# Patient Record
Sex: FEMALE | Race: ASIAN | NOT HISPANIC OR LATINO | Employment: UNEMPLOYED | URBAN - METROPOLITAN AREA
[De-identification: names, ages, dates, MRNs, and addresses within clinical notes are randomized per-mention and may not be internally consistent; named-entity substitution may affect disease eponyms.]

---

## 2017-01-10 ENCOUNTER — AMBULATORY - HEALTHEAST (OUTPATIENT)
Dept: EDUCATION SERVICES | Facility: CLINIC | Age: 52
End: 2017-01-10

## 2017-01-17 ENCOUNTER — COMMUNICATION - HEALTHEAST (OUTPATIENT)
Dept: FAMILY MEDICINE | Facility: CLINIC | Age: 52
End: 2017-01-17

## 2017-03-14 ENCOUNTER — AMBULATORY - HEALTHEAST (OUTPATIENT)
Dept: EDUCATION SERVICES | Facility: CLINIC | Age: 52
End: 2017-03-14

## 2017-03-14 DIAGNOSIS — E08.65 DIABETES MELLITUS DUE TO UNDERLYING CONDITION WITH HYPERGLYCEMIA (H): ICD-10-CM

## 2017-03-14 LAB — HBA1C MFR BLD: 5.9 % (ref 3.5–6)

## 2017-03-24 ENCOUNTER — OFFICE VISIT - HEALTHEAST (OUTPATIENT)
Dept: FAMILY MEDICINE | Facility: CLINIC | Age: 52
End: 2017-03-24

## 2017-03-24 ENCOUNTER — COMMUNICATION - HEALTHEAST (OUTPATIENT)
Dept: TELEHEALTH | Facility: CLINIC | Age: 52
End: 2017-03-24

## 2017-03-24 DIAGNOSIS — Z23 NEED FOR IMMUNIZATION AGAINST INFLUENZA: ICD-10-CM

## 2017-03-24 DIAGNOSIS — R12 HEARTBURN: ICD-10-CM

## 2017-03-24 DIAGNOSIS — E78.5 HYPERLIPIDEMIA: ICD-10-CM

## 2017-03-24 DIAGNOSIS — Z12.31 ENCOUNTER FOR SCREENING MAMMOGRAM FOR BREAST CANCER: ICD-10-CM

## 2017-03-24 DIAGNOSIS — E55.9 VITAMIN D DEFICIENCY: ICD-10-CM

## 2017-03-24 DIAGNOSIS — E11.9 DIET-CONTROLLED DIABETES MELLITUS (H): ICD-10-CM

## 2017-03-24 DIAGNOSIS — Z12.11 ENCOUNTER FOR SCREENING COLONOSCOPY: ICD-10-CM

## 2017-03-24 DIAGNOSIS — R05.9 COUGH: ICD-10-CM

## 2017-03-24 LAB
CHOLEST SERPL-MCNC: 224 MG/DL
FASTING STATUS PATIENT QL REPORTED: ABNORMAL
HDLC SERPL-MCNC: 55 MG/DL
LDLC SERPL CALC-MCNC: 145 MG/DL
TRIGL SERPL-MCNC: 121 MG/DL

## 2017-03-24 ASSESSMENT — MIFFLIN-ST. JEOR: SCORE: 1240.1

## 2017-03-30 ENCOUNTER — COMMUNICATION - HEALTHEAST (OUTPATIENT)
Dept: FAMILY MEDICINE | Facility: CLINIC | Age: 52
End: 2017-03-30

## 2017-04-04 ENCOUNTER — RECORDS - HEALTHEAST (OUTPATIENT)
Dept: ADMINISTRATIVE | Facility: OTHER | Age: 52
End: 2017-04-04

## 2017-04-04 ENCOUNTER — AMBULATORY - HEALTHEAST (OUTPATIENT)
Dept: LAB | Facility: CLINIC | Age: 52
End: 2017-04-04

## 2017-04-04 DIAGNOSIS — Z12.11 ENCOUNTER FOR SCREENING COLONOSCOPY: ICD-10-CM

## 2017-06-06 ENCOUNTER — RECORDS - HEALTHEAST (OUTPATIENT)
Dept: MAMMOGRAPHY | Facility: CLINIC | Age: 52
End: 2017-06-06

## 2017-06-06 ENCOUNTER — OFFICE VISIT - HEALTHEAST (OUTPATIENT)
Dept: FAMILY MEDICINE | Facility: CLINIC | Age: 52
End: 2017-06-06

## 2017-06-06 DIAGNOSIS — J30.2 SEASONAL ALLERGIES: ICD-10-CM

## 2017-06-06 DIAGNOSIS — H10.10 ALLERGIC CONJUNCTIVITIS: ICD-10-CM

## 2017-06-06 DIAGNOSIS — M65.30 TRIGGER FINGER: ICD-10-CM

## 2017-06-06 DIAGNOSIS — Z12.31 ENCOUNTER FOR SCREENING MAMMOGRAM FOR MALIGNANT NEOPLASM OF BREAST: ICD-10-CM

## 2017-06-06 DIAGNOSIS — J30.9 ALLERGIC RHINITIS: ICD-10-CM

## 2017-08-27 ENCOUNTER — HEALTH MAINTENANCE LETTER (OUTPATIENT)
Age: 52
End: 2017-08-27

## 2017-11-28 ENCOUNTER — RECORDS - HEALTHEAST (OUTPATIENT)
Dept: GENERAL RADIOLOGY | Facility: CLINIC | Age: 52
End: 2017-11-28

## 2017-11-28 ENCOUNTER — OFFICE VISIT - HEALTHEAST (OUTPATIENT)
Dept: FAMILY MEDICINE | Facility: CLINIC | Age: 52
End: 2017-11-28

## 2017-11-28 DIAGNOSIS — M25.561 PAIN IN RIGHT KNEE: ICD-10-CM

## 2017-11-28 DIAGNOSIS — Z23 NEED FOR INFLUENZA VACCINATION: ICD-10-CM

## 2017-11-28 DIAGNOSIS — M25.561 ACUTE BILATERAL KNEE PAIN: ICD-10-CM

## 2017-11-28 DIAGNOSIS — M25.562 PAIN IN LEFT KNEE: ICD-10-CM

## 2017-11-28 DIAGNOSIS — M25.562 ACUTE BILATERAL KNEE PAIN: ICD-10-CM

## 2017-11-29 ENCOUNTER — COMMUNICATION - HEALTHEAST (OUTPATIENT)
Dept: FAMILY MEDICINE | Facility: CLINIC | Age: 52
End: 2017-11-29

## 2018-04-11 ENCOUNTER — COMMUNICATION - HEALTHEAST (OUTPATIENT)
Dept: FAMILY MEDICINE | Facility: CLINIC | Age: 53
End: 2018-04-11

## 2018-04-11 DIAGNOSIS — E11.9 DIET-CONTROLLED DIABETES MELLITUS (H): ICD-10-CM

## 2018-04-11 DIAGNOSIS — E78.5 HYPERLIPIDEMIA: ICD-10-CM

## 2018-04-27 ENCOUNTER — OFFICE VISIT - HEALTHEAST (OUTPATIENT)
Dept: FAMILY MEDICINE | Facility: CLINIC | Age: 53
End: 2018-04-27

## 2018-04-27 DIAGNOSIS — E78.5 HYPERLIPIDEMIA: ICD-10-CM

## 2018-04-27 DIAGNOSIS — M17.9 KNEE OSTEOARTHRITIS: ICD-10-CM

## 2018-04-27 DIAGNOSIS — Z12.31 ENCOUNTER FOR SCREENING MAMMOGRAM FOR BREAST CANCER: ICD-10-CM

## 2018-04-27 DIAGNOSIS — E66.9 OBESITY: ICD-10-CM

## 2018-04-27 DIAGNOSIS — M25.562 ACUTE BILATERAL KNEE PAIN: ICD-10-CM

## 2018-04-27 DIAGNOSIS — E11.9 DIET-CONTROLLED DIABETES MELLITUS (H): ICD-10-CM

## 2018-04-27 DIAGNOSIS — M25.561 ACUTE BILATERAL KNEE PAIN: ICD-10-CM

## 2018-04-27 ASSESSMENT — MIFFLIN-ST. JEOR: SCORE: 1322.87

## 2018-10-19 ENCOUNTER — OFFICE VISIT - HEALTHEAST (OUTPATIENT)
Dept: FAMILY MEDICINE | Facility: CLINIC | Age: 53
End: 2018-10-19

## 2018-10-19 ENCOUNTER — HOSPITAL ENCOUNTER (OUTPATIENT)
Dept: LAB | Age: 53
Setting detail: SPECIMEN
Discharge: HOME | End: 2018-10-19

## 2018-10-19 DIAGNOSIS — R05.9 COUGHING: ICD-10-CM

## 2018-10-19 DIAGNOSIS — R07.0 THROAT PAIN: ICD-10-CM

## 2018-10-19 DIAGNOSIS — R06.02 SOB (SHORTNESS OF BREATH): ICD-10-CM

## 2018-10-19 DIAGNOSIS — J18.9 RLL PNEUMONIA: ICD-10-CM

## 2018-10-19 LAB — DEPRECATED S PYO AG THROAT QL EIA: NORMAL

## 2018-10-20 LAB — GROUP A STREP BY PCR: NORMAL

## 2018-12-19 ENCOUNTER — OFFICE VISIT - HEALTHEAST (OUTPATIENT)
Dept: FAMILY MEDICINE | Facility: CLINIC | Age: 53
End: 2018-12-19

## 2018-12-19 DIAGNOSIS — J20.9 ACUTE BRONCHITIS, UNSPECIFIED ORGANISM: ICD-10-CM

## 2018-12-19 DIAGNOSIS — E78.00 PURE HYPERCHOLESTEROLEMIA: ICD-10-CM

## 2018-12-19 DIAGNOSIS — Z12.31 ENCOUNTER FOR SCREENING MAMMOGRAM FOR BREAST CANCER: ICD-10-CM

## 2018-12-19 DIAGNOSIS — R06.2 WHEEZING: ICD-10-CM

## 2018-12-19 DIAGNOSIS — E11.9 DIET-CONTROLLED DIABETES MELLITUS (H): ICD-10-CM

## 2018-12-19 LAB
ALBUMIN SERPL-MCNC: 3.7 G/DL (ref 3.5–5)
ALP SERPL-CCNC: 87 U/L (ref 45–120)
ALT SERPL W P-5'-P-CCNC: 28 U/L (ref 0–45)
ANION GAP SERPL CALCULATED.3IONS-SCNC: 11 MMOL/L (ref 5–18)
AST SERPL W P-5'-P-CCNC: 25 U/L (ref 0–40)
BILIRUB SERPL-MCNC: 0.6 MG/DL (ref 0–1)
BUN SERPL-MCNC: 16 MG/DL (ref 8–22)
CALCIUM SERPL-MCNC: 9.1 MG/DL (ref 8.5–10.5)
CHLORIDE BLD-SCNC: 107 MMOL/L (ref 98–107)
CHOLEST SERPL-MCNC: 174 MG/DL
CO2 SERPL-SCNC: 25 MMOL/L (ref 22–31)
CREAT SERPL-MCNC: 0.72 MG/DL (ref 0.6–1.1)
CREAT UR-MCNC: 124.6 MG/DL
FASTING STATUS PATIENT QL REPORTED: YES
GFR SERPL CREATININE-BSD FRML MDRD: >60 ML/MIN/1.73M2
GLUCOSE BLD-MCNC: 104 MG/DL (ref 70–125)
HBA1C MFR BLD: 6 % (ref 3.5–6)
HDLC SERPL-MCNC: 45 MG/DL
LDLC SERPL CALC-MCNC: 111 MG/DL
MICROALBUMIN UR-MCNC: 0.79 MG/DL (ref 0–1.99)
MICROALBUMIN/CREAT UR: 6.3 MG/G
POTASSIUM BLD-SCNC: 4.3 MMOL/L (ref 3.5–5)
PROT SERPL-MCNC: 6.8 G/DL (ref 6–8)
SODIUM SERPL-SCNC: 143 MMOL/L (ref 136–145)
TRIGL SERPL-MCNC: 92 MG/DL

## 2018-12-19 ASSESSMENT — MIFFLIN-ST. JEOR: SCORE: 1290.56

## 2019-04-26 ENCOUNTER — AMBULATORY - HEALTHEAST (OUTPATIENT)
Dept: FAMILY MEDICINE | Facility: CLINIC | Age: 54
End: 2019-04-26

## 2019-04-26 ENCOUNTER — OFFICE VISIT - HEALTHEAST (OUTPATIENT)
Dept: FAMILY MEDICINE | Facility: CLINIC | Age: 54
End: 2019-04-26

## 2019-04-26 DIAGNOSIS — M17.9 OSTEOARTHRITIS OF KNEE, UNSPECIFIED LATERALITY, UNSPECIFIED OSTEOARTHRITIS TYPE: ICD-10-CM

## 2019-04-26 DIAGNOSIS — H10.33 ACUTE CONJUNCTIVITIS OF BOTH EYES, UNSPECIFIED ACUTE CONJUNCTIVITIS TYPE: ICD-10-CM

## 2019-04-26 DIAGNOSIS — M17.11 OSTEOARTHRITIS OF RIGHT KNEE, UNSPECIFIED OSTEOARTHRITIS TYPE: ICD-10-CM

## 2019-04-26 DIAGNOSIS — M25.562 BILATERAL ANTERIOR KNEE PAIN: ICD-10-CM

## 2019-04-26 DIAGNOSIS — E11.9 DIET-CONTROLLED DIABETES MELLITUS (H): ICD-10-CM

## 2019-04-26 DIAGNOSIS — M25.561 BILATERAL ANTERIOR KNEE PAIN: ICD-10-CM

## 2019-05-06 ENCOUNTER — OFFICE VISIT - HEALTHEAST (OUTPATIENT)
Dept: PHYSICAL THERAPY | Facility: REHABILITATION | Age: 54
End: 2019-05-06

## 2019-05-06 DIAGNOSIS — M62.81 MUSCLE WEAKNESS (GENERALIZED): ICD-10-CM

## 2019-05-06 DIAGNOSIS — M25.561 CHRONIC PAIN OF BOTH KNEES: ICD-10-CM

## 2019-05-06 DIAGNOSIS — M25.562 CHRONIC PAIN OF BOTH KNEES: ICD-10-CM

## 2019-05-06 DIAGNOSIS — G89.29 CHRONIC PAIN OF BOTH KNEES: ICD-10-CM

## 2019-05-10 ENCOUNTER — RECORDS - HEALTHEAST (OUTPATIENT)
Dept: ADMINISTRATIVE | Facility: OTHER | Age: 54
End: 2019-05-10

## 2019-05-20 ENCOUNTER — RECORDS - HEALTHEAST (OUTPATIENT)
Dept: ADMINISTRATIVE | Facility: OTHER | Age: 54
End: 2019-05-20

## 2019-05-21 ENCOUNTER — COMMUNICATION - HEALTHEAST (OUTPATIENT)
Dept: FAMILY MEDICINE | Facility: CLINIC | Age: 54
End: 2019-05-21

## 2020-03-05 ENCOUNTER — OFFICE VISIT - HEALTHEAST (OUTPATIENT)
Dept: FAMILY MEDICINE | Facility: CLINIC | Age: 55
End: 2020-03-05

## 2020-03-05 DIAGNOSIS — Z11.4 ENCOUNTER FOR SCREENING FOR HUMAN IMMUNODEFICIENCY VIRUS (HIV): ICD-10-CM

## 2020-03-05 DIAGNOSIS — E66.01 MORBID OBESITY (H): ICD-10-CM

## 2020-03-05 DIAGNOSIS — E78.00 PURE HYPERCHOLESTEROLEMIA: ICD-10-CM

## 2020-03-05 DIAGNOSIS — E11.9 DIET-CONTROLLED DIABETES MELLITUS (H): ICD-10-CM

## 2020-03-05 LAB
ALBUMIN SERPL-MCNC: 4.2 G/DL (ref 3.5–5)
ALP SERPL-CCNC: 114 U/L (ref 45–120)
ALT SERPL W P-5'-P-CCNC: 36 U/L (ref 0–45)
ANION GAP SERPL CALCULATED.3IONS-SCNC: 13 MMOL/L (ref 5–18)
AST SERPL W P-5'-P-CCNC: 25 U/L (ref 0–40)
BILIRUB SERPL-MCNC: 0.6 MG/DL (ref 0–1)
BUN SERPL-MCNC: 19 MG/DL (ref 8–22)
CALCIUM SERPL-MCNC: 9.4 MG/DL (ref 8.5–10.5)
CHLORIDE BLD-SCNC: 110 MMOL/L (ref 98–107)
CO2 SERPL-SCNC: 23 MMOL/L (ref 22–31)
CREAT SERPL-MCNC: 0.8 MG/DL (ref 0.6–1.1)
CREAT UR-MCNC: 114.8 MG/DL
GFR SERPL CREATININE-BSD FRML MDRD: >60 ML/MIN/1.73M2
GLUCOSE BLD-MCNC: 91 MG/DL (ref 70–125)
HBA1C MFR BLD: 7.1 % (ref 3.5–6)
HIV 1+2 AB+HIV1 P24 AG SERPL QL IA: NEGATIVE
LDLC SERPL CALC-MCNC: 125 MG/DL
MICROALBUMIN UR-MCNC: 1.69 MG/DL (ref 0–1.99)
MICROALBUMIN/CREAT UR: 14.7 MG/G
POTASSIUM BLD-SCNC: 4.2 MMOL/L (ref 3.5–5)
PROT SERPL-MCNC: 7.3 G/DL (ref 6–8)
SODIUM SERPL-SCNC: 146 MMOL/L (ref 136–145)

## 2020-03-05 ASSESSMENT — MIFFLIN-ST. JEOR: SCORE: 1335.45

## 2020-03-06 LAB — HCV AB SERPL QL IA: NEGATIVE

## 2020-03-10 ENCOUNTER — AMBULATORY - HEALTHEAST (OUTPATIENT)
Dept: FAMILY MEDICINE | Facility: CLINIC | Age: 55
End: 2020-03-10

## 2020-03-10 ENCOUNTER — COMMUNICATION - HEALTHEAST (OUTPATIENT)
Dept: FAMILY MEDICINE | Facility: CLINIC | Age: 55
End: 2020-03-10

## 2020-04-30 ENCOUNTER — COMMUNICATION - HEALTHEAST (OUTPATIENT)
Dept: FAMILY MEDICINE | Facility: CLINIC | Age: 55
End: 2020-04-30

## 2020-04-30 DIAGNOSIS — M17.9 KNEE OSTEOARTHRITIS: ICD-10-CM

## 2020-04-30 DIAGNOSIS — R05.9 COUGH: ICD-10-CM

## 2020-04-30 DIAGNOSIS — E08.65 DIABETES MELLITUS DUE TO UNDERLYING CONDITION WITH HYPERGLYCEMIA (H): ICD-10-CM

## 2020-04-30 DIAGNOSIS — M25.561 ACUTE BILATERAL KNEE PAIN: ICD-10-CM

## 2020-04-30 DIAGNOSIS — E11.9 DIET-CONTROLLED DIABETES MELLITUS (H): ICD-10-CM

## 2020-04-30 DIAGNOSIS — M25.562 ACUTE BILATERAL KNEE PAIN: ICD-10-CM

## 2020-05-13 ENCOUNTER — VIRTUAL VISIT (OUTPATIENT)
Dept: FAMILY MEDICINE | Facility: OTHER | Age: 55
End: 2020-05-13

## 2020-05-13 NOTE — PROGRESS NOTES
"Date: 2020 15:28:43  Clinician: Clint Dubon  Clinician NPI: [id number]  Patient: Ruiz Delacruz  Patient : 1965  Patient Address: [address] [place] [address]  Patient Phone: [phone]  Visit Protocol: URI  Patient Summary:  Ruiz is a 54 year old ( : 1965 ) female who initiated a Visit for COVID-19 (Coronavirus) evaluation and screening. When asked the question \"Please sign me up to receive news, health information and promotions from [employer].\", Ruiz responded \"No\".    Ruiz states her symptoms started gradually 5-6 days ago. After her symptoms started, they improved and then got worse again.   Her symptoms consist of a cough, ageusia, malaise, anosmia, a sore throat, and wheezing.   Symptom details     Cough: Ruiz coughs every 5-10 minutes and her cough is more bothersome at night. Phlegm comes into her throat when she coughs. She does not believe her cough is caused by post-nasal drip. The color of the phlegm is yellow.     Sore throat: Ruiz reports having moderate throat pain (4-6 on a 10 point pain scale), does not have exudate on her tonsils, and can swallow liquids. The lymph nodes in her neck are not enlarged. A rash has not appeared on the skin since the sore throat started.     Wheezing: Ruiz has been diagnosed with asthma. The wheezing interferes with her normal daily activities.     Ruiz denies having nasal congestion, vomiting, nausea, teeth pain, diarrhea, facial pain or pressure, chills, ear pain, headache, myalgias, rhinitis, enlarged lymph nodes, and fever. She also denies having recent facial or sinus surgery in the past 60 days. She is not experiencing dyspnea.   Precipitating events  Within the past week, Ruiz has not been exposed to someone with strep throat. She has recently been exposed to someone with influenza. Ruiz has been in close contact with the following high risk individuals: children under the age of 5.   Pertinent COVID-19 (Coronavirus) information  In the past 14 " days, Ruiz has not worked in a congregate living setting.   She does not work or volunteer as healthcare worker or a  and does not work or volunteer in a healthcare facility.   Ruiz also has not lived in a congregate living setting in the past 14 days. She does not live with a healthcare worker.   Ruiz has had a close contact with a laboratory-confirmed COVID-19 patient within 14 days of symptom onset. Additional information about contact with COVID-19 (Coronavirus) patient as reported by the patient (free text): I was around my grand kids prior to getting sick and around my daughter in law who was tested positive with Covid-19 on 5/9. Before this we didn't know she had the virus.   Triage Point(s) temporarily suspended for COVID-19 (Coronavirus) screening  Ruiz reported the following symptoms which were previously protocol referral points. These protocol referral points have temporarily been removed for purposes of COVID-19 (Coronavirus) screening.   Wheezing that keeps Ruiz from doing daily activities   Pertinent medical history  Ruiz has taken an antibiotic medication in the past month. Antibiotic details as reported by the patient (free text): Tylenol, cough syrup   Ruiz does not get yeast infections when she takes antibiotics.   Ruiz does not need a return to work/school note.   Weight: 190 lbs   Ruiz does not smoke or use smokeless tobacco.   Weight: 190 lbs    MEDICATIONS: Cough-Sore Throat Night oral, Tylenol Extra Strength oral, ALLERGIES: NKDA  Clinician Response:  Dear Uriz,   Dear Ruiz  Your symptoms show that you may have coronavirus (COVID-19). This illness can cause fever, cough and trouble breathing. Many people get a mild case and get better on their own. Some people can get very sick.  What should I do?  We would like to test you for this virus. This will be a curbside test done outside the clinic.  Please call [phone] to schedule your visit. Explain that you were referred by [facility] to  "have a COVID-19 test. Be ready to share your [facility] visit ID number.  Starting now:  Stay at least 6 feet away from others. (If someone will drive you to your test, stay in the backseat, as far away from the  as you can.)   Don't go to work, school or anywhere else. When it's time for your test, go straight to the testing site. Don't make any stops on the way there or back.   Wash your hands and face often. Use soap and water.   Cover your mouth and nose with a mask, tissue or washcloth.   Don't touch anyone. No hugging, kissing or handshakes.  While at home   Stay home and away from others (self-isolate) until:  You've had no fever---and no medicine that reduces fever---for 3 full days (72 hours). And...  Your other symptoms have gotten better. For example, your cough or breathing has improved. And...  At least 10 days have passed since your symptoms started.  During this time:  Stay in your own room (and use your own bathroom), if you can.  Don't go to work, school or anywhere else.  Stay away from others in your home. No hugging, kissing or shaking hands.  Don't let anyone visit.  Cover your mouth and nose with a mask, tissue or washcloth to avoid spreading germs.  Clean \"high touch\" surfaces often (doorknobs, counters, handles, etc.). Use a household cleaning spray or wipes.  Wash your hands and face often. Use soap and water.  How can I take care of myself?  1. Get lots of rest. Drink extra fluids (unless your doctor has told you not to).  2. Take Tylenol (acetaminophen) for fever or pain. If you have liver or kidney problems, ask your family doctor if it's okay to take Tylenol.  Adults can take either:   650 mg (two 325 mg pills) every 4 to 6 hours, or...  1,000 mg (two 500 mg pills) every 8 hours as needed.   Note: Don't take more than 3,000 mg in one day.   Acetaminophen is found in many medicines (both prescribed and over-the-counter medicines). Read all labels to be sure you don't take too much.   " For children, check the Tylenol bottle for the right dose. The dose is based on the child's age or weight.  3. If you have other health problems (like cancer, heart failure, an organ transplant or severe kidney disease): Call your specialty clinic if you don't feel better in the next 2 days.  4. Know when to call 911: If your breathing is so bad that it keeps you from doing normal activities, call 911 or go to the emergency room. Tell them that you've been staying home and may have COVID-19.  5. Sign up for [employer]. We know it's scary to hear that you might have COVID-19. We want to track your symptoms to make sure you're okay over the next 2 weeks. Please look for an email from [employer]---this is a free, online program that we'll use to keep in touch. To sign up, follow the link in the email. Learn more at [email or web address].  6. The following will serve as your written order for this Covid Test ordered by me for the indication of suspected Covid [Z20.828]: The test will be ordered in [facility], our electronic health record after you are scheduled and will show as ordered and authorized by Triston Regalado MD   Order: Covid-19 (Coronavirus) PCR for SYMPTOMATIC testing from [facility]  Where can I get more information?  To learn more about COVID-19 and how to care for yourself at home, please visit the CDC website at https://www.cdc.gov/coronavirus/2019-ncov/about/steps-when-sick.html.  For more about your care at [facility], please visit [email or web address]  If you'd like to be part of a COVID-19 clinical trial (research study) at the [facility], go to [email or web address] for details.    COVID-19 (Coronavirus) General Information  Because there is currently no vaccine to prevent infection, the best way to protect yourself is to avoid being exposed to this virus. Common symptoms of COVID-19 include but are not limited to fever, cough,  and shortness of breath. These symptoms appear 2-14 days after you are exposed to the virus that causes COVID-19. Click here for more information from the CDC on how to protect yourself.  If you are sick with COVID-19 or suspect you are infected with the virus that causes COVID-19, follow the steps here from the CDC to help prevent the disease from spreading to people in your home and community.  Click here for general information from the CDC on testing.  If you develop any of these emergency warning signs for COVID-19, get medical attention immediately:     Trouble breathing    Persistent pain or pressure in the chest    New confusion or inability to arouse    Bluish lips or face      Call your doctor or clinic before going in. Call 911 if you have a medical emergency and notify the  you have or think you may have COVID-19.  For more detailed and up to date information on COVID-19 (Coronavirus), please visit the CDC website.   Diagnosis: Cough  Diagnosis ICD: R05

## 2020-05-14 ENCOUNTER — AMBULATORY - HEALTHEAST (OUTPATIENT)
Dept: FAMILY MEDICINE | Facility: CLINIC | Age: 55
End: 2020-05-14

## 2020-05-14 ENCOUNTER — OFFICE VISIT - HEALTHEAST (OUTPATIENT)
Dept: FAMILY MEDICINE | Facility: CLINIC | Age: 55
End: 2020-05-14

## 2020-05-14 DIAGNOSIS — Z20.822 SUSPECTED COVID-19 VIRUS INFECTION: ICD-10-CM

## 2020-05-16 ENCOUNTER — COMMUNICATION - HEALTHEAST (OUTPATIENT)
Dept: FAMILY MEDICINE | Facility: CLINIC | Age: 55
End: 2020-05-16

## 2020-05-27 ENCOUNTER — OFFICE VISIT - HEALTHEAST (OUTPATIENT)
Dept: FAMILY MEDICINE | Facility: CLINIC | Age: 55
End: 2020-05-27

## 2020-05-27 DIAGNOSIS — R06.2 WHEEZING: ICD-10-CM

## 2020-05-27 DIAGNOSIS — E08.65 DIABETES MELLITUS DUE TO UNDERLYING CONDITION WITH HYPERGLYCEMIA (H): ICD-10-CM

## 2020-05-27 DIAGNOSIS — E78.00 PURE HYPERCHOLESTEROLEMIA: ICD-10-CM

## 2020-05-27 DIAGNOSIS — E11.9 DIET-CONTROLLED DIABETES MELLITUS (H): ICD-10-CM

## 2020-05-27 DIAGNOSIS — U07.1 2019 NOVEL CORONAVIRUS DISEASE (COVID-19): ICD-10-CM

## 2020-07-09 ENCOUNTER — RECORDS - HEALTHEAST (OUTPATIENT)
Dept: ADMINISTRATIVE | Facility: OTHER | Age: 55
End: 2020-07-09

## 2020-07-09 LAB — RETINOPATHY: NEGATIVE

## 2020-07-13 ENCOUNTER — RECORDS - HEALTHEAST (OUTPATIENT)
Dept: HEALTH INFORMATION MANAGEMENT | Facility: CLINIC | Age: 55
End: 2020-07-13

## 2020-09-22 ENCOUNTER — OFFICE VISIT - HEALTHEAST (OUTPATIENT)
Dept: FAMILY MEDICINE | Facility: CLINIC | Age: 55
End: 2020-09-22

## 2020-09-22 ENCOUNTER — COMMUNICATION - HEALTHEAST (OUTPATIENT)
Dept: FAMILY MEDICINE | Facility: CLINIC | Age: 55
End: 2020-09-22

## 2020-09-22 DIAGNOSIS — E08.65 DIABETES MELLITUS DUE TO UNDERLYING CONDITION WITH HYPERGLYCEMIA (H): ICD-10-CM

## 2020-09-22 DIAGNOSIS — E66.01 MORBID OBESITY (H): ICD-10-CM

## 2020-09-22 DIAGNOSIS — E11.9 DIET-CONTROLLED DIABETES MELLITUS (H): ICD-10-CM

## 2020-09-22 DIAGNOSIS — R06.2 WHEEZING: ICD-10-CM

## 2020-09-22 DIAGNOSIS — Z00.00 HEALTH CARE MAINTENANCE: ICD-10-CM

## 2020-09-22 DIAGNOSIS — E78.00 PURE HYPERCHOLESTEROLEMIA: ICD-10-CM

## 2020-09-23 ENCOUNTER — RECORDS - HEALTHEAST (OUTPATIENT)
Dept: ADMINISTRATIVE | Facility: OTHER | Age: 55
End: 2020-09-23

## 2020-09-28 ENCOUNTER — AMBULATORY - HEALTHEAST (OUTPATIENT)
Dept: NURSING | Facility: CLINIC | Age: 55
End: 2020-09-28

## 2020-09-28 ENCOUNTER — AMBULATORY - HEALTHEAST (OUTPATIENT)
Dept: LAB | Facility: CLINIC | Age: 55
End: 2020-09-28

## 2020-09-28 DIAGNOSIS — Z23 NEED FOR INFLUENZA VACCINATION: ICD-10-CM

## 2020-09-28 DIAGNOSIS — E08.65 DIABETES MELLITUS DUE TO UNDERLYING CONDITION WITH HYPERGLYCEMIA (H): ICD-10-CM

## 2020-09-28 LAB — HBA1C MFR BLD: 6.9 %

## 2020-10-01 DIAGNOSIS — Z11.59 SCREENING FOR VIRAL DISEASE: ICD-10-CM

## 2020-10-08 ENCOUNTER — COMMUNICATION - HEALTHEAST (OUTPATIENT)
Dept: FAMILY MEDICINE | Facility: CLINIC | Age: 55
End: 2020-10-08

## 2020-10-08 ENCOUNTER — RECORDS - HEALTHEAST (OUTPATIENT)
Dept: ADMINISTRATIVE | Facility: OTHER | Age: 55
End: 2020-10-08

## 2020-10-08 DIAGNOSIS — E11.9 DIET-CONTROLLED DIABETES MELLITUS (H): ICD-10-CM

## 2020-10-08 DIAGNOSIS — E08.65 DIABETES MELLITUS DUE TO UNDERLYING CONDITION WITH HYPERGLYCEMIA (H): ICD-10-CM

## 2020-11-12 ENCOUNTER — COMMUNICATION - HEALTHEAST (OUTPATIENT)
Dept: FAMILY MEDICINE | Facility: CLINIC | Age: 55
End: 2020-11-12

## 2020-11-12 DIAGNOSIS — E11.9 DIET-CONTROLLED DIABETES MELLITUS (H): ICD-10-CM

## 2020-11-12 DIAGNOSIS — E08.65 DIABETES MELLITUS DUE TO UNDERLYING CONDITION WITH HYPERGLYCEMIA (H): ICD-10-CM

## 2020-11-18 ENCOUNTER — RECORDS - HEALTHEAST (OUTPATIENT)
Dept: ADMINISTRATIVE | Facility: OTHER | Age: 55
End: 2020-11-18

## 2020-11-19 ENCOUNTER — RECORDS - HEALTHEAST (OUTPATIENT)
Dept: ADMINISTRATIVE | Facility: OTHER | Age: 55
End: 2020-11-19

## 2020-12-01 ENCOUNTER — COMMUNICATION - HEALTHEAST (OUTPATIENT)
Dept: FAMILY MEDICINE | Facility: CLINIC | Age: 55
End: 2020-12-01

## 2020-12-01 DIAGNOSIS — Z00.00 HEALTH CARE MAINTENANCE: ICD-10-CM

## 2020-12-09 ENCOUNTER — RECORDS - HEALTHEAST (OUTPATIENT)
Dept: ADMINISTRATIVE | Facility: OTHER | Age: 55
End: 2020-12-09

## 2020-12-18 ENCOUNTER — COMMUNICATION - HEALTHEAST (OUTPATIENT)
Dept: FAMILY MEDICINE | Facility: CLINIC | Age: 55
End: 2020-12-18

## 2020-12-18 DIAGNOSIS — E78.00 PURE HYPERCHOLESTEROLEMIA: ICD-10-CM

## 2020-12-29 ENCOUNTER — COMMUNICATION - HEALTHEAST (OUTPATIENT)
Dept: FAMILY MEDICINE | Facility: CLINIC | Age: 55
End: 2020-12-29

## 2020-12-29 DIAGNOSIS — E78.00 PURE HYPERCHOLESTEROLEMIA: ICD-10-CM

## 2021-01-05 ENCOUNTER — RECORDS - HEALTHEAST (OUTPATIENT)
Dept: ADMINISTRATIVE | Facility: OTHER | Age: 56
End: 2021-01-05

## 2021-01-13 ENCOUNTER — OFFICE VISIT - HEALTHEAST (OUTPATIENT)
Dept: FAMILY MEDICINE | Facility: CLINIC | Age: 56
End: 2021-01-13

## 2021-01-13 ENCOUNTER — RECORDS - HEALTHEAST (OUTPATIENT)
Dept: MAMMOGRAPHY | Facility: CLINIC | Age: 56
End: 2021-01-13

## 2021-01-13 DIAGNOSIS — Z12.31 ENCOUNTER FOR SCREENING MAMMOGRAM FOR MALIGNANT NEOPLASM OF BREAST: ICD-10-CM

## 2021-01-13 DIAGNOSIS — E08.65 DIABETES MELLITUS DUE TO UNDERLYING CONDITION WITH HYPERGLYCEMIA, WITHOUT LONG-TERM CURRENT USE OF INSULIN (H): ICD-10-CM

## 2021-01-13 DIAGNOSIS — Z12.31 VISIT FOR SCREENING MAMMOGRAM: ICD-10-CM

## 2021-01-13 DIAGNOSIS — Z12.11 SCREEN FOR COLON CANCER: ICD-10-CM

## 2021-01-13 DIAGNOSIS — E66.01 MORBID OBESITY (H): ICD-10-CM

## 2021-01-13 DIAGNOSIS — E78.00 PURE HYPERCHOLESTEROLEMIA: ICD-10-CM

## 2021-01-13 LAB
ALBUMIN SERPL-MCNC: 4.1 G/DL (ref 3.5–5)
ALP SERPL-CCNC: 107 U/L (ref 45–120)
ALT SERPL W P-5'-P-CCNC: 29 U/L (ref 0–45)
ANION GAP SERPL CALCULATED.3IONS-SCNC: 11 MMOL/L (ref 5–18)
AST SERPL W P-5'-P-CCNC: 22 U/L (ref 0–40)
BILIRUB SERPL-MCNC: 1 MG/DL (ref 0–1)
BUN SERPL-MCNC: 20 MG/DL (ref 8–22)
CALCIUM SERPL-MCNC: 9.4 MG/DL (ref 8.5–10.5)
CHLORIDE BLD-SCNC: 106 MMOL/L (ref 98–107)
CHOLEST SERPL-MCNC: 224 MG/DL
CO2 SERPL-SCNC: 25 MMOL/L (ref 22–31)
CREAT SERPL-MCNC: 0.87 MG/DL (ref 0.6–1.1)
FASTING STATUS PATIENT QL REPORTED: NO
GFR SERPL CREATININE-BSD FRML MDRD: >60 ML/MIN/1.73M2
GLUCOSE BLD-MCNC: 122 MG/DL (ref 70–125)
HBA1C MFR BLD: 7.8 %
HDLC SERPL-MCNC: 55 MG/DL
LDLC SERPL CALC-MCNC: 149 MG/DL
POTASSIUM BLD-SCNC: 4.3 MMOL/L (ref 3.5–5)
PROT SERPL-MCNC: 7.3 G/DL (ref 6–8)
SODIUM SERPL-SCNC: 142 MMOL/L (ref 136–145)
TRIGL SERPL-MCNC: 101 MG/DL

## 2021-01-13 ASSESSMENT — MIFFLIN-ST. JEOR: SCORE: 1321.84

## 2021-01-22 LAB
HEMOCCULT SP1 STL QL: NEGATIVE
HEMOCCULT SP2 STL QL: NEGATIVE
HEMOCCULT SP3 STL QL: NEGATIVE

## 2021-01-25 ENCOUNTER — COMMUNICATION - HEALTHEAST (OUTPATIENT)
Dept: FAMILY MEDICINE | Facility: CLINIC | Age: 56
End: 2021-01-25

## 2021-01-25 DIAGNOSIS — E78.00 PURE HYPERCHOLESTEROLEMIA: ICD-10-CM

## 2021-01-25 DIAGNOSIS — E08.65 DIABETES MELLITUS DUE TO UNDERLYING CONDITION WITH HYPERGLYCEMIA (H): ICD-10-CM

## 2021-01-29 ENCOUNTER — RECORDS - HEALTHEAST (OUTPATIENT)
Dept: ADMINISTRATIVE | Facility: OTHER | Age: 56
End: 2021-01-29

## 2021-01-29 ENCOUNTER — OFFICE VISIT - HEALTHEAST (OUTPATIENT)
Dept: FAMILY MEDICINE | Facility: CLINIC | Age: 56
End: 2021-01-29

## 2021-01-29 DIAGNOSIS — E08.65 DIABETES MELLITUS DUE TO UNDERLYING CONDITION WITH HYPERGLYCEMIA, WITHOUT LONG-TERM CURRENT USE OF INSULIN (H): ICD-10-CM

## 2021-01-29 DIAGNOSIS — F39 MOOD DISORDER (H): ICD-10-CM

## 2021-01-29 DIAGNOSIS — E66.01 MORBID OBESITY (H): ICD-10-CM

## 2021-01-29 DIAGNOSIS — G89.4 CHRONIC PAIN SYNDROME: ICD-10-CM

## 2021-01-29 DIAGNOSIS — Z00.00 ROUTINE GENERAL MEDICAL EXAMINATION AT A HEALTH CARE FACILITY: ICD-10-CM

## 2021-01-29 DIAGNOSIS — E78.00 PURE HYPERCHOLESTEROLEMIA: ICD-10-CM

## 2021-01-29 DIAGNOSIS — J45.20 MILD INTERMITTENT ASTHMA WITHOUT COMPLICATION: ICD-10-CM

## 2021-01-29 ASSESSMENT — MIFFLIN-ST. JEOR: SCORE: 1337.62

## 2021-02-16 ENCOUNTER — RECORDS - HEALTHEAST (OUTPATIENT)
Dept: ADMINISTRATIVE | Facility: OTHER | Age: 56
End: 2021-02-16

## 2021-02-18 ENCOUNTER — COMMUNICATION - HEALTHEAST (OUTPATIENT)
Dept: FAMILY MEDICINE | Facility: CLINIC | Age: 56
End: 2021-02-18

## 2021-02-18 DIAGNOSIS — E08.65 DIABETES MELLITUS DUE TO UNDERLYING CONDITION WITH HYPERGLYCEMIA (H): ICD-10-CM

## 2021-02-26 ENCOUNTER — COMMUNICATION - HEALTHEAST (OUTPATIENT)
Dept: FAMILY MEDICINE | Facility: CLINIC | Age: 56
End: 2021-02-26

## 2021-02-26 ENCOUNTER — OFFICE VISIT - HEALTHEAST (OUTPATIENT)
Dept: FAMILY MEDICINE | Facility: CLINIC | Age: 56
End: 2021-02-26

## 2021-02-26 DIAGNOSIS — G89.4 CHRONIC PAIN SYNDROME: ICD-10-CM

## 2021-02-26 DIAGNOSIS — E11.65 TYPE 2 DIABETES MELLITUS WITH HYPERGLYCEMIA, WITHOUT LONG-TERM CURRENT USE OF INSULIN (H): ICD-10-CM

## 2021-02-26 DIAGNOSIS — E78.00 PURE HYPERCHOLESTEROLEMIA: ICD-10-CM

## 2021-02-26 DIAGNOSIS — F39 MOOD DISORDER (H): ICD-10-CM

## 2021-02-26 DIAGNOSIS — E66.01 MORBID OBESITY (H): ICD-10-CM

## 2021-02-26 ASSESSMENT — PATIENT HEALTH QUESTIONNAIRE - PHQ9: SUM OF ALL RESPONSES TO PHQ QUESTIONS 1-9: 9

## 2021-03-03 ENCOUNTER — RECORDS - HEALTHEAST (OUTPATIENT)
Dept: ADMINISTRATIVE | Facility: OTHER | Age: 56
End: 2021-03-03

## 2021-03-03 ENCOUNTER — COMMUNICATION - HEALTHEAST (OUTPATIENT)
Dept: FAMILY MEDICINE | Facility: CLINIC | Age: 56
End: 2021-03-03

## 2021-03-10 ENCOUNTER — RECORDS - HEALTHEAST (OUTPATIENT)
Dept: ADMINISTRATIVE | Facility: OTHER | Age: 56
End: 2021-03-10

## 2021-05-27 VITALS — SYSTOLIC BLOOD PRESSURE: 124 MMHG | DIASTOLIC BLOOD PRESSURE: 84 MMHG | HEART RATE: 75 BPM

## 2021-05-27 ASSESSMENT — PATIENT HEALTH QUESTIONNAIRE - PHQ9: SUM OF ALL RESPONSES TO PHQ QUESTIONS 1-9: 9

## 2021-05-28 ENCOUNTER — RECORDS - HEALTHEAST (OUTPATIENT)
Dept: ADMINISTRATIVE | Facility: CLINIC | Age: 56
End: 2021-05-28

## 2021-05-28 ASSESSMENT — ASTHMA QUESTIONNAIRES: ACT_TOTALSCORE: 25

## 2021-05-28 NOTE — PROGRESS NOTES
Assessment/ Plan  1. Osteoarthritis of right knee, unspecified osteoarthritis type  Some bilateral pain, significant patellofemoral  Component.  Referral to physical therapy  Recommend knee extension exercises, acetaminophen, follow-up if not improving.  2. Diet-controlled diabetes mellitus (H)  Requesting new close moderate to her, up-to-date on A1c  - blood glucose test (GLUCOSE BLOOD) strips; Testdaily  as directed; Brand to match glucometer and  insurance  Dispense: 50 strip; Refill: 11  - blood glucose meter (GLUCOMETER); Dispense glucometer brand per patient's insurance at pharmacy discretion.  Dispense: 1 each; Refill: 0    Body mass index is 38.83 kg/m .    Subjective  CC:  Chief Complaint   Patient presents with     Handicap sticker     Diabetes     HPI:  53-year-old 2 months knee pain, anterior, stiffness, feels like the kneecap goes out of joint sometimes.  No swelling.  No injury.  Requesting handicap parking sticker.  Walks with a cane.    Also requesting refill on glucometer and test strips.  Hers is not currently working.  Patient Active Problem List   Diagnosis     Obesity     Low back pain     Leg weakness     Adjustment disorder     Incontinence     Diet-controlled diabetes mellitus (H)     Hyperlipidemia     Current medications reviewed as follows:  Current Outpatient Medications on File Prior to Visit   Medication Sig     acetaminophen (TYLENOL EXTRA STRENGTH) 500 MG tablet Take 2 tablets (1,000 mg total) by mouth every 6 (six) hours as needed for pain.     albuterol (ACCUNEB) 1.25 mg/3 mL nebulizer solution Take 3 mL (1.25 mg total) by nebulization every 6 (six) hours as needed for wheezing.     albuterol (PROAIR HFA;PROVENTIL HFA;VENTOLIN HFA) 90 mcg/actuation inhaler Inhale 2 puffs every 4 (four) hours as needed for wheezing or shortness of breath.     albuterol (PROVENTIL HFA;VENTOLIN HFA) 90 mcg/actuation inhaler Inhale 2 puffs every 6 (six) hours as needed for wheezing.     atorvastatin  (LIPITOR) 10 MG tablet Take 1 tablet (10 mg total) by mouth at bedtime.     azithromycin (ZITHROMAX) 250 MG tablet Take 500 mg (2 x 250 mg tablets) on day 1 followed by 250 mg (1 tablet) on days 2-5.     blood glucose meter (ONETOUCH ULTRA2) Dispense glucometer brand per patient's insurance at pharmacy discretion.     blood glucose test strips Use 1 each As Directed as needed. Dispense brand per patient's insurance at pharmacy discretion.     calcium carbonate-vitamin D3 (CALCIUM 500 WITH D) 500 mg(1,250mg) -400 unit Tab Take 1 tablet by mouth daily.     codeine-guaiFENesin (GUAIFENESIN AC)  mg/5 mL liquid Take 10 mL by mouth 3 (three) times a day as needed for cough.     ergocalciferol (ERGOCALCIFEROL) 50,000 unit capsule Take 1 capsule (50,000 Units total) by mouth once a week.     ibuprofen (ADVIL,MOTRIN) 600 MG tablet Take 1 tablet (600 mg total) by mouth 2 (two) times a day as needed for pain.     lancets (ONETOUCH DELICA LANCETS) 30 gauge Misc Use to check blood sugar once daily     loratadine (CLARITIN) 10 mg tablet Take 1 tablet (10 mg total) by mouth daily.     predniSONE (DELTASONE) 20 MG tablet Take 3 tablets at once daily for five days.     ranitidine (ZANTAC) 150 MG tablet Take 1 tablet (150 mg total) by mouth 2 (two) times a day.     No current facility-administered medications on file prior to visit.      Social History     Tobacco Use   Smoking Status Never Smoker   Smokeless Tobacco Never Used     Social History     Social History Narrative     Not on file     Patient Care Team:  Ray Mcgarry MD as PCP - General (Family Medicine)  ROS  Full 10 system review including constitutional, respiratory, cardiac, gi, urinary, rheumatologic, neurologic, reproductive, dermatologic psychiatric is  performed  and is otherwise negative         Objective  Physical Exam  Vitals:    04/26/19 1604   BP: 124/89   Patient Site: Right Arm   Patient Position: Sitting   Cuff Size: Adult Regular   Pulse: 60   Resp:  14   Weight: 189 lb (85.7 kg)     Right knee is normal on inspection.  There is no sign of effusion and overlying skin is not erythematous.  Palpation of the knee shows  Moderate tenderness of the patella and positive patellar compression test.  Mild tenderness of the medial joint line.  Negative tenderness of the lateral joint line.  There is no tenderness with stress of the MCL or LCL.  Lockman's test is negative.  Idania's test is negative        Diagnostics  None    Please note: Voice recognition software was used in this dictation.  It may therefore contain typographical errors.

## 2021-05-28 NOTE — PROGRESS NOTES
[facility] Certification Request    May 6, 2019      Patient: Ruiz Delacruz  MR Number: [id number]  YOB: 1965  Date of Visit: 5/6/2019      Dear Dr. Alan, Abraham Nash, *:    Thank you for this referral.   We are seeing Ruiz Delacruz in Physical Therapy for knee pain.    Medicare and/or Medicaid requires physician review and approval of the treatment plan. Please review the plan of care and verify that you agree with the therapy plan of care by co-signing this note.      Plan of Care  Authorization / Certification Start Date: 05/06/19  Authorization / Certification End Date: 08/04/19  Authorization / Certification Number of Visits: 12  Communication with: Referral Source  Patient Related Instruction: Nature of Condition;Treatment plan and rationale;Self Care instruction;Basis of treatment;Body mechanics;Posture  Times per Week: 1  Number of Weeks: 12  Number of Visits: 12  Discharge Planning: Patient will be discharged when independent in self-management of condition or when goals are met.  Precautions / Restrictions : None  Therapeutic Exercise: ROM;Stretching;Strengthening  Neuromuscular Reeducation: kinesio tape;posture;balance/proprioception;core  Manual Therapy: soft tissue mobilization;myofascial release;joint mobilization;muscle energy;strain counterstrain  Modalities: electrical stimulation;ultrasound      Goals:  Pt. will be independent with home exercise program in : 6 weeks    Pt will: be able to walk 2 miles without pain in the knees; in 8 weeks  Pt will: be able to maneuver stairs without pain in the knees; in 8 weeks  Pt will: be able to lie down pain-free in the knees; in 8 weeks        If you have any questions or concerns, please don't hesitate to call.    Sincerely,      Ioana Angel, PT, DPT        Physician recommendation:     ___ Follow therapist's recommendation        ___ Modify therapy      *Physician co-signature indicates they certify the need for these  services furnished within this plan and while under their care.        [facility]   Knee Initial Evaluation    Patient Name: Ruiz Delacruz  Date of evaluation: 5/6/2019  Referral Diagnosis: Bilateral anterior knee pain  Referring provider: Abraham Alan, *  Visit Diagnosis:     ICD-10-CM    1. Chronic pain of both knees M25.561     M25.562     G89.29    2. Muscle weakness (generalized) M62.81        Assessment:        Ruiz Delacruz is a 53 y.o. female who presents to therapy today with chief complaints of bilateral knee pain L > R. Onset date of sx was 4/26/19 but pain began 1.5 years ago.  Pt reported h/o obesity, adjustment disorder, and hyperlipidemia.  Pain symptoms are poking and sore in nature.  Functional impairments include walking down stairs, walking up stairs, walking on uneven surfaces, and squatting.  Pt demo's signs and sx consistent with bilateral knee joint pain.   Pt. is appropriate for skilled PT intervention as outlined in the Plan of Care (POC).  Pt. is a good candidate for skilled PT services to improve pain levels and function.    Goals:  Pt. will be independent with home exercise program in : 6 weeks    Pt will: be able to walk 2 miles without pain in the knees; in 8 weeks  Pt will: be able to maneuver stairs without pain in the knees; in 8 weeks  Pt will: be able to lie down pain-free in the knees; in 8 weeks      Patient's expectations/goals are realistic.    Barriers to Learning or Achieving Goals:  Language barriers.       Plan / Patient Instructions:      Plan of Care:   Authorization / Certification Start Date: 05/06/19  Authorization / Certification End Date: 08/04/19  Authorization / Certification Number of Visits: 12  Communication with: Referral Source  Patient Related Instruction: Nature of Condition;Treatment plan and rationale;Self Care instruction;Basis of treatment;Body mechanics;Posture  Times per Week: 1  Number of Weeks: 12  Number of Visits: 12  Discharge  Planning: Patient will be discharged when independent in self-management of condition or when goals are met.  Precautions / Restrictions : None  Therapeutic Exercise: ROM;Stretching;Strengthening  Neuromuscular Reeducation: kinesio tape;posture;balance/proprioception;core  Manual Therapy: soft tissue mobilization;myofascial release;joint mobilization;muscle energy;strain counterstrain  Modalities: electrical stimulation;ultrasound      POC and pathology of condition were reviewed with patient.  Pt. is in agreement with the Plan of Care  A Home Exercise Program (HEP) was initiated today.  Pt. was instructed in exercises by PT and patient was given a handout with detailed instructions.    Plan for next visit: Continue with patellar lifts if helpful.  Progress strengthening as able.     Subjective:      The patient reports that her knee pain started about a year and a half ago.  They said the ligament in her knee is worn out.     Social information:   Living Situation:single family home   Occupation:homemaker   Work Status:NA   Equipment Available: SE cane    Pain Ratin-7 (mild)  Pain rating at best: 2-3  Pain rating at worst: 7-8 (severe)  Pain description:poking, sore    Functional limitations are described as occurring with:   walking down stairs, walking up stairs, walking on uneven surfaces, squatting    Patient reports no benefit from  rest  , pain medication       Objective:      Note: Items left blank indicates the item was not performed or not indicated at the time of the evaluation.    Knee Examination  1. Chronic pain of both knees     2. Muscle weakness (generalized)       Involved Side: Bilateral, L > R  Posture Observation:      General standing posture is fair. Toe out posture with high arches.  Assistive Device: None  Gait Observation: Antalgic gait L  Lumbar Clearing: Does not provoke symptoms  Hip Clearing: Does not provoke symptoms    Knee ROM:   Date: 19      Knee ROM ( ) AROM in degrees  AROM in degrees AROM in degrees    Right Left Right Left Right Left   Knee Flexion (0-130 ) 137 130       Knee extension (0 ) 0 0        PROM in degrees PROM in degrees PROM in degrees    Right Left Right Left Right Left   Knee Flexion (0-130 )         Knee extension (0 )            Hip/Knee Strength     Date: 05/06/19     Hip/Knee Strength (/5) MMT MMT MMT    Right Left Right Left Right Left   Hip Flexion 4+ 4+       Hip Abduction 4 4       Hip Adduction 4+ 4+       Hip Extension         Hip External Rotation 4+ 4+       Hip Internal Rotation 4+ 4+       Knee Extension 5 5       Knee Flexion 4 4         Flexibility: Fair  Palpation: Mild tenderness medial L knee.  Decreased patellar mobility bilaterally.    Knee Special Tests:     Meniscal Tests Right (+/-) Left (+/-) Ligament Tests Right (+/-) Left (+/-)   Idania s - - Lachman - -   Joint Line Tenderness - - Anterior Drawer - -   Thessaly   Posterior Drawer - -   Apley s   Posterior sag - -   Knee OA Right (+/-) Left (+/-) Valgus Stress - -   1. > 49 y/o  2. Stiffness > 30 minutes  3. Crepitus   4. Bony tenderness  5. Bone enlargement  6. No warmth to the touch   Varus Stress - -   Other Right (+/-) Left (+/-) Other     Ely s   Other     Rakesh            Appt time: 11:00AM - 12:00PM    Treatment Today     TREATMENT MINUTES COMMENTS   Evaluation 35 -Low complexity knee evaluation   Self-care/ Home management     Manual therapy 10 -Supine patellar lifts with plunger bilaterally   Neuromuscular Re-education     Therapeutic Activity     Therapeutic Exercises 15 -Demo/performance of HEP  -Patient educated on pathology  -Discussed POC   Gait training     Modality__________________                Total 60    Blank areas are intentional and mean the treatment did not include these items.       PT Evaluation Code: (Please list factors)  Patient History/Comorbidities: obesity, adjustment disorder, and hyperlipidemia  Examination: Impaired knee ROM, impaired LE  strength  Clinical Presentation: Stable  Clinical Decision Making: Low complexity    Patient History/  Comorbidities Examination  (body structures and functions, activity limitations, and/or participation restrictions) Clinical Presentation Clinical Decision Making (Complexity)   No documented Comorbidities or personal factors 1-2 Elements Stable and/or uncomplicated Low   1-2 documented comorbidities or personal factor 3 Elements Evolving clinical presentation with changing characteristics Moderate   3-4 documented comorbidities or personal factors 4 or more Unstable and unpredictable High                Ioana Angel PT, DPT  5/6/2019  12:55 PM

## 2021-05-29 ENCOUNTER — RECORDS - HEALTHEAST (OUTPATIENT)
Dept: ADMINISTRATIVE | Facility: CLINIC | Age: 56
End: 2021-05-29

## 2021-05-29 NOTE — TELEPHONE ENCOUNTER
Who is calling:  Marilee-billing office at [facility]  Reason for Call:  Caller questioning the form scanned in on 5/10/19 #7 is not checked off. Is patient taking insulin?  Insulin was not found on current medication list so provider verification is requested. Please return call with verbal answer to caller.   Date of last appointment with primary care: 4/26/19  Okay to leave a detailed message: Yes

## 2021-05-29 NOTE — TELEPHONE ENCOUNTER
Returned phone call to Marilee and informed her that patient is not taking insulin per MD provider note.   Rand Felix

## 2021-05-30 VITALS — BODY MASS INDEX: 34.51 KG/M2 | WEIGHT: 168 LBS

## 2021-05-30 VITALS — BODY MASS INDEX: 33.26 KG/M2 | HEIGHT: 59 IN | WEIGHT: 165 LBS

## 2021-05-30 NOTE — PROGRESS NOTES
[facility] Discharge Summary  Patient Name: Ruiz Delacruz  Date: 7/1/2019  Referral Diagnosis: Bilateral anterior knee pain  Referring provider: Abraham Alan, *  Visit Diagnosis:     ICD-10-CM    1. Chronic pain of both knees M25.561     M25.562     G89.29    2. Muscle weakness (generalized) M62.81        Goals:  Pt. will be independent with home exercise program in : 6 weeks;Not Met    Pt will: be able to walk 2 miles without pain in the knees; in 8 weeks; Not Met  Pt will: be able to maneuver stairs without pain in the knees; in 8 weeks; Not Met  Pt will: be able to lie down pain-free in the knees; in 8 weeks; Not Met    Patient was seen for 1 visit on 5/6/19 with 2 missed appointments.  The patient attended therapy initially, but did not finish the therapy sessions prescribed.  Goals were not fully achieved. Explanation for goals not achieved: Did not return to PT after initial evaluation.  The patient discontinued therapy, did not return.  No further therapy is required at this time.  The patient NS all follow up appointments scheduled.  She is appropriate for discharge from skilled PT services at this time.    Home exercise program given to patient:  Exercise #1: SLR  Comment #1: HEP  Exercise #2: Bridge  Comment #2: HEP  Exercise #3: S/L hip abduction  Comment #3: HEP    Therapy will be discontinued at this time.  The patient will need a new referral to resume.    Thank you for your referral.  Ioana Angel, PT, DPT  7/1/2019  2:58 PM

## 2021-05-31 ENCOUNTER — RECORDS - HEALTHEAST (OUTPATIENT)
Dept: ADMINISTRATIVE | Facility: CLINIC | Age: 56
End: 2021-05-31

## 2021-05-31 VITALS — WEIGHT: 175 LBS | BODY MASS INDEX: 35.65 KG/M2

## 2021-05-31 VITALS — BODY MASS INDEX: 34.63 KG/M2 | WEIGHT: 170 LBS

## 2021-06-01 VITALS — WEIGHT: 185 LBS | BODY MASS INDEX: 38.83 KG/M2 | HEIGHT: 58 IN

## 2021-06-02 VITALS — BODY MASS INDEX: 36.66 KG/M2 | WEIGHT: 176.9 LBS

## 2021-06-02 VITALS — BODY MASS INDEX: 35.68 KG/M2 | WEIGHT: 177 LBS | HEIGHT: 59 IN

## 2021-06-03 VITALS — BODY MASS INDEX: 38.83 KG/M2 | WEIGHT: 189 LBS

## 2021-06-04 VITALS
DIASTOLIC BLOOD PRESSURE: 80 MMHG | TEMPERATURE: 97.3 F | HEIGHT: 59 IN | SYSTOLIC BLOOD PRESSURE: 118 MMHG | BODY MASS INDEX: 37.9 KG/M2 | RESPIRATION RATE: 16 BRPM | HEART RATE: 74 BPM | WEIGHT: 188 LBS

## 2021-06-05 VITALS
TEMPERATURE: 97.7 F | SYSTOLIC BLOOD PRESSURE: 128 MMHG | DIASTOLIC BLOOD PRESSURE: 86 MMHG | WEIGHT: 187.5 LBS | BODY MASS INDEX: 37.8 KG/M2 | HEART RATE: 79 BPM | HEIGHT: 59 IN

## 2021-06-05 VITALS
HEIGHT: 59 IN | DIASTOLIC BLOOD PRESSURE: 90 MMHG | SYSTOLIC BLOOD PRESSURE: 120 MMHG | HEART RATE: 76 BPM | BODY MASS INDEX: 37.29 KG/M2 | WEIGHT: 185 LBS

## 2021-06-06 NOTE — TELEPHONE ENCOUNTER
Please call patient.   Her blood work is all ok, but could work on her cholesterol. The bad choleseterol-LDL is 125, needs to be less than 100.   Eat healthy fats, decrease fried foods, exercise more.   Can recheck her diabetes in three months.

## 2021-06-06 NOTE — PROGRESS NOTES
Assessment/Plan:     1. Diet-controlled diabetes mellitus (H)  - Hepatitis C Antibody (Anti-HCV)  - HIV Antigen/Antibody Screening [place]  - Glycosylated Hemoglobin A1c  - LDL Cholesterol, Direct  - Comprehensive Metabolic Panel  - Microalbumin, Random Urine  - Ambulatory referral to Ophthalmology  2. Pure hypercholesterolemia  3. Morbid obesity (H)  4. Encounter for screening for human immunodeficiency virus (HIV)   - HIV Antigen/Antibody Screening [place]  Diabetes controlled.   LDL not at goal.   Urine microalbumin normal.   Needs annual eye exam.   Blood pressure controlled.   Nonsmoker.   Encouraged lifestyle modifications for weight loss, improvement of cholesterol.   Will follow up on this at three months and for next routine physical exam.     Subjective:       Ruiz Delacruz is an 54 y.o. female who presents for follow up of diabetes. Current symptoms include: none. Patient denies hyperglycemia, hypoglycemia , paresthesia of the feet, polydipsia, polyuria and visual disturbances. Evaluation to date has included: fasting blood sugar, fasting lipid panel, hemoglobin A1C and microalbuminuria. Home sugars: patient does not check sugars. Current treatments: more intensive attention to diet which has been effective. Last dilated eye exam none.     Patient here for follow-up of elevated blood pressure.  She is exercising and is adherent to a low-salt diet.  Blood pressure is well controlled at home. Cardiac symptoms: none. Patient denies: chest pain, exertional chest pressure/discomfort, lower extremity edema, near-syncope, orthopnea and paroxysmal nocturnal dyspnea. Cardiovascular risk factors: diabetes mellitus, dyslipidemia and sedentary lifestyle. Use of agents associated with hypertension: none. History of target organ damage: none.     Ruiz Delacruz is here for follow up of elevated cholesterol. Compliance with treatment has been good. The patient exercises infrequently. Patient denies muscle pain associated with  her medications.    The following portions of the patient's history were reviewed and updated as appropriate: allergies, current medications, past family history, past medical history, past social history, past surgical history and problem list.    Review of Systems  A 12 point comprehensive review of systems was negative except as noted.       Objective:     Vitals:    03/05/20 1314   BP: 118/80   Pulse: 74   Resp: 16   Temp: 97.3  F (36.3  C)       General Appearance:    Alert, cooperative, no distress, appears stated age   Head:    Normocephalic, without obvious abnormality, atraumatic   Eyes:    PERRL, conjunctiva/corneas clear, EOM's intact   Nose:   Mucosa moist, normal    Throat:   Lips, mucosa, and tongue normal; teeth and gums normal   Neck:   Supple, symmetrical, trachea midline, no adenopathy;     thyroid:  no enlargement/tenderness/nodules; no carotid    bruit or JVD   Lungs:     Clear to auscultation bilaterally, respirations unlabored    Heart:    Regular rate and rhythm, S1 and S2 normal, no murmur, rub   or gallop   Abdomen:     Soft, non-tender, bowel sounds active all four quadrants,     no masses, no organomegaly   Extremities:   Extremities normal, atraumatic, no cyanosis or edema   Pulses:   2+ and symmetric all extremities   Skin:   Skin color, texture, turgor normal, no rashes or lesions   Neurologic:   No focal deficits     Laboratory:    Lab Results   Component Value Date    CHOL 174 12/19/2018    CHOL 224 (H) 03/24/2017    CHOL 239 (H) 10/23/2015    HDL 45 (L) 12/19/2018    HDL 55 03/24/2017    HDL 55 10/23/2015    LDLDIRECT 125 03/05/2020    LDLDIRECT 127 01/15/2016         Recent Results (from the past 240 hour(s))   Hepatitis C Antibody (Anti-HCV)   Result Value Ref Range    Hepatitis C Ab Negative Negative   HIV Antigen/Antibody Screening Cascade   Result Value Ref Range    HIV Antigen / Antibody Negative Negative   Glycosylated Hemoglobin A1c   Result Value Ref Range    Hemoglobin A1c  7.1 (H) 3.5 - 6.0 %   LDL Cholesterol, Direct   Result Value Ref Range    Direct  <=129 mg/dl   Comprehensive Metabolic Panel   Result Value Ref Range    Sodium 146 (H) 136 - 145 mmol/L    Potassium 4.2 3.5 - 5.0 mmol/L    Chloride 110 (H) 98 - 107 mmol/L    CO2 23 22 - 31 mmol/L    Anion Gap, Calculation 13 5 - 18 mmol/L    Glucose 91 70 - 125 mg/dL    BUN 19 8 - 22 mg/dL    Creatinine 0.80 0.60 - 1.10 mg/dL    GFR MDRD Af Amer >60 >60 mL/min/1.73m2    GFR MDRD Non Af Amer >60 >60 mL/min/1.73m2    Bilirubin, Total 0.6 0.0 - 1.0 mg/dL    Calcium 9.4 8.5 - 10.5 mg/dL    Protein, Total 7.3 6.0 - 8.0 g/dL    Albumin 4.2 3.5 - 5.0 g/dL    Alkaline Phosphatase 114 45 - 120 U/L    AST 25 0 - 40 U/L    ALT 36 0 - 45 U/L   Microalbumin, Random Urine   Result Value Ref Range    Microalbumin, Random Urine 1.69 0.00 - 1.99 mg/dL    Creatinine, Urine 114.8 mg/dL    Microalbumin/Creatinine Ratio Random Urine 14.7 <=19.9 mg/g         Ray Mcgarry MD

## 2021-06-07 NOTE — TELEPHONE ENCOUNTER
Controlled Substance Refill Request  Medication Name:   Requested Prescriptions     Pending Prescriptions Disp Refills     acetaminophen (TYLENOL EXTRA STRENGTH) 500 MG tablet 500 tablet 2     Sig: Take 2 tablets (1,000 mg total) by mouth every 6 (six) hours as needed for pain.     codeine-guaiFENesin (GUAIFENESIN AC)  mg/5 mL liquid 120 mL 0     Sig: Take 10 mL by mouth 3 (three) times a day as needed for cough.     lancets (ONETOUCH DELICA LANCETS) 30 gauge Misc 100 each 3     Sig: Use to check blood sugar once daily     blood glucose meter (GLUCOMETER) 1 each 0     Sig: Dispense glucometer brand per patient's insurance at pharmacy discretion.     blood glucose test (GLUCOSE BLOOD) strips 50 strip 11     Sig: Testdaily  as directed; Brand to match glucometer and  insurance     Date Last Fill: 4/27/20  [facility]  Submit electronically to pharmacy  Controlled Substance Agreement on file:   Encounter-Level CSA Scan Date:    There are no encounter-level csa scan date.        Last office visit:  3/5/20         Provider Refill Request  Medication:  Tylenol  RN can NOT refill this medication per RN refill protocol because med is not covered by policy/route to provider     Rx renewed per Medication Renewal policy  Meter, test strips, lancet

## 2021-06-08 NOTE — TELEPHONE ENCOUNTER
Coronavirus (COVID-19) Notification    Reason for call  Notify of POSITIVE  COVID-19 lab result, assess symptoms,  review [facility] recommendations    Lab Result   Lab test for 2019-nCoV rRt-PCR or SARS-COV-2 PCR  Oropharyngeal AND/OR nasopharyngeal swabs were POSITIVE for 2019-nCoV RNA [OR] SARS-COV-2 RNA (COVID-19) RNA     We have been unable to reach Patient by phone at this time to notify of their Positive COVID-19 result.  Left voicemail message requesting a call back between 8 am to 6:30 p.m. to [phone] [facility] for results.   (Weekends, this line is available from 10A to 6:30P)     POSITIVE COVID-19 Letter sent.    [Name]  Lynette Nissen RN

## 2021-06-08 NOTE — TELEPHONE ENCOUNTER
Coronavirus (COVID-19) Notification    Patient  Ruiz  Spoke with pt's daughter Trini    Reason for call  Notify of Positive Coronavirus (COVID-19) lab results, assess symptoms,  review [facility] recommendations    Lab Result    Lab test:  2019-nCoV rRt-PCR or SARS-CoV-2 PCR    Oropharyngeal AND/OR nasopharyngeal swabs is POSITIVE for 2019-nCoV RNA/SARS-COV-2 PCR (COVID-19 virus)    RN Recommendations/Instructions per [facility] Coronavirus COVID-19 recommendations    Brief introduction script  Hi, My name is PAGE Buckley and I am calling on behalf of [facility].  We were notified that your Coronavirus test (COVID-19) for was POSITIVE for the virus.  I have some information to relay to you but first I wanted to mention that the [place] Dept of Health will be contacting you shortly [it's possible MD already called Patient] to talk to you more about how you are feeling and other people you have had contact with who might now also have the virus.  Also, [facility] is Partnering with the [facility] for Covid-19 research, you may be contacted directly by research staff.    Assessment (Inquire about Patient's current symptoms)  Was initally seen for fever, sore throat, cough, chest tightness. Sxs started over a week ago (5/6 or 5/7)  Doing well now. Getting better. Fever has gone down. Not as much coughing or wheezing.    Daughter had multiple questions regarding the virus and other household members.    If at time of call, Patients symptoms hare worsened, the Patient should contact 911 or have someone drive them to Emergency Dept promptly:      If Patient calling 911, inform 911 personal that you have tested positive for the Coronavirus (COVID-19).  Place mask on and await 911 to arrive.    If Emergency Dept, If possible, please have another adult drive you to the Emergency Dept but you need to wear mask when in contact with other people.      Review information with Patient    Since you tested  POSITIVE for the COVID-19 virus, it is important that you protect others from being exposed and infected with this virus.    [For safety, it's very important to follow these rules.]    First, stay home and away from others (self-isolate) until:    You've had no fever--and no medicine that reduces fever--for 3 full days (72 hours). And      Your other symptoms have gotten better. For example, your cough or breathing has improved. And     At least 10 days have passed since your symptoms started.    During this time:    Stay in your own room (and use your own bathroom), if you can.    Stay away from others in your home. No hugging, kissing or shaking hands.    Don't let anyone visit.    Don't go to work, school or anywhere else.     Clean  high touch  surfaces often (doorknobs, counters, handles, etc.). Use a household cleaning spray or wipes.    Cover your mouth and nose with a mask, tissue or washcloth to avoid spreading germs.    Wash your hands and face often with soap and water.    You should not go back to work until you meet the guidelines above for ending your home isolation. You should meet these along with any other guidelines that your employer has.  Employers: This document serves as formal notice of your employee's medical guidelines for going back to work. They must meet the above guidelines before going back to work in person.    How can I take care of myself?  1. Get lots of rest. Drink extra fluids (unless a doctor has told you not to).    2. Take Tylenol (acetaminophen) for fever or pain. If you have liver or kidney problems, ask your family doctor if it's okay to take Tylenol.     Take either:     650 mg (two 325 mg pills) every 4 to 6 hours, or     1,000 mg (two 500 mg pills) every 8 hours as needed.     Note: Don't take more than 3,000 mg in one day. Acetaminophen is found in many medicines (both prescribed and over-the-counter medicines). Read all labels to be sure you don't take too much.  For  children, check the Tylenol bottle for the right dose. The dose is based on the child's age or weight.  3. If you have other health problems (like cancer, heart failure, an organ transplant or severe kidney disease): Call your specialty clinic if you don't feel better in the next 2 days.    4. Know when to call 911: If your breathing is so bad that it keeps you from doing normal activities, call 911 or go to the emergency room. Tell them that you've been staying home and may have COVID-19.  5. Sign up for [employer]. We know it's scary to hear that you have COVID-19. We want to track your symptoms to make sure you're okay over the next 2 weeks. Please look for an email from [employer]--this is a free, online program that we'll use to keep in touch. To sign up, follow the link in the email. Learn more at [email or web address].    Where can I get more information?    To learn the [place]'s guidelines for staying home, please visit the [facility] of Health website at [email or web address].    To learn more about COVID-19 and how to care for yourself at home, please visit the CDC website at https://www.cdc.gov/coronavirus/2019-ncov/about/steps-when-sick.html.    For more options for care at [facility], please visit our website at [email or web address]      [place] Dept of Health ([facility]) COVID-19 Hotline:   [phone]    Positive COVID-19 letter Sent (Yes/No):  Yes    [Name]    JEOVANNY Perez, RN

## 2021-06-08 NOTE — PROGRESS NOTES
"Ruiz Delacruz is a 54 y.o. female who is being evaluated via a billable telephone visit.      The patient has been notified of following:     \"This telephone visit will be conducted via a call between you and your physician/provider. We have found that certain health care needs can be provided without the need for a physical exam.  This service lets us provide the care you need with a short phone conversation.  If a prescription is necessary we can send it directly to your pharmacy.  If lab work is needed we can place an order for that and you can then stop by our lab to have the test done at a later time.    Telephone visits are billed at different rates depending on your insurance coverage. During this emergency period, for some insurers they may be billed the same as an in-person visit.  Please reach out to your insurance provider with any questions.    If during the course of the call the physician/provider feels a telephone visit is not appropriate, you will not be charged for this service.\"    Patient has given verbal consent to a Telephone visit? Yes    What phone number would you like to be contacted at? [phone]    Patient would like to receive their AVS by AVS Preference: Mail a copy.    Additional provider notes: [facility] COVID-19 Follow Up Visit   How are you feeling today? Better  In the past 24 hours have you had shortness of breath when speaking, walking, or climbing stairs? I don't have breathing problems  Do you have a cough? I don't have a cough  When is the last time you had a fever greater than 100? 2 weeks  Are you having any other symptoms? NONE   Do you have any other stressors you would like to discuss with your provider? No     Subjective  [age] year old female calls for follow up.   Telephone visit completed due to current pandemic of covid 19.   She was found to be positive for covid 19 on 5/13/20.   Has history of diabetes, hyperlipidemia, obesity.   She notes her cough is better. Is still " "wheezing intermittently though. She would like albuterol and a nebulizer machine. She has not used one in many years, thinks she would like to have one on hand. She denies feeling short of breath, no chest pain, no fever. Is tolerating diet. Normal urine and stool. Thinks her glucose is normal, has been in the 120s but needs new test strips. Of note, her diabetes has been well controlled. Last check in March. No other new concerns. Has good support from family. Her mood is ok.       ROS: 12 systems reviewed, all negative except for what is mentioned in HPI.     Past Medical History:   Diagnosis Date     Pregnancy          Patient Active Problem List   Diagnosis     Obesity     Low back pain     Leg weakness     Adjustment disorder     Incontinence     Diet-controlled diabetes mellitus (H)     Hyperlipidemia     Obesity (BMI 35.0-39.9) with comorbidity (H)     Past Surgical History:   Procedure Laterality Date     HYSTERECTOMY      d/t \"somethingin uterus that could become cancer\"     No family history on file.  Social History     Socioeconomic History     Marital status:      Spouse name: Not on file     Number of children: Not on file     Years of education: Not on file     Highest education level: Not on file   Occupational History     Not on file   Social Needs     Financial resource strain: Not on file     Food insecurity     Worry: Not on file     Inability: Not on file     Transportation needs     Medical: Not on file     Non-medical: Not on file   Tobacco Use     Smoking status: Never Smoker     Smokeless tobacco: Never Used   Substance and Sexual Activity     Alcohol use: No     Drug use: No     Sexual activity: Never   Lifestyle     Physical activity     Days per week: Not on file     Minutes per session: Not on file     Stress: Not on file   Relationships     Social connections     Talks on phone: Not on file     Gets together: Not on file     Attends Spiritism service: Not on file     " Active member of club or organization: Not on file     Attends meetings of clubs or organizations: Not on file     Relationship status: Not on file     Intimate partner violence     Fear of current or ex partner: Not on file     Emotionally abused: Not on file     Physically abused: Not on file     Forced sexual activity: Not on file   Other Topics Concern     Not on file   Social History Narrative     Not on file     Current Outpatient Medications on File Prior to Visit   Medication Sig Dispense Refill     acetaminophen (TYLENOL EXTRA STRENGTH) 500 MG tablet Take 2 tablets (1,000 mg total) by mouth every 6 (six) hours as needed for pain. 500 tablet 2     albuterol (ACCUNEB) 1.25 mg/3 mL nebulizer solution Take 3 mL (1.25 mg total) by nebulization every 6 (six) hours as needed for wheezing. 75 mL 3     albuterol (PROAIR HFA;PROVENTIL HFA;VENTOLIN HFA) 90 mcg/actuation inhaler Inhale 2 puffs every 4 (four) hours as needed for wheezing or shortness of breath. 1 each 0     albuterol (PROVENTIL HFA;VENTOLIN HFA) 90 mcg/actuation inhaler Inhale 2 puffs every 6 (six) hours as needed for wheezing. 1 Inhaler 5     atorvastatin (LIPITOR) 10 MG tablet Take 1 tablet (10 mg total) by mouth at bedtime. 90 tablet 3     blood glucose test (GLUCOSE BLOOD) strips Testdaily  as directed; Brand to match glucometer and  insurance 100 strip 3     calcium carbonate-vitamin D3 (CALCIUM 500 WITH D) 500 mg(1,250mg) -400 unit Tab Take 1 tablet by mouth daily. 30 each 6     codeine-guaiFENesin (GUAIFENESIN AC)  mg/5 mL liquid Take 10 mL by mouth 3 (three) times a day as needed for cough. 120 mL 0     ergocalciferol (ERGOCALCIFEROL) 50,000 unit capsule Take 1 capsule (50,000 Units total) by mouth once a week. 4 capsule 5     ibuprofen (ADVIL,MOTRIN) 600 MG tablet Take 1 tablet (600 mg total) by mouth 2 (two) times a day as needed for pain. 60 tablet 2     ketotifen (ZADITOR) 0.025 % (0.035 %) ophthalmic solution Apply 1 drops to affected  eye q 12 hours prn 10 mL 3     lancets (ONETOUCH DELICA LANCETS) 30 gauge Misc Use to check blood sugar once daily 100 each 3     loratadine (CLARITIN) 10 mg tablet Take 1 tablet (10 mg total) by mouth daily. 30 tablet 11     No current facility-administered medications on file prior to visit.          Assessment/Plan:  1. 2019 novel coronavirus disease (COVID-19)  2. Wheezing  - albuterol (PROAIR HFA;PROVENTIL HFA;VENTOLIN HFA) 90 mcg/actuation inhaler; Inhale 2 puffs every 4 (four) hours as needed for wheezing.  Dispense: 1 each; Refill: 1  3. Diet-controlled diabetes mellitus (H)  - blood glucose meter (GLUCOMETER); Dispense glucometer brand per patient's insurance at pharmacy discretion.  Dispense: 1 each; Refill: 0  4. Pure hypercholesterolemia  5. Diabetes mellitus due to underlying condition with hyperglycemia (H)  - blood glucose test strips; Use 1 each As Directed as needed. Dispense brand per patient's insurance at pharmacy discretion.  Dispense: 100 strip; Refill: 0  Improving.   Provided with albuterol HFA. Advised on appropriate use.   Discussed supportive care, discussed reasons to call or be seen urgently.   Diabetes supplies prescribed. Encouraged diabetic diet, continued monitoring, next diabetes visit in three months.       Phone call duration:  15 minutes    Ray Mcgarry MD

## 2021-06-09 NOTE — PROGRESS NOTES
Assessment/Plan:     1. Diet-controlled diabetes mellitus  2. Hyperlipidemia  Diet controlled diabetes. HbA1C is normal.   LDL not at goal.   Blood pressure at goal.   Needs to start ASA.   Needs annual eye exam. Urine microalbumin up to date.   Working with diabetes education.   - Lipid Cascade  - Comprehensive Metabolic Panel  - Vitamin D, Total (25-Hydroxy)  - Microalbumin, Random Urine  - Ambulatory referral to Ophthalmology    3. Vitamin D deficiency  Check vitamin d level. Likely needs to continue supplementation.   - Vitamin D, Total (25-Hydroxy)    4. Encounter for screening colonoscopy  Screening test discussed. Agrees to check fecal occult blood.   - Occult Blood(ICT); Future    5. Need for immunization against influenza  Update immunzations.   - Influenza, Seasonal Quad, Preservative Free 36+ Months    6. Encounter for screening mammogram for breast cancer  needs to schedule mammogram.  - Mammo Screening Bilateral; Future    7. Heartburn  8. Cough  Notes ongoing heartburn, chronic cough.   Taking omeprazole and zantac with relief. This was refilled. Will monitor this and determine if further evaluation is needed.   - ranitidine (ZANTAC) 150 MG tablet; Take 1 tablet (150 mg total) by mouth 2 (two) times a day.  Dispense: 60 tablet; Refill: 3      Subjective:       Ruiz Delacruz is an 51 y.o. female who presents for follow up of diabetes. Current symptoms include: none. Patient denies foot ulcerations, paresthesia of the feet, polydipsia and polyuria. Evaluation to date has included: fasting lipid panel, hemoglobin A1C and microalbuminuria. Home sugars: BGs consistently in an acceptable range. Current treatments: more intensive attention to diet which has been effective. Last dilated eye exam none.     Patient here for follow-up of elevated blood pressure.  She is not exercising and is adherent to a low-salt diet.  Blood pressure is well controlled at home. Cardiac symptoms: none. Patient denies: exertional  "chest pressure/discomfort, orthopnea and paroxysmal nocturnal dyspnea. Cardiovascular risk factors: diabetes mellitus, dyslipidemia and sedentary lifestyle. Use of agents associated with hypertension: none. History of target organ damage: none.     Ruiz Delacruz is here for follow up of elevated cholesterol. Compliance with treatment has been fair. The patient exercises infrequently. Patient denies muscle pain associated with her medications.    Lost to follow up with diabetes though seeing diabetes education.   Is fasting today. LDL needs to be rechecked.   Diabetes is generally controlled with diet.   No concerns but needs a refill on her medications.   Was recently seen for cough, is better, though has ongoing heartburn. She takes the two medications for this, feels like she needs this to control her symptoms. No blood in the stool. Tolerating diet. Nonsmoker.    No recent mammogram. No colonoscopy prior. Pap smear up to date.     The following portions of the patient's history were reviewed and updated as appropriate: allergies, current medications, past family history, past medical history, past social history, past surgical history and problem list.  Past Medical History:   Diagnosis Date     Pregnancy          Patient Active Problem List   Diagnosis     Obesity     Low back pain     Leg weakness     Adjustment disorder     Incontinence     Past Surgical History:   Procedure Laterality Date     HYSTERECTOMY      d/t \"somethingin uterus that could become cancer\"     History reviewed. No pertinent family history.  Social History     Social History     Marital status:      Spouse name: N/A     Number of children: N/A     Years of education: N/A     Occupational History     Not on file.     Social History Main Topics     Smoking status: Never Smoker     Smokeless tobacco: Not on file     Alcohol use No     Drug use: No     Sexual activity: No     Other Topics Concern     Not on file     Social History " Narrative     Current Outpatient Prescriptions on File Prior to Visit   Medication Sig Dispense Refill     albuterol (ACCUNEB) 1.25 mg/3 mL nebulizer solution Take 3 mL (1.25 mg total) by nebulization every 6 (six) hours as needed for wheezing. 75 mL 3     albuterol (PROVENTIL HFA;VENTOLIN HFA) 90 mcg/actuation inhaler Inhale 2 puffs every 6 (six) hours as needed for wheezing. 1 Inhaler 5     blood glucose meter (ONETOUCH ULTRA2) Dispense glucometer brand per patient's insurance at pharmacy discretion. 1 each 0     blood glucose test strips Use 1 each As Directed as needed. Dispense brand per patient's insurance at pharmacy discretion. 100 each 11     calcium carbonate-vitamin D3 (CALCIUM 500 WITH D) 500 mg(1,250mg) -400 unit Tab Take 1 tablet by mouth daily. 30 each 6     codeine-guaiFENesin (GUAIFENESIN AC)  mg/5 mL liquid Take 10 mL by mouth 3 (three) times a day as needed for cough. 120 mL 0     ergocalciferol (ERGOCALCIFEROL) 50,000 unit capsule Take 1 capsule (50,000 Units total) by mouth once a week. 4 capsule 3     lancets (ONETOUCH DELICA LANCETS) 30 gauge Misc Use to check blood sugar once daily 100 each 3     loratadine (CLARITIN) 10 mg tablet Take 1 tablet (10 mg total) by mouth daily. 30 tablet 11     No current facility-administered medications on file prior to visit.        Review of Systems  A 12 point comprehensive review of systems was negative except as noted.      Objective:     Vitals:    03/24/17 1022   BP: 118/82   Pulse: 72   Resp: 20       General Appearance:    Alert, cooperative, no distress, appears stated age   Head:    Normocephalic, without obvious abnormality, atraumatic   Eyes:    PERRL, conjunctiva/corneas clear, EOM's intact   Nose:   Mucosa moist, normal    Throat:   Lips, mucosa, and tongue normal; teeth and gums normal   Neck:   Supple, symmetrical, trachea midline, no adenopathy;     thyroid:  no enlargement/tenderness/nodules; no carotid    bruit or JVD   Lungs:      Clear to auscultation bilaterally, respirations unlabored    Heart:    Regular rate and rhythm, S1 and S2 normal, no murmur, rub   or gallop   Abdomen:     Soft, non-tender, bowel sounds active all four quadrants,     no masses, no organomegaly   Extremities:   Extremities normal, atraumatic, no cyanosis or edema   Pulses:   2+ and symmetric all extremities   Skin:   Skin color, texture, turgor normal, no rashes or lesions   Neurologic:   No focal deficits     Laboratory:  Recent Results (from the past 240 hour(s))   Lipid Cascade    Collection Time: 03/24/17 11:12 AM   Result Value Ref Range    Cholesterol 224 (H) <=199 mg/dL    Triglycerides 121 <=149 mg/dL    HDL Cholesterol 55 >=50 mg/dL    LDL Calculated 145 (H) <=129 mg/dL    Patient Fasting > 8hrs? Unknown    Comprehensive Metabolic Panel    Collection Time: 03/24/17 11:12 AM   Result Value Ref Range    Sodium 141 136 - 145 mmol/L    Potassium 4.6 3.5 - 5.0 mmol/L    Chloride 107 98 - 107 mmol/L    CO2 26 22 - 31 mmol/L    Anion Gap, Calculation 8 5 - 18 mmol/L    Glucose 102 70 - 125 mg/dL    BUN 12 8 - 22 mg/dL    Creatinine 0.79 0.60 - 1.10 mg/dL    GFR MDRD Af Amer >60 >60 mL/min/1.73m2    GFR MDRD Non Af Amer >60 >60 mL/min/1.73m2    Bilirubin, Total 0.8 0.0 - 1.0 mg/dL    Calcium 9.5 8.5 - 10.5 mg/dL    Protein, Total 7.4 6.0 - 8.0 g/dL    Albumin 4.0 3.5 - 5.0 g/dL    Alkaline Phosphatase 99 45 - 120 U/L    AST 19 0 - 40 U/L    ALT 26 0 - 45 U/L   Microalbumin, Random Urine    Collection Time: 03/24/17 11:12 AM   Result Value Ref Range    Microalbumin, Random Urine <0.50 0.00 - 1.99 mg/dL    Creatinine, Urine 85.9 mg/dL    Microalbumin/Creatinine Ratio Random Urine  <=19.9 mg/g       Ray Mcgarry MD

## 2021-06-09 NOTE — PROGRESS NOTES
Assessment: Follow up with Ruiz today, have not seen since 9.2016, missed last appt in 1.2017.  Comes in with meter, checked bg 5 times since 1.2017, states she ran out of strips, instructed RX at pharmacy for more strips.  Checks fasting bg, instructed to rotate fasting or before supper, check bg 2-3 times per week given A1c.     No current dm medication  Bg  Fasting     Before dinner                   140  127  128  126  133  99    A1c 5.9.    Continues with two meals per day, small bowl of brown rice with boiled meat and vege, eats gonzalez and kapune on occasion but watches serving size closely.  Snacks on fruit as desired and will on occasion have small glass of juice or Pepsi. Understands she cannot drink pop, does so only on special occasion and very small amount.  As stated in earlier notes, Ruiz has good basic understanding of cho foods, affect on bg and need of moderation and portion control.     Wt down 10# since 9.2016, applauded on this effort, states she watches serving sizes and walks on tread mill for 25-30 minutes daily.      Has not seen MD since 5.2016, instructed to make follow up appt today, Carolin the  will assist with this.     Plan: as above  Follow up CDE 8.2017    Subjective and Objective:      Ruiz Delacruz is referred by  for Diabetes Education.     Lab Results   Component Value Date    HGBA1C 5.9 03/14/2017         Current diabetes medications:  None      Goals       activity            1. Increase speed on treadmill when walking. 5.3.16  MET        monitoring            1. Check bg once daily, fasting or 2 after last meal. 2.16.16  met        nutrition            1. Eat two meals per day, measure brown rice using small bowl. 2.16.16    Working on measuring brown rice. 5.3.16  MET            Follow up:   CDE (certified diabetic educator)      Education:     Monitoring   Meter (per above goals): Discussed  Monitoring: Discussed  BG goals: Assessed and Discussed    Nutrition  Management  Nutrition Management: Assessed and Discussed  Weight: Assessed and Discussed  Portions/Balance: Discussed and Competent  Carb ID/Count: Competent  Menu Planning: Discussed  Physical Activity: Discussed and Competent      Acute Complications: Prevent, Detect, Treat:  Hypoglycemia: Assessed  Hyperglycemia: Assessed and Discussed    Chronic Complications  Foot Care:Competent  Skin Care: Competent  Eye: Discussed and Competent  ABC: Assessed and Discussed  Teeth:Competent  Goal Setting and Problem Solving: Assessed  Barriers: Assessed  Psychosocial Adjustments: Assessed      Time spent with the patient: 30 minutes for diabetes education and counseling.   Previous Education: yes  Visit Type:DSMT  Hours Remaining: DSMT 1.5 and MNT 2      Elsie Govea  3/14/2017

## 2021-06-11 ENCOUNTER — OFFICE VISIT - HEALTHEAST (OUTPATIENT)
Dept: FAMILY MEDICINE | Facility: CLINIC | Age: 56
End: 2021-06-11

## 2021-06-11 DIAGNOSIS — J45.21 EXACERBATION OF INTERMITTENT ASTHMA, UNSPECIFIED ASTHMA SEVERITY: ICD-10-CM

## 2021-06-11 NOTE — PROGRESS NOTES
"Ruiz Delacruz is a 55 y.o. female who is being evaluated via a billable telephone visit.      PATIENT HAS NOT BEEN TAKING ANY OF HER MEDICATIONS.  SHE RAN OUT AND WHEN SHE TRIED TO GET REFILLS, SHE COULD NOT GET ANYONE TO ANSWER HER CALLS.    The patient has been notified of following:     \"This telephone visit will be conducted via a call between you and your physician/provider. We have found that certain health care needs can be provided without the need for a physical exam.  This service lets us provide the care you need with a short phone conversation.  If a prescription is necessary we can send it directly to your pharmacy.  If lab work is needed we can place an order for that and you can then stop by our lab to have the test done at a later time.    Telephone visits are billed at different rates depending on your insurance coverage. During this emergency period, for some insurers they may be billed the same as an in-person visit.  Please reach out to your insurance provider with any questions.    If during the course of the call the physician/provider feels a telephone visit is not appropriate, you will not be charged for this service.\"    Patient has given verbal consent to a Telephone visit? Yes    What phone number would you like to be contacted at? [phone]    Patient would like to receive their AVS by AVS Preference: Mail a copy.    Additional provider notes:     Subjective: This patient had a virtual visit, telephone, due to the coronavirus pandemic.    This patient is a 55-year-old female she has diet-controlled diabetes, hyperlipidemia, obesity.    Has still a history of wheezing in the past and was on albuterol.    We discussed her medications.  And refilled those she also needed refill on blood glucose test strips and lancets.    Last A1c was 7.1 in March.  LDL was 125    She had normal liver test normal renal function microalbumin was negative.    She is not had any hypertension.    She will come in " for labs recheck A1c check blood pressure and get a flu shot at that time.    Regarding her meds I did refill atorvastatin, albuterol, calcium, vitamin D.    Down the line can get a vitamin D level.    She denies any problems or issues at this time.  Had COVID-19 back in May no residual.    She denies any numbness in her feet.  Denies any leg swelling.    Gets occasional low back pain        Tobacco status: She  reports that she has never smoked. She has never used smokeless tobacco.    Patient Active Problem List    Diagnosis Date Noted     Obesity (BMI 35.0-39.9) with comorbidity (H) 03/05/2020     Diet-controlled diabetes mellitus (H) 04/11/2018     Hyperlipidemia 04/11/2018     Obesity 10/23/2015     Low back pain 10/23/2015     Leg weakness 10/23/2015     Adjustment disorder 10/23/2015     Incontinence 10/23/2015       Current Outpatient Medications   Medication Sig Dispense Refill     acetaminophen (TYLENOL EXTRA STRENGTH) 500 MG tablet Take 2 tablets (1,000 mg total) by mouth every 6 (six) hours as needed for pain. 500 tablet 2     albuterol (PROAIR HFA;PROVENTIL HFA;VENTOLIN HFA) 90 mcg/actuation inhaler Inhale 2 puffs every 4 (four) hours as needed for wheezing. 1 each 1     atorvastatin (LIPITOR) 10 MG tablet Take 1 tablet (10 mg total) by mouth at bedtime. 30 tablet 2     blood glucose meter (GLUCOMETER) Dispense glucometer brand per patient's insurance at pharmacy discretion. 1 each 0     blood glucose test strips Use 1 each As Directed as needed. Dispense brand per patient's insurance at pharmacy discretion. 100 strip 1     calcium carbonate-vitamin D3 (CALCIUM 500 WITH D) 500 mg(1,250mg) -400 unit Tab Take 1 tablet by mouth daily. 30 each 2     ergocalciferol (ERGOCALCIFEROL) 1,250 mcg (50,000 unit) capsule Take 1 capsule (50,000 Units total) by mouth once a week. 4 capsule 2     lancets (ONETOUCH DELICA LANCETS) 30 gauge Misc Use to check blood sugar once daily 100 each 1     No current  facility-administered medications for this visit.        ROS:   10 point review of systems positive as outlined above otherwise negative    Objective:    LMP 06/06/2005   There is no height or weight on file to calculate BMI.      General: No acute distress    HEENT denies any congestion no nasal drainage no visual changes    Lungs: Nonlabored breathing no wheezing.    Heart: No palpitations or rapid heart rate    Abdomen denies any bloating or pain    Extremities without edema denies any numbness no ulcerations or atrophic changes in the feet.    Skin is normal no rashes    Results for orders placed or performed in visit on 07/09/20    DIABETES EYE EXAM   Result Value Ref Range    RETINOPATHY NEGATIVE        Assessment:  1. Diet-controlled diabetes mellitus (H)     2. Pure hypercholesterolemia  atorvastatin (LIPITOR) 10 MG tablet   3. Obesity (BMI 35.0-39.9) with comorbidity (H)     4. Wheezing  albuterol (PROAIR HFA;PROVENTIL HFA;VENTOLIN HFA) 90 mcg/actuation inhaler   5. Diabetes mellitus due to underlying condition with hyperglycemia (H)  blood glucose test strips    lancets (ONETOUCH DELICA LANCETS) 30 gauge Misc    Glycosylated Hemoglobin A1c   6. Health care maintenance  ergocalciferol (ERGOCALCIFEROL) 1,250 mcg (50,000 unit) capsule    calcium carbonate-vitamin D3 (CALCIUM 500 WITH D) 500 mg(1,250mg) -400 unit Tab     Diabetes mellitus diet controlled last A1c 7.1 recheck A1c continue on good diet    Refill on her test strips and lancets.    Hyperlipidemia, continue atorvastatin refill on that    No evidence of hypertension or microalbuminuria.,  Recheck blood pressure when she comes in    Immunization update with flu shot.    Healthcare maintenance continue on vitamin D and calcium.  Down the line check vitamin D level    Plan: As discussed above should be contacted with results    This transcription uses voice recognition software, which may contain typographical errors.    Phone call duration: 13  minutes, from 4:36 PM through 4:49 PM    Morgan Zuñiga MD

## 2021-06-11 NOTE — PROGRESS NOTES
"Assessment/plan  1. Trigger finger  Discussed options of treatment.   Trial of corticosteroid. Cautioned over possible adverse affects.   Will apply ice.   Will consider NSAIDs.   Follow up in six weeks if no change or worsening.     2. Allergic rhinitis  3. Allergic conjunctivitis  4. Seasonal allergies  - loratadine (CLARITIN) 10 mg tablet; Take 1 tablet (10 mg total) by mouth daily.  Dispense: 30 tablet; Refill: 11  Discussed options of treatment.   No signs of infection.   Restart oral antihistamine.   Discussed options of pataonol or zaditor, she defers for now. Also defers intranasal steroid use.           Subjective  [age] old female here with her .   She notes finger pain, catching, clicking, stiffness. Located in her right index finger and left ring finger.   This started two months ago. She says the catching is not changing.   She denies fall, injury. She has not taking anything for this yet. Has not had these symptoms before.   She also notes itchy eyes, runny nose, nasal congestion, for the last few months. She has not taken any medication for this. She notes use of some medication before. She denies fever, cough. She thinks she has seasonal allergies.     Past Medical History:   Diagnosis Date     Pregnancy          Patient Active Problem List   Diagnosis     Obesity     Low back pain     Leg weakness     Adjustment disorder     Incontinence     Past Surgical History:   Procedure Laterality Date     HYSTERECTOMY      d/t \"somethingin uterus that could become cancer\"     Cancer-related family history is not on file.    Social History     Social History     Marital status:      Spouse name: N/A     Number of children: N/A     Years of education: N/A     Occupational History     Not on file.     Social History Main Topics     Smoking status: Never Smoker     Smokeless tobacco: Not on file     Alcohol use No     Drug use: No     Sexual activity: No     Other Topics " Concern     Not on file     Social History Narrative     Current Outpatient Prescriptions on File Prior to Visit   Medication Sig Dispense Refill     albuterol (ACCUNEB) 1.25 mg/3 mL nebulizer solution Take 3 mL (1.25 mg total) by nebulization every 6 (six) hours as needed for wheezing. 75 mL 3     albuterol (PROVENTIL HFA;VENTOLIN HFA) 90 mcg/actuation inhaler Inhale 2 puffs every 6 (six) hours as needed for wheezing. 1 Inhaler 5     atorvastatin (LIPITOR) 10 MG tablet Take 1 tablet (10 mg total) by mouth at bedtime. 90 tablet 3     blood glucose meter (ONETOUCH ULTRA2) Dispense glucometer brand per patient's insurance at pharmacy discretion. 1 each 0     blood glucose test strips Use 1 each As Directed as needed. Dispense brand per patient's insurance at pharmacy discretion. 100 each 11     calcium carbonate-vitamin D3 (CALCIUM 500 WITH D) 500 mg(1,250mg) -400 unit Tab Take 1 tablet by mouth daily. 30 each 6     codeine-guaiFENesin (GUAIFENESIN AC)  mg/5 mL liquid Take 10 mL by mouth 3 (three) times a day as needed for cough. 120 mL 0     ergocalciferol (ERGOCALCIFEROL) 50,000 unit capsule Take 1 capsule (50,000 Units total) by mouth once a week. 4 capsule 5     lancets (ONETOUCH DELICA LANCETS) 30 gauge Misc Use to check blood sugar once daily 100 each 3     ranitidine (ZANTAC) 150 MG tablet Take 1 tablet (150 mg total) by mouth 2 (two) times a day. 60 tablet 3     No current facility-administered medications on file prior to visit.      Objective  Vitals:    06/06/17 1424   BP: 104/78   Pulse: 76   Temp: 97.3  F (36.3  C)       General Appearance:  Alert, cooperative, no distress, appears stated age   Head:  Normocephalic, without obvious abnormality, atraumatic   Eyes:  PERRL, conjunctiva/corneas clear, EOM's intact   Ears:  Normal TM's and external ear canals, both ears   Nose: Nares normal, septum midline,mucosa normal, no drainage    Throat: Lips, mucosa, and tongue normal   Neck: Supple, symmetrical,  trachea midline, no adenopathy;  thyroid: not enlarged, symmetric, no tenderness/mass/nodules; no carotid bruit or JVD   Lungs:   Clear to auscultation bilaterally, respirations unlabored   Heart:  Regular rate and rhythm, S1 and S2 normal, no murmur, rub, or gallop   Extremities: Right index finger, left fourth digit with clicking, catching on extension and flexion, no edema or effusion, no deformity, minimally tender   Skin: Skin color, texture, turgor normal, no rashes or lesions   Lymph nodes: Cervical, supraclavicular nodes normal   Neurologic: Normal

## 2021-06-12 NOTE — TELEPHONE ENCOUNTER
----- Message from Mogran Zuñiga MD sent at 10/1/2020  8:40 AM CDT -----  Please contact this patient, let her know that the diabetic level , A1c, look stable at 6.9 continue good diet for diabetes.  No additional medicine is indicated  Stay on the same other medications.    She should follow-up in 3 months

## 2021-06-12 NOTE — TELEPHONE ENCOUNTER
Called and relayed the message below to the patient's daughter, Trini (okay to speak with per JENNIFER). Daughter verbally understood. Follow up appointment was scheduled for 2/13/2020 with PCP.    Please review and sign the pended glucometer and testing strips. Pharmacy is pended in the chart.

## 2021-06-13 NOTE — TELEPHONE ENCOUNTER
Medication refill requested. Please authorize medication if appropriate. please due 90 day supply

## 2021-06-13 NOTE — TELEPHONE ENCOUNTER
Pt daughter Trini is requesting for pt glucose meter and glue test strips to be transfer from [facility] to [facility] on [address] [facility]. Please contact Trini with any questions.    blood glucose meter (GLUCOMETER) 1 each       blood glucose test strips 100 strip 1

## 2021-06-14 NOTE — PROGRESS NOTES
Assessment and Plan:     Patient has been advised of split billing requirements and indicates understanding: Yes  1. Routine general medical examination at a health care facility  - docusate sodium (COLACE) 100 MG capsule; Take 1 capsule (100 mg total) by mouth 2 (two) times a day as needed for constipation.  Dispense: 60 capsule; Refill: 0  - peg 400-propylene glycol (SYSTANE) 0.4-0.3 % Drop; Instill 1-2 drops each eye 2 times daily.  Dispense: 15 mL; Refill: 11  2. Diabetes mellitus due to underlying condition with hyperglycemia, without long-term current use of insulin (H)  3. Pure hypercholesterolemia  4. Mild intermittent asthma without complication  5. Obesity (BMI 35.0-39.9) with comorbidity (H)  6. Mood disorder (H)  - DULoxetine (CYMBALTA) 30 MG capsule; Take 1 capsule (30 mg total) by mouth daily.  Dispense: 30 capsule; Refill: 2  7. Chronic pain syndrome  - DULoxetine (CYMBALTA) 30 MG capsule; Take 1 capsule (30 mg total) by mouth daily.  Dispense: 30 capsule; Refill: 2  The patient's current medical problems were reviewed.  EHR reviewed.   Recent diabetes check completed.   Much concern over her mood, inability to complete her own tasks, ADLs in a patient without physical disability at her age. We discussed this is due to some pathology that is physical or mental. The daughter and patient agree this impairment is due to her depression which in turn causes her pain. She notes pain all over her body, no specific location that is worse. She has not sought treatment of the above because she says the pain and depression will always be there and there wasn't any point in treating these. After some discussion, she would like a trial of medication, no cognitive therapy or imaging. I would consider further evaluation at the next visit such as MRI and rule out of significant pathology such as multiple sclerosis.   Trial of cymbalta for now.  Cautioned over possible adverse affects. Follow up on this in one  month.    I have had an Advance Directives discussion with the patient.  The following health maintenance schedule was reviewed with the patient and provided in printed form in the after visit summary:   Health Maintenance   Topic Date Due     ASTHMA ACTION PLAN  1965     Asthma Control Test  1965     Pneumococcal Vaccine: Pediatrics (0 to 5 Years) and At-Risk Patients (6 to 64 Years) (1 of 1 - PPSV23) 06/04/1970     ADVANCE CARE PLANNING  06/04/1982     HEPATITIS B VACCINES (1 of 3 - Risk 3-dose series) 06/04/1983     ZOSTER VACCINES (1 of 2) 06/04/2014     PAP SMEAR  10/23/2020     HPV TEST  10/23/2020     MICROALBUMIN  03/05/2021     A1C  04/13/2021     DIABETIC EYE EXAM  07/09/2021     BMP  01/13/2022     DIABETIC FOOT EXAM  01/13/2022     LIPID  01/13/2022     COLORECTAL CANCER SCREENING  01/22/2022     MEDICARE ANNUAL WELLNESS VISIT  01/29/2022     MAMMOGRAM  01/13/2023     TD 18+ HE  05/30/2023     HEPATITIS C SCREENING  Completed     HIV SCREENING  Completed     INFLUENZA VACCINE RULE BASED  Completed     TDAP ADULT ONE TIME DOSE  Completed        Subjective:   Chief Complaint: Ruiz Delacruz is an 55 y.o. female here for an Annual Wellness visit.   HPI:    She has no concerns.   Her health risk assessment was reviewed. Testing reveals of concerns of dementia due her inability to complete ADLS. She refused to draw a clock. She reports pain all the time, located all over her body. Sometimes her arms and legs are so painful she needs help to dress, bath, toilet. She will not cook. She will not do chores. She lives with her adult children, they do everything for her. She she is not aware of any prior diagnosis. She has not sought evaluation or treatment.      Review of Systems:    Please see above.  The rest of the review of systems are negative for all systems.    Patient Care Team:  Ray Mcgarry MD as PCP - General (Family Medicine)  Ray Mcgarry MD as Assigned PCP     Patient Active Problem List  "  Diagnosis     Obesity     Low back pain     Leg weakness     Adjustment disorder     Incontinence     Diet-controlled diabetes mellitus (H)     Hyperlipidemia     Obesity (BMI 35.0-39.9) with comorbidity (H)     Diabetes mellitus due to underlying condition with hyperglycemia, without long-term current use of insulin (H)     Asthma     Past Medical History:   Diagnosis Date     Diabetes mellitus due to underlying condition with hyperglycemia, without long-term current use of insulin (H) 2021     Pregnancy           Past Surgical History:   Procedure Laterality Date     HYSTERECTOMY      d/t \"somethingin uterus that could become cancer\"      No family history on file.   Social History     Socioeconomic History     Marital status:      Spouse name: Not on file     Number of children: Not on file     Years of education: Not on file     Highest education level: Not on file   Occupational History     Not on file   Social Needs     Financial resource strain: Not on file     Food insecurity     Worry: Not on file     Inability: Not on file     Transportation needs     Medical: Not on file     Non-medical: Not on file   Tobacco Use     Smoking status: Never Smoker     Smokeless tobacco: Never Used   Substance and Sexual Activity     Alcohol use: No     Drug use: No     Sexual activity: Never   Lifestyle     Physical activity     Days per week: Not on file     Minutes per session: Not on file     Stress: Not on file   Relationships     Social connections     Talks on phone: Not on file     Gets together: Not on file     Attends Gnosticist service: Not on file     Active member of club or organization: Not on file     Attends meetings of clubs or organizations: Not on file     Relationship status: Not on file     Intimate partner violence     Fear of current or ex partner: Not on file     Emotionally abused: Not on file     Physically abused: Not on file     Forced sexual activity: Not on file   Other " "Topics Concern     Not on file   Social History Narrative     Not on file      Current Outpatient Medications   Medication Sig Dispense Refill     acetaminophen (TYLENOL EXTRA STRENGTH) 500 MG tablet Take 2 tablets (1,000 mg total) by mouth every 6 (six) hours as needed for pain. 500 tablet 2     albuterol (PROAIR HFA;PROVENTIL HFA;VENTOLIN HFA) 90 mcg/actuation inhaler Inhale 2 puffs every 4 (four) hours as needed for wheezing. 1 each 1     atorvastatin (LIPITOR) 10 MG tablet Take 1 tablet (10 mg total) by mouth daily. 90 tablet 2     blood glucose meter (GLUCOMETER) Check blood sugar once daily.  Dispense glucometer brand per patient's insurance at pharmacy discretion. 1 each 0     blood glucose test strips Use 1 each As Directed as needed. Check blood sugar once daily.  Dispense brand per patient's insurance at pharmacy discretion. 100 strip 1     calcium carbonate-vitamin D3 (CALCIUM 500 WITH D) 500 mg(1,250mg) -400 unit Tab Take 1 tablet by mouth daily. 30 each 2     ergocalciferol (ERGOCALCIFEROL) 1,250 mcg (50,000 unit) capsule Take 1 capsule (50,000 Units total) by mouth once a week. 4 capsule 2     lancets (ONETOUCH DELICA LANCETS) 30 gauge Misc Use to check blood sugar once daily 100 each 1     No current facility-administered medications for this visit.       Objective:     Visit Vitals  /86 (Patient Site: Left Arm, Patient Position: Sitting, Cuff Size: Adult Large)   Pulse 79   Temp 97.7  F (36.5  C) (Other)   Ht 4' 10.78\" (1.493 m)   Wt 187 lb 8 oz (85 kg)   LMP 06/06/2005   BMI 38.15 kg/m         PHYSICAL EXAM  Vitals:    01/29/21 1300   BP: 128/86   Pulse: 79   Temp: 97.7  F (36.5  C)       General Appearance:  Alert, cooperative, no distress, appears stated age   Head:  Normocephalic, without obvious abnormality, atraumatic   Eyes:  PERRL, conjunctiva/corneas clear, EOM's intact   Throat: Lips, mucosa, and tongue normal; teeth and gums normal   Neck: Supple, symmetrical, trachea midline, no " adenopathy;  thyroid: not enlarged, symmetric, no tenderness/mass/nodules; no carotid bruit or JVD   Lungs:   Clear to auscultation bilaterally, respirations unlabored   Heart:  Regular rate and rhythm, S1 and S2 normal, no murmur, rub, or gallop   Abdomen:   Soft, non-tender, bowel sounds active all four quadrants,  no masses, no organomegaly   Extremities: Extremities normal, atraumatic, no cyanosis or edema   Skin: Skin color, texture, turgor normal, no rashes or lesions   Lymph nodes: Cervical, supraclavicular nodes normal   Neurologic: Normal, CN 2-12 intact, normal strength and tone, normal gait         Assessment Results 1/29/2021   Activities of Daily Living 5-6 - Severe functional impairment   Instrumental Activities of Daily Living 5-6 - Severe functional impairment   Get Up and Go Score Less than 12 seconds   Mini Cog Total Score 1   Some recent data might be hidden     A Mini-Cog score of 0-2 suggests the possibility of dementia, score of 3-5 suggests no dementia    Identified Health Risks:     The patient was provided with suggestions to help her develop a healthy physical lifestyle.   She is at risk for lack of exercise and has been provided with information to increase physical activity for the benefit of her well-being.  The patient reports that she has difficulty with activities of daily living. I have asked that the patient make a follow up appointment in 4 weeks where this issue will be further evaluated and addressed.  The patient reports that she has difficulty with instrumental activities of daily living.  She was advised to make a follow up appointment in 4 weeks to address this further.   Information on urinary incontinence and treatment options given to patient.  The patient was provided with suggestions to help her develop a healthy emotional lifestyle.   The patient's PHQ-9 score is consistent with mild depression. She was provided with information regarding depression and was advised to  schedule a follow up appointment in 4 weeks to further address this issue.  She is at risk for falling and has been provided with information to reduce the risk of falling at home.  Information regarding advance directives (living [place]), including where she can download the appropriate form, was provided to the patient via the AVS.

## 2021-06-14 NOTE — TELEPHONE ENCOUNTER
Reason for Call:  Medication or medication refill:eye drops stool softener lipitor and lancets all were supposed to be sent to pharmacy after her appt with dr holden 1/13    Do you use a [facility]?  Name of the pharmacy and phone number for the current request: no    Name of the medication requested: see above    Other request: n/a    Can we leave a detailed message on this number? Yes    Phone number patient can be reached at: Other phone number:  [phone]    Best Time: asap    Call taken on 1/25/2021 at 11:07 AM by Jennifer Porter

## 2021-06-14 NOTE — TELEPHONE ENCOUNTER
I was only able to find 2/4 medication refill request on patients med list and pended them for approval. Please advise.

## 2021-06-14 NOTE — PROGRESS NOTES
"[facility] EXAM note      Chief Complaint   Patient presents with     Knee Pain     X two weeks bilateral knee pain, constant pain     Due to language barrier, an  was present during the history-taking and subsequent discussion (and for part of the physical exam) with this patient.      Assessment & Plan    Problem List Items Addressed This Visit     None      Visit Diagnoses     Need for influenza vaccination    -  Primary    Relevant Orders    Influenza, Seasonal,Quad Inj, 36+ MOS (Completed)    Acute bilateral knee pain    : Likely osteoarthritis although strange that this started 2 weeks ago, possibly aggravated by overuse that she does not remember.  No mechanism of injury.  Would also put pes anserinus bursitis on the differential but the pain to palpation seem to be at the joint line versus inferior.  Will start today with standing x-ray and pain control.  We also discussed trial of cortisone injection.  Patient states she will give me a call in a couple weeks if her pain is not improved with the medication.  We will plan to do injection at that time.    Relevant Medications    ibuprofen (ADVIL,MOTRIN) 600 MG tablet    acetaminophen (TYLENOL EXTRA STRENGTH) 500 MG tablet    Other Relevant Orders    XR KNEE BILATERAL AP STANDING W SUNRISE          History    Ruiz Delacruz is a 52 y.o.  female who presents for the following issues:     Bilateral knee pain:  ×2 weeks, Constant, 8 out of 10, dull aching pain.  No injury or overuse that patient can remember.  Started on the right side and that got better and then moved to the left.  She has never had knee pain before.  No previous x-rays or injections.  She has been using a little bit of BenGay which has not really been helping.  She also tried it a little needling but this also did not do anything.  Denies any swelling, stiffness.  States her muscles feel tight.  Pain is located medially to anteriorly.  Pain feels like it is \"inside \".  Feels sore " with extension and flexion.      mEDICATIONS    Current Outpatient Prescriptions on File Prior to Visit   Medication Sig Dispense Refill     albuterol (ACCUNEB) 1.25 mg/3 mL nebulizer solution Take 3 mL (1.25 mg total) by nebulization every 6 (six) hours as needed for wheezing. 75 mL 3     albuterol (PROVENTIL HFA;VENTOLIN HFA) 90 mcg/actuation inhaler Inhale 2 puffs every 6 (six) hours as needed for wheezing. 1 Inhaler 5     atorvastatin (LIPITOR) 10 MG tablet Take 1 tablet (10 mg total) by mouth at bedtime. 90 tablet 3     blood glucose meter (ONETOUCH ULTRA2) Dispense glucometer brand per patient's insurance at pharmacy discretion. 1 each 0     blood glucose test strips Use 1 each As Directed as needed. Dispense brand per patient's insurance at pharmacy discretion. 100 each 11     calcium carbonate-vitamin D3 (CALCIUM 500 WITH D) 500 mg(1,250mg) -400 unit Tab Take 1 tablet by mouth daily. 30 each 6     codeine-guaiFENesin (GUAIFENESIN AC)  mg/5 mL liquid Take 10 mL by mouth 3 (three) times a day as needed for cough. 120 mL 0     ergocalciferol (ERGOCALCIFEROL) 50,000 unit capsule Take 1 capsule (50,000 Units total) by mouth once a week. 4 capsule 5     lancets (ONETOUCH DELICA LANCETS) 30 gauge Misc Use to check blood sugar once daily 100 each 3     loratadine (CLARITIN) 10 mg tablet Take 1 tablet (10 mg total) by mouth daily. 30 tablet 11     predniSONE (DELTASONE) 20 MG tablet Take 3 tablets at once daily for five days 15 tablet 0     ranitidine (ZANTAC) 150 MG tablet Take 1 tablet (150 mg total) by mouth 2 (two) times a day. 60 tablet 3     No current facility-administered medications on file prior to visit.        Pertinent past medical, surgical, social and family history reviewed and updated in Epic.    Social History     Social History     Marital status:      Spouse name: N/A     Number of children: N/A     Years of education: N/A     Occupational History     Not on file.     Social History  Main Topics     Smoking status: Never Smoker     Smokeless tobacco: Not on file     Alcohol use No     Drug use: No     Sexual activity: No     Other Topics Concern     Not on file     Social History Narrative         Review of systems     Pertinent Positives and negatives in HPI.     Physical Exam    /68 (Patient Site: Left Arm, Patient Position: Sitting, Cuff Size: Adult Large)  Pulse 72  Temp 97.7  F (36.5  C) (Oral)   Wt 175 lb (79.4 kg)  LMP 06/06/2005  BMI 35.65 kg/m2  GEN:  52 y.o. female sitting comfortably in no apparent distress.   Knee exam  Knees:  Strength 5/5 hip flexion, knee extension and flexion. Negative lachman's, anterior/posterior drawer testing bilaterally. Negative Mcmurrays. No laxity with varus and valgus testing bilaterally. Tenderness to palpation of medial joint lines bilaterally. No ballotement or swelling of kees.  Sensation grossly intact.  Gait: Normal    Stephanie Newman

## 2021-06-14 NOTE — PROGRESS NOTES
Assessment/Plan:     1. Diabetes mellitus due to underlying condition with hyperglycemia, without long-term current use of insulin (H)  - Lipid panel  - Hemoglobin A1c  - Comprehensive Metabolic Panel  - Diabetes foot exam  2. Pure hypercholesterolemia  3. Obesity (BMI 35.0-39.9) with comorbidity (H)  4. Visit for screening mammogram  - Mammo Screening Bilateral; Future  5. Screen for colon cancer  - Occult Blood(ICT)  Diabetes well controlled.   LDL not at goal.   Blood pressure controlled.   Eye exam normal.   Discussed lifestyle modifications to improve BMI.   Discussed need for annual exam. Stool cards discussed. Mammogram today.   Will return for pap smear.   Follow up on diabetes in three months.       Subjective:       Ruiz Delacruz is an 55 y.o. female who presents for follow up of diabetes. Current symptoms include: none. Patient denies hyperglycemia, hypoglycemia , paresthesia of the feet, polydipsia and polyuria. Evaluation to date has included: fasting lipid panel, hemoglobin A1C and microalbuminuria. Home sugars: BGs consistently in an acceptable range. Current treatments: no recent interventions. Last dilated eye exam July 2020.     Patient here for follow-up of elevated blood pressure.  She is exercising and is adherent to a low-salt diet.  Blood pressure is well controlled at home. Cardiac symptoms: none. Patient denies: chest pressure/discomfort, irregular heart beat, lower extremity edema, orthopnea and paroxysmal nocturnal dyspnea. Cardiovascular risk factors: diabetes mellitus, dyslipidemia, obesity (BMI >= 30 kg/m2) and sedentary lifestyle. Use of agents associated with hypertension: none. History of target organ damage: none.     Ruiz Delacruz is here for follow up of elevated cholesterol. Compliance with treatment has been fair. The patient exercises infrequently. Patient denies muscle pain associated with her medications.    The following portions of the patient's history were reviewed and updated  as appropriate: allergies, current medications, past family history, past medical history, past social history, past surgical history and problem list.    Review of Systems  A 12 point comprehensive review of systems was negative except as noted.       Objective:     Vitals:    01/13/21 1007   BP: 120/90   Pulse: 76       General Appearance:    Alert, cooperative, no distress, appears stated age   Head:    Normocephalic, without obvious abnormality, atraumatic   Eyes:    PERRL, conjunctiva/corneas clear, EOM's intact   Nose:   Mucosa moist, normal    Throat:   Lips, mucosa, and tongue normal; teeth and gums normal   Neck:   Supple, symmetrical, trachea midline, no adenopathy;     thyroid:  no enlargement/tenderness/nodules; no carotid    bruit or JVD   Lungs:     Clear to auscultation bilaterally, respirations unlabored    Heart:    Regular rate and rhythm, S1 and S2 normal, no murmur, rub   or gallop   Abdomen:     Soft, non-tender, bowel sounds active all four quadrants,     no masses, no organomegaly   Extremities:   Extremities normal, atraumatic, no cyanosis or edema   Pulses:   2+ and symmetric all extremities   Skin:   Skin color, texture, turgor normal, no rashes or lesions   Neurologic:   No focal deficits     Laboratory:  Recent Results (from the past 240 hour(s))   Lipid panel   Result Value Ref Range    Triglycerides 101 <=149 mg/dL    Cholesterol 224 (H) <=199 mg/dL    LDL Calculated 149 (H) <=129 mg/dL    HDL Cholesterol 55 >=50 mg/dL    Patient Fasting > 8hrs? No    Hemoglobin A1c   Result Value Ref Range    Hemoglobin A1c 7.8 (H) <=5.6 %   Comprehensive Metabolic Panel   Result Value Ref Range    Sodium 142 136 - 145 mmol/L    Potassium 4.3 3.5 - 5.0 mmol/L    Chloride 106 98 - 107 mmol/L    CO2 25 22 - 31 mmol/L    Anion Gap, Calculation 11 5 - 18 mmol/L    Glucose 122 70 - 125 mg/dL    BUN 20 8 - 22 mg/dL    Creatinine 0.87 0.60 - 1.10 mg/dL    GFR MDRD Af Amer >60 >60 mL/min/1.73m2    GFR MDRD Non  Af Amer >60 >60 mL/min/1.73m2    Bilirubin, Total 1.0 0.0 - 1.0 mg/dL    Calcium 9.4 8.5 - 10.5 mg/dL    Protein, Total 7.3 6.0 - 8.0 g/dL    Albumin 4.1 3.5 - 5.0 g/dL    Alkaline Phosphatase 107 45 - 120 U/L    AST 22 0 - 40 U/L    ALT 29 0 - 45 U/L         Ray Mcgarry MD

## 2021-06-14 NOTE — PROGRESS NOTES
Please call patient and inform:  Mild medial compartment arthritis is noted.  Patient to continue with pain medications.  If no improvement she will return as discussed for steroid injection.

## 2021-06-14 NOTE — TELEPHONE ENCOUNTER
Are you okay to prescribe eye drops for dry eyes and stool softener tablets to patient? DaughterTrini is requesting these two meds as well. I am unsure which medication to pend.

## 2021-06-15 NOTE — TELEPHONE ENCOUNTER
Left message #1 at [phone]. Postponing task out to a week and will try again. If patient returns call back, please help patient schedule an appointment per message below. Thanks!

## 2021-06-15 NOTE — TELEPHONE ENCOUNTER
Refill Approved    Rx renewed per Medication Renewal Policy. Medication was last renewed on 2/26/21, last OV 2/26/21.    Jenny Lua, [facility] Triage/Med Refill 2/26/2021     Requested Prescriptions   Pending Prescriptions Disp Refills     metFORMIN (GLUCOPHAGE) 500 MG tablet [Pharmacy Med Name: METFORMIN 500MG TABLETS] 180 tablet 0     Sig: TAKE 1 TABLET(500 MG) BY MOUTH TWICE DAILY WITH MEALS       Metformin Refill Protocol Passed - 2/26/2021  1:48 PM        Passed - Blood pressure in last 12 months     BP Readings from Last 1 Encounters:   01/29/21 128/86             Passed - LFT or AST or ALT in last 12 months     Albumin   Date Value Ref Range Status   01/13/2021 4.1 3.5 - 5.0 g/dL Final     Bilirubin, Total   Date Value Ref Range Status   01/13/2021 1.0 0.0 - 1.0 mg/dL Final     Alkaline Phosphatase   Date Value Ref Range Status   01/13/2021 107 45 - 120 U/L Final     AST   Date Value Ref Range Status   01/13/2021 22 0 - 40 U/L Final     ALT   Date Value Ref Range Status   01/13/2021 29 0 - 45 U/L Final     Protein, Total   Date Value Ref Range Status   01/13/2021 7.3 6.0 - 8.0 g/dL Final                Passed - GFR or Serum Creatinine in last 6 months     GFR MDRD Non Af Amer   Date Value Ref Range Status   01/13/2021 >60 >60 mL/min/1.73m2 Final     GFR MDRD Af Amer   Date Value Ref Range Status   01/13/2021 >60 >60 mL/min/1.73m2 Final             Passed - Visit with PCP or prescribing provider visit in last 6 months or next 3 months     Last office visit with prescriber/PCP: 1/13/2021 OR same dept: 1/13/2021 Ray Mcgarry MD OR same specialty: 1/13/2021 Ray Mcgarry MD Last physical: 1/29/2021 Last MTM visit: Visit date not found         Next appt within 3 mo: Visit date not found  Next physical within 3 mo: Visit date not found  Prescriber OR PCP: Ray Mcgarry MD  Last diagnosis associated with med order: 1. Type 2 diabetes mellitus with hyperglycemia, without long-term current use of  insulin (H)  - metFORMIN (GLUCOPHAGE) 500 MG tablet [Pharmacy Med Name: METFORMIN 500MG TABLETS]; TAKE 1 TABLET(500 MG) BY MOUTH TWICE DAILY WITH MEALS  Dispense: 180 tablet; Refill: 0     If protocol passes may refill for 12 months if within 3 months of last provider visit (or a total of 15 months).           Passed - A1C in last 6 months     Hemoglobin A1c   Date Value Ref Range Status   01/13/2021 7.8 (H) <=5.6 % Final               Passed - Microalbumin in last year      Microalbumin, Random Urine   Date Value Ref Range Status   03/05/2020 1.69 0.00 - 1.99 mg/dL Final

## 2021-06-15 NOTE — PROGRESS NOTES
"Ruiz Delacruz is a 55 y.o. female who is being evaluated via a billable telephone visit.      What phone number would you like to be contacted at? [phone]   How would you like to obtain your AVS? AVS Preference: Mail a copy.    Assessment & Plan     Type 2 diabetes mellitus with hyperglycemia, without long-term current use of insulin (H)  - lancets (ONETOUCH DELICA LANCETS) 30 gauge Misc  Dispense: 100 each; Refill: 1  - metFORMIN (GLUCOPHAGE) 500 MG tablet  Dispense: 60 tablet; Refill: 11  Pure hypercholesterolemia  Obesity (BMI 35.0-39.9) with comorbidity (H)  Mood disorder (H)  - DULoxetine (CYMBALTA) 30 MG capsule  Dispense: 30 capsule; Refill: 2  Chronic pain syndrome  - DULoxetine (CYMBALTA) 30 MG capsule  Dispense: 30 capsule; Refill: 2  We reviewed recent blood tests completed on 1/13/21 on the day of her physical.   Her HbA1C is quite elevated, started on Metformin. Reviewed diabetic diet, regular exercise, compliance needed to improve her glucose control. LDL not at goal. Discussed improvement in glycemic control may improve this. Cmp was normal.   Needs to recheck this in three months.   Reviewed her mood disorder and chronic pain, still present and not improved, though she feel like she wants to continue cymbalta. Refills provided. Follow up on this in three months.       BMI:   Estimated body mass index is 38.15 kg/m  as calculated from the following:    Height as of 1/29/21: 4' 10.78\" (1.493 m).    Weight as of 1/29/21: 187 lb 8 oz (85 kg).   Return in about 3 months (around 5/26/2021) for Recheck.    Ray Mcagrry MD  [facility]    Subjective   Ruiz Delacruz is 55 y.o. and presents today for the following health issues. Telephone visit completed due to the current pandemic.   Was seen for a physical on 1/13/21. Blood work was collected. Here for follow up.   She had diet controlled diabetes. This is worsening.   She has worsening mood and chronic pain. She has not been treated " for this before. Was started on cymbalta at the last visit. She is not sure if it is helping, her kids are still helping her with ADLs, she does not want to change anything, continue the same medicine.               Objective       Vitals:  No vitals were obtained today due to virtual visit.            Phone call duration: 12 minutes

## 2021-06-15 NOTE — TELEPHONE ENCOUNTER
----- Message from Santhosh Berry CMA sent at 2/26/2021  2:52 PM CST -----    ----- Message -----  From: Ray Mcgarry MD  Sent: 2/26/2021   1:26 PM CST  To: Ray Mcgarry Care Team Pool    Schedule follow up please 3 mo f2f

## 2021-06-15 NOTE — TELEPHONE ENCOUNTER
----- Message from Ray Mcgarry MD sent at 3/3/2021 10:54 AM CST -----  Please schedule follow up diabetes check in three months.

## 2021-06-17 ENCOUNTER — RECORDS - HEALTHEAST (OUTPATIENT)
Dept: ADMINISTRATIVE | Facility: OTHER | Age: 56
End: 2021-06-17

## 2021-06-17 NOTE — PROGRESS NOTES
Knee pain  Osteoarthritis denies joint redness, warmth, or swelling.     Obesity denies polyuria    Hyperlipidemia without card sxs  Hyperlipidemia on statin denies muscle pain     Post divorce few years ago.   left and  girl    ROS: as noted above    OBJECTIVE:   Vitals:    04/27/18 1624   BP: 114/77   Pulse: 76   Resp: 20   Temp: 97.4  F (36.3  C)      Eyes: non icteric, noninflamed  Lungs: no resp distress  Heart: regular  Ankles: no edema  Muscles: nontender  Mental status: euthymic  Neuro: nonfocal  Joints noninflamed  bmi 38 rising 20 pounds in two years      ASSESSMENT/PLAN:  Life style modification for wt diabetes mellitus osteoarthritis Hyperlipidemia     Quad isometric strengthening  follow up pcp      1. Hyperlipidemia     2. Diet-controlled diabetes mellitus     3. Obesity     4. Knee osteoarthritis  acetaminophen (TYLENOL EXTRA STRENGTH) 500 MG tablet   5. Acute bilateral knee pain  acetaminophen (TYLENOL EXTRA STRENGTH) 500 MG tablet   6. Encounter for screening mammogram for breast cancer  Mammo Screening Bilateral     Chronic issues stable/ same treatment.

## 2021-06-17 NOTE — PATIENT INSTRUCTIONS - HE
"Patient Instructions by Ioana Angel PT at 5/6/2019 11:00 AM     Author: Ioana Angel PT Service: -- Author Type: Physical Therapist    Filed: 5/6/2019 11:58 AM Encounter Date: 5/6/2019 Status: Signed    : Ioana Angel PT (Physical Therapist)        STRAIGHT LEG RAISE - SLR    While lying or sitting, raise up your leg with a straight knee.  Keep the opposite knee bent with the foot planted to the ground.    x10-15 repetitions  1-2 sets      BRIDGING    While lying on your back, tighten your lower abdominals, squeeze your buttocks and then raise your buttocks off the floor/bed as creating a \"Bridge\" with your body.    Hold 5 seconds  x10-15 repetitions  1-2 sets      HIP ABDUCTION - SIDELYING    While lying on your side, slowly raise up your top leg to the side. Keep your knee straight and maintain your toes pointed forward the entire time.     The bottom leg can be bent to stabilize your body.    x10-15 repetitions  1-2 sets                  "

## 2021-06-20 NOTE — LETTER
Letter by Nissen, Lynette, RN at      Author: Nissen, Lynette, RN Service: -- Author Type: --    Filed:  Encounter Date: 5/16/2020 Status: (Other)       5/16/2020        [facility] [address] [place] [address]    This letter provides a written record that you were tested for COVID-19 on 5/14/20.     Your result was positive.     This means that we found the virus that causes COVID-19 in your sample.    How can I protect others?    For safety, its very important to follow these rules.    First, stay home and away from others (self-isolate) until:      Youve had no fever--and no medicine that reduces fever--for 3 full days (72 hours). And?     Your other symptoms have gotten better. For example, your cough or breathing has improved. And?    At least 10 days have passed since your symptoms started.    During this time:      Stay in your own room (and use your own bathroom), if you can.    Stay away from others in your home. No hugging, kissing or shaking hands.    Dont let anyone visit.    Dont go to work, school or anywhere else.     Clean high touch surfaces often (doorknobs, counters, handles, etc.). Use a household cleaning spray or wipes.    Cover your mouth and nose with a mask, tissue or washcloth to avoid spreading germs.    Wash your hands and face often with soap and water.    You should not go back to work until you meet the guidelines above for ending your home isolation. You should meet these along with any other guidelines that your employer has.    Employers: This document serves as formal notice of your employees medical guidelines for going back to work. They must meet the above guidelines before going back to work in person.    How can I take care of myself?    1. Get lots of rest. Drink extra fluids (unless a doctor has told you not to).    2. Take Tylenol (acetaminophen) for fever or pain. If you have liver or kidney problems, ask your family doctor if its okay to take Tylenol.     Take  either:     650 mg (two 325 mg pills) every 4 to 6 hours, or?    1,000 mg (two 500 mg pills) every 8 hours as needed.     Note: Dont take more than 3,000 mg in one day. Acetaminophen is found in many medicines (both prescribed and over-the-counter medicines). Read all labels to be sure you dont take too much.  For children, check the Tylenol bottle for the right dose. The dose is based on the richi age or weight.    1. If you have other health problems (like cancer, heart failure, an organ transplant or severe kidney disease): Call your specialty clinic if you dont feel better in the next 2 days.    2. Know when to call 911: If your breathing is so bad that it keeps you from doing normal activities, call 911 or go to the emergency room. Tell them that youve been staying home and may have COVID-19.    3. Sign up for [employer]. We know its scary to hear that you have COVID-19. We want to track your symptoms to make sure youre okay over the next 2 weeks. Please look for an email from [employer]--this is a free, online program that well use to keep in touch. To sign up, follow the link in the email. Learn more at [email or web address].    4. Interested is participating in research? Visit the link below to view current clinical trials that apply to your situation:  [email or web address]    Where can I get more information?    To learn the [facility] guidelines for staying home, please visit the [place] Department of Health website at https://www.health.state.[place]/diseases/coronavirus/basics.html.    To learn more about COVID-19 and how to care for yourself at home, please visit the CDC website at https://www.cdc.gov/coronavirus/2019-ncov/about/steps-when-sick.html.    For more options for care at [facility], please visit our website at [email or web address]

## 2021-06-22 NOTE — PROGRESS NOTES
Assessment/Plan:     1. Wheezing  2. Acute bronchitis, unspecified organism  No respiratory distress. Deferred imaging for today. Start prednisone. Cautioned over possible adverse affects, affect on glucose. Start azithromycin. Increase clear fluids. Rest as needed. Discussed supportive care. Taper use of albuterol as appropriate.  Follow up if no change or worsening of symptoms.   - predniSONE (DELTASONE) 20 MG tablet; Take 3 tablets at once daily for five days.  Dispense: 15 tablet; Refill: 0  - azithromycin (ZITHROMAX Z-GABRIELE) 250 MG tablet; Take 2 tablets (500 mg) on  Day 1,  followed by 1 tablet (250 mg) once daily on Days 2 through 5..  Dispense: 6 tablet; Refill: 0    3. Diet-controlled diabetes mellitus (H)  - Influenza, Recombinant, Inj, Quadrivalent, PF, 18+YRS  4. Pure hypercholesterolemia  Diabetes is well controlled.   LDL not at goal. Blood pressure at goal. Needs annual eye exam. Encouraged continue exercise, diabetic low fat diet.   Follow up on diabetes in six months.   - Glycosylated Hemoglobin A1c  - Lipid Cascade  - Microalbumin, Random Urine  - Comprehensive Metabolic Panel    5. Encounter for screening mammogram for breast cancer  - Mammo Screening Bilateral      Subjective:    [age] year old female here with cough. It started a week ago. It is worsening now. Was seen a couple weeks ago at an urgent care. She was treated with some medication. Is not sure what it was. Was better but starting coughing again. She uses her inhaler. It helps a little. She denies fever. No trouble breathing but is wheezing. Normal appetite. Normal urine and stool. She would like her flu shot. She would like to complete her diabetes check today. She agrees to schedule mammogram.      Ruiz Delacruz is an 53 y.o. female who presents for follow up of diabetes. Current symptoms include: none. Patient denies hyperglycemia, hypoglycemia , paresthesia of the feet, polydipsia, polyuria and visual disturbances. Evaluation to  "date has included: fasting lipid panel, hemoglobin A1C and microalbuminuria. Home sugars: patient does not check sugars. Current treatments: no recent interventions. Last dilated eye exam 2017.     Patient here for follow-up of elevated blood pressure.  She is exercising and is adherent to a low-salt diet.  Blood pressure is well controlled at home. Cardiac symptoms: none. Patient denies: chest pain, lower extremity edema, orthopnea and paroxysmal nocturnal dyspnea. Cardiovascular risk factors: diabetes mellitus, dyslipidemia and obesity (BMI >= 30 kg/m2). Use of agents associated with hypertension: none. History of target organ damage: none.     Ruiz Delacruz is here for follow up of elevated cholesterol. Compliance with treatment has been fair. The patient exercises intermittently. Patient denies muscle pain associated with her medications.    The following portions of the patient's history were reviewed and updated as appropriate: allergies, current medications, past family history, past medical history, past social history, past surgical history and problem list.  Past Medical History:   Diagnosis Date     Pregnancy          Patient Active Problem List   Diagnosis     Obesity     Low back pain     Leg weakness     Adjustment disorder     Incontinence     Diet-controlled diabetes mellitus (H)     Hyperlipidemia     Past Surgical History:   Procedure Laterality Date     HYSTERECTOMY      d/t \"somethingin uterus that could become cancer\"     No family history on file.  Social History     Socioeconomic History     Marital status:      Spouse name: Not on file     Number of children: Not on file     Years of education: Not on file     Highest education level: Not on file   Social Needs     Financial resource strain: Not on file     Food insecurity - worry: Not on file     Food insecurity - inability: Not on file     Transportation needs - medical: Not on file     Transportation needs - non-medical: " Not on file   Occupational History     Not on file   Tobacco Use     Smoking status: Never Smoker     Smokeless tobacco: Never Used   Substance and Sexual Activity     Alcohol use: No     Drug use: No     Sexual activity: No   Other Topics Concern     Not on file   Social History Narrative     Not on file     Current Outpatient Medications on File Prior to Visit   Medication Sig Dispense Refill     acetaminophen (TYLENOL EXTRA STRENGTH) 500 MG tablet Take 2 tablets (1,000 mg total) by mouth every 6 (six) hours as needed for pain. 500 tablet 2     albuterol (ACCUNEB) 1.25 mg/3 mL nebulizer solution Take 3 mL (1.25 mg total) by nebulization every 6 (six) hours as needed for wheezing. 75 mL 3     albuterol (PROAIR HFA;PROVENTIL HFA;VENTOLIN HFA) 90 mcg/actuation inhaler Inhale 2 puffs every 4 (four) hours as needed for wheezing or shortness of breath. 1 each 0     albuterol (PROVENTIL HFA;VENTOLIN HFA) 90 mcg/actuation inhaler Inhale 2 puffs every 6 (six) hours as needed for wheezing. 1 Inhaler 5     atorvastatin (LIPITOR) 10 MG tablet Take 1 tablet (10 mg total) by mouth at bedtime. 90 tablet 3     azithromycin (ZITHROMAX) 250 MG tablet Take 500 mg (2 x 250 mg tablets) on day 1 followed by 250 mg (1 tablet) on days 2-5. 6 tablet 0     blood glucose meter (ONETOUCH ULTRA2) Dispense glucometer brand per patient's insurance at pharmacy discretion. 1 each 0     blood glucose test strips Use 1 each As Directed as needed. Dispense brand per patient's insurance at pharmacy discretion. 100 each 11     calcium carbonate-vitamin D3 (CALCIUM 500 WITH D) 500 mg(1,250mg) -400 unit Tab Take 1 tablet by mouth daily. 30 each 6     codeine-guaiFENesin (GUAIFENESIN AC)  mg/5 mL liquid Take 10 mL by mouth 3 (three) times a day as needed for cough. 120 mL 0     ergocalciferol (ERGOCALCIFEROL) 50,000 unit capsule Take 1 capsule (50,000 Units total) by mouth once a week. 4 capsule 5     ibuprofen (ADVIL,MOTRIN) 600 MG tablet Take 1  tablet (600 mg total) by mouth 2 (two) times a day as needed for pain. 60 tablet 2     lancets (ONETOUCH DELICA LANCETS) 30 gauge Misc Use to check blood sugar once daily 100 each 3     loratadine (CLARITIN) 10 mg tablet Take 1 tablet (10 mg total) by mouth daily. 30 tablet 11     ranitidine (ZANTAC) 150 MG tablet Take 1 tablet (150 mg total) by mouth 2 (two) times a day. 60 tablet 3     No current facility-administered medications on file prior to visit.        Review of Systems  A 12 point comprehensive review of systems was negative except as noted.       Objective:     Vitals:    12/19/18 0900   BP: 108/74   Pulse: 69   Resp: 20   Temp: 98.4  F (36.9  C)   SpO2: 97%       General Appearance:    Alert, cooperative, no distress, appears stated age   Head:    Normocephalic, without obvious abnormality, atraumatic   Eyes:    PERRL, conjunctiva/corneas clear, EOM's intact   Nose:   Mucosa moist, normal    Throat:   Lips, mucosa, and tongue normal; posterior pharynx normal   Neck:   Supple, symmetrical, trachea midline, no adenopathy;     thyroid:  no enlargement/tenderness/nodules; no carotid    bruit or JVD   Lungs:     Respirations unlabored, wheezing bilaterally no rales. No ronchi.     Heart:    Regular rate and rhythm, S1 and S2 normal, no murmur, rub   or gallop   Abdomen:     Soft, non-tender, bowel sounds active all four quadrants,     no masses, no organomegaly   Extremities:   Extremities normal, atraumatic, no cyanosis or edema   Pulses:   2+ and symmetric all extremities   Skin:   Skin color, texture, turgor normal, no rashes or lesions   Neurologic:   No focal deficits     Laboratory:  Recent Results (from the past 240 hour(s))   Glycosylated Hemoglobin A1c   Result Value Ref Range    Hemoglobin A1c 6.0 3.5 - 6.0 %   Lipid Cascade   Result Value Ref Range    Cholesterol 174 <=199 mg/dL    Triglycerides 92 <=149 mg/dL    HDL Cholesterol 45 (L) >=50 mg/dL    LDL Calculated 111 <=129 mg/dL    Patient  Fasting > 8hrs? Yes    Comprehensive Metabolic Panel   Result Value Ref Range    Sodium 143 136 - 145 mmol/L    Potassium 4.3 3.5 - 5.0 mmol/L    Chloride 107 98 - 107 mmol/L    CO2 25 22 - 31 mmol/L    Anion Gap, Calculation 11 5 - 18 mmol/L    Glucose 104 70 - 125 mg/dL    BUN 16 8 - 22 mg/dL    Creatinine 0.72 0.60 - 1.10 mg/dL    GFR MDRD Af Amer >60 >60 mL/min/1.73m2    GFR MDRD Non Af Amer >60 >60 mL/min/1.73m2    Bilirubin, Total 0.6 0.0 - 1.0 mg/dL    Calcium 9.1 8.5 - 10.5 mg/dL    Protein, Total 6.8 6.0 - 8.0 g/dL    Albumin 3.7 3.5 - 5.0 g/dL    Alkaline Phosphatase 87 45 - 120 U/L    AST 25 0 - 40 U/L    ALT 28 0 - 45 U/L   Microalbumin, Random Urine   Result Value Ref Range    Microalbumin, Random Urine 0.79 0.00 - 1.99 mg/dL    Creatinine, Urine 124.6 mg/dL    Microalbumin/Creatinine Ratio Random Urine 6.3 <=19.9 mg/g         Ray Mcgarry MD

## 2021-06-26 NOTE — PROGRESS NOTES
Progress Notes by Patric Larry DO at 10/19/2018 11:10 AM     Author: Patric Larry DO Service: -- Author Type: Physician    Filed: 10/20/2018  7:32 AM Encounter Date: 10/19/2018 Status: Signed    : Patric Larry DO (Physician)       Chief Complaint   Patient presents with   ? Cough     x 1-2 weeks. losing voice.  Throat sore   ? Headache        History of Present Illness: Rooming staff notes reviewed. Patient has headache discomfort in forehead, and occipital area of neck. She has had throat pain for about 1 week. No recent fever or chills. She does not feel any sinus congestion at exam. She does feel like she is working harder to breath at time of exam.     Review of systems: See history of present illness, otherwise negative.     Current Outpatient Prescriptions   Medication Sig Dispense Refill   ? acetaminophen (TYLENOL EXTRA STRENGTH) 500 MG tablet Take 2 tablets (1,000 mg total) by mouth every 6 (six) hours as needed for pain. 500 tablet 2   ? atorvastatin (LIPITOR) 10 MG tablet Take 1 tablet (10 mg total) by mouth at bedtime. 90 tablet 3   ? blood glucose meter (ONETOUCH ULTRA2) Dispense glucometer brand per patient's insurance at pharmacy discretion. 1 each 0   ? blood glucose test strips Use 1 each As Directed as needed. Dispense brand per patient's insurance at pharmacy discretion. 100 each 11   ? calcium carbonate-vitamin D3 (CALCIUM 500 WITH D) 500 mg(1,250mg) -400 unit Tab Take 1 tablet by mouth daily. 30 each 6   ? codeine-guaiFENesin (GUAIFENESIN AC)  mg/5 mL liquid Take 10 mL by mouth 3 (three) times a day as needed for cough. 120 mL 0   ? ibuprofen (ADVIL,MOTRIN) 600 MG tablet Take 1 tablet (600 mg total) by mouth 2 (two) times a day as needed for pain. 60 tablet 2   ? lancets (ONETOUCH DELICA LANCETS) 30 gauge Misc Use to check blood sugar once daily 100 each 3   ? albuterol (ACCUNEB) 1.25 mg/3 mL nebulizer solution Take 3 mL (1.25 mg total) by nebulization every 6 (six) hours as  "needed for wheezing. 75 mL 3   ? albuterol (PROAIR HFA;PROVENTIL HFA;VENTOLIN HFA) 90 mcg/actuation inhaler Inhale 2 puffs every 4 (four) hours as needed for wheezing or shortness of breath. 1 each 0   ? albuterol (PROVENTIL HFA;VENTOLIN HFA) 90 mcg/actuation inhaler Inhale 2 puffs every 6 (six) hours as needed for wheezing. 1 Inhaler 5   ? azithromycin (ZITHROMAX) 250 MG tablet Take 500 mg (2 x 250 mg tablets) on day 1 followed by 250 mg (1 tablet) on days 2-5. 6 tablet 0   ? ergocalciferol (ERGOCALCIFEROL) 50,000 unit capsule Take 1 capsule (50,000 Units total) by mouth once a week. 4 capsule 5   ? loratadine (CLARITIN) 10 mg tablet Take 1 tablet (10 mg total) by mouth daily. 30 tablet 11   ? predniSONE (DELTASONE) 20 MG tablet Take 3 tablets at once daily for five days 15 tablet 0   ? ranitidine (ZANTAC) 150 MG tablet Take 1 tablet (150 mg total) by mouth 2 (two) times a day. 60 tablet 3     No current facility-administered medications for this visit.      Past Medical History:   Diagnosis Date   ? Pregnancy           Past Surgical History:   Procedure Laterality Date   ? HYSTERECTOMY  2006    d/t \"somethingin uterus that could become cancer\"      Social History   Substance Use Topics   ? Smoking status: Never Smoker   ? Smokeless tobacco: Never Used   ? Alcohol use No        No family history on file.    Vitals:    10/19/18 1122 10/19/18 1225   BP: 94/64    Patient Site: Right Arm    Patient Position: Sitting    Cuff Size: Adult Regular    Pulse: 74 92   Resp: 20    Temp: 98.7  F (37.1  C)    TempSrc: Oral    SpO2: 94% 95%   Weight: 176 lb 14.4 oz (80.2 kg)        EXAM:   General: Vital signs reviewed. Patient is in no acute appearing distress except for occasional cough. Breathing is non labored appearing. Patient is alert and oriented x 3.   ENT: Ear exam shows mild right TM dullness and injection with left TM being clear without injection, right nasal turbinates are injected and edematous with mild " rhinorrhea, no pharyngeal injection with increased post nasal drainage noted.  Neck: supple with no adenopathy.  Heart:  Heart rate is normal, regular rhythm without murmur.  Lungs:  Lung sounds show slight expiratory wheezing bilaterally with fair airflow, and crackles noted in right lower lobe.  Repeat exam after the neb treatment showed she could breath better, with wheezing resolved but the RLL crackles remained.    Recent Results (from the past 24 hour(s))   Rapid Strep A Screen-Throat   Result Value Ref Range    Rapid Strep A Antigen No Group A Strep detected, presumptive negative No Group A Strep detected, presumptive negative    Results from exam reviewed with patient.    Assessment/Plan   1. Throat pain  Rapid Strep A Screen-Throat    Group A Strep, RNA Direct Detection, Throat   2. SOB (shortness of breath)  albuterol nebulizer solution 3 mL (PROVENTIL)   3. Coughing  albuterol nebulizer solution 3 mL (PROVENTIL)   4. RLL pneumonia (H)  albuterol nebulizer solution 3 mL (PROVENTIL)       Patient Instructions     I think the throat pain is from your coughing. We will notify you if the pending strep study is positive. Otherwise, you can assume the test was negative if not contacted over the next 48 hours. The same antibiotic to treat your pneumonia would also treat strep throat. Be seen again in 3 days if symptoms are not better, sooner if feeling any worse.      When You Have Pneumonia  You have been diagnosed with pneumonia. This is a serious lung infection. Most cases of pneumonia are caused by bacteria. Pneumonia most often occurs in older adults, young children, and people with chronic health problems.  Home care    Take your medicine exactly as directed. Dont skip doses. Continue taking your antibiotics as until they are all gone, even if you start to feel better. This will prevent the pneumonia from coming back.    Drink at least 8 glasses of water daily, unless directed otherwise. This helps to  loosen and thin secretions so that you can cough them up.    Use a cool-mist humidifier in your bedroom. Be sure to clean the humidifier daily.    Dont use medicines to suppress your cough unless your cough is dry, painful, or interferes with your sleep. Coughing up mucus is normal. You may use an expectorant if your healthcare provider says its okay.    You can use warm compresses or a heating pad on the lowest setting to relieve chest discomfort. Use several times a day for 15-20 minutes at a time. To prevent injury to your skin, set the temperature to warm, not hot. Dont put the compress or pad directly on your skin. Make certain it has a cover or wrap it in a towel. This is to prevent skin burns.    Get plenty of rest until your fever, shortness of breath, and chest pain go away.    Plan to get a flu shot every year. The flu is a common cause of pneumonia. Getting a flu shot every year can help prevent both the flu and pneumonia.  Getting the pneumococcal vaccine  Talk with your healthcare provider about getting the pneumococcal vaccine. Pneumococcal pneumonia is caused by bacteria that spread from person to person. It can cause minor problems, such as ear infections. But it can also turn into life-threatening illnesses of the lungs (pneumonia), the covering of the brain and spinal cord (meningitis), and the blood (bacteremia).  Children under 2 years of age, adults over age 65, people with certain health conditions, and smokers are at the highest risk of pneumococcal disease. This vaccine can help prevent pneumococcal disease in both adults and children. Some people should not have the vaccine. Make sure to ask your healthcare provider if you should have the vaccine.   Follow-up care  Make a follow-up appointment as directed by our staff.  When to call your healthcare provider  Call your healthcare provider right away if you have any of the following:    Fever of 100.4 F (38 C) or higher, or as directed by your  "healthcare provider    Mucus from the lungs (sputum) thats yellow, green, bloody, or smells bad    A large amount of sputum    Vomiting    Symptoms that get worse  Call 911  Call 911 right away if you have any of the following:    Chest pain    Trouble breathing    Blue lips or fingernails   Date Last Reviewed: 11/1/2016 2000-2017 [employer]. [address] [place] [address]. All rights reserved. This information is not intended as a substitute for professional medical care. Always follow your healthcare professional's instructions.        Using an Inhaler  Your healthcare provider may prescribe medicine that you breathe in using a metered-dose inhaler (MDI). An inhaler sends a measured amount of medicine in a fine mist.  Step 1:    Shake the inhaler and remove the cap.    Take a deep breath and let it out.  Step 2:    Close your lips around the end of the inhaler mouthpiece. Or if you were told to use the \"open-mouth\" method, hold the inhaler 1 to 2 inches from your mouth.  Step 3:    Breathe in slowly and deeply as you press down on the inhaler to release the medicine.    Inhale fully.  Step 4:    Hold your breath for a count of 10, or as long as you can comfortably.    Then breathe out slowly through your mouth.    Repeat these steps for each puff of medicine prescribed.             Important    If the inhaler is being used for the first time or has not been used for a while, prime it as directed by the product maker. You can find important information about the medicine in the package insert. This is the paper that comes with the medicine.    If you use more than one inhaler, make sure you know which one to use first.    Your healthcare provider or pharmacist can show you how to use your inhaler the right way. Even if you think you are using it the right way, it is still a good idea to check.   Date Last Reviewed: 10/1/2016    4047-5841 [employer]. [address] [place] [address]" Road, [place] [address]. All rights reserved. This information is not intended as a substitute for professional medical care. Always follow your healthcare professional's instructions.           Patric Larry,

## 2021-06-26 NOTE — PROGRESS NOTES
"Ruiz Delacruz is a 56 y.o. female who is being evaluated via a billable telephone visit.      What phone number would you like to be contacted at? [phone]  How would you like to obtain your AVS? AVS Preference: Mail a copy.    Assessment & Plan     Exacerbation of intermittent asthma, unspecified asthma severity  - albuterol (PROAIR HFA;PROVENTIL HFA;VENTOLIN HFA) 90 mcg/actuation inhaler  Dispense: 1 each; Refill: 1  - predniSONE (DELTASONE) 20 MG tablet  Dispense: 15 tablet; Refill: 0  Treated for asthma exacerbation.   We reviewed possible etiology.   Discussed supportive care.   Start prednisone. Albuterol was refilled.   Advised to follow up on this in one to two weeks, sooner if no better.   She is aware of reasons to be seen urgently.     }     BMI:   Estimated body mass index is 38.15 kg/m  as calculated from the following:    Height as of 1/29/21: 4' 10.78\" (1.493 m).    Weight as of 1/29/21: 187 lb 8 oz (85 kg).     Return in about 2 weeks (around 6/25/2021) for Recheck.    Ray Mcgarry MD  [facility]    Subjective   Ruiz Delacruz is 56 y.o. female who is concerned about cough, wheezing. Is started a few days ago. It seems to be getting better but she needs a refill on her inhaler. There is no fever. No trouble breathing. She did get her covid 19 vaccine, is not aware of any new exposure. Is tolerating diet. Normal urine and stool. She has been using her inhaler every day for the last three days. It seems like she has some noisy breathing also.         Objective       Vitals:  No vitals were obtained today due to virtual visit.      Phone call duration: 8 minutes    "

## 2021-07-13 ENCOUNTER — RECORDS - HEALTHEAST (OUTPATIENT)
Dept: ADMINISTRATIVE | Facility: CLINIC | Age: 56
End: 2021-07-13

## 2021-08-06 NOTE — PATIENT INSTRUCTIONS - HE
Patient Instructions by Ray Mcgarry MD at 1/29/2021 12:40 PM     Author: Ray Mcgarry MD Service: -- Author Type: Physician    Filed: 1/29/2021  1:23 PM Encounter Date: 1/29/2021 Status: Signed    : Ray Mcgarry MD (Physician)         Patient Education     Your Health Risk Assessment indicates you feel you are not in good physical health.    A healthy lifestyle helps keep the body fit and the mind alert. It helps protect you from disease, helps you fight disease, and helps prevent chronic disease (disease that doesn't go away) from getting worse. This is important as you get older and begin to notice twinges in muscles and joints and a decline in the strength and stamina you once took for granted. A healthy lifestyle includes good healthcare, good nutrition, weight control, recreation, and regular exercise. Avoid harmful substances and do what you can to keep safe. Another part of a healthy lifestyle is stay mentally active and socially involved.    Good healthcare     Have a wellness visit every year.     If you have new symptoms, let us know right away. Don't wait until the next checkup.     Take medicines exactly as prescribed and keep your medicines in a safe place. Tell us if your medicine causes problems.   Healthy diet and weight control     Eat 3 or 4 small, nutritious, low-fat, high-fiber meals a day. Include a variety of fruits, vegetables, and whole-grain foods.     Make sure you get enough calcium in your diet. Calcium, vitamin D, and exercise help prevent osteoporosis (bone thinning).     If you live alone, try eating with others when you can. That way you get a good meal and have company while you eat it.     Try to keep a healthy weight. If you eat more calories than your body uses for energy, it will be stored as fat and you will gain weight.     Recreation   Recreation is not limited to sports and team events. It includes any activity that provides relaxation, interest, enjoyment, and  exercise. Recreation provides an outlet for physical, mental, and social energy. It can give a sense of worth and achievement. It can help you stay healthy.       Patient Education     Exercise for a Healthier Heart  You may wonder how you can improve the health of your heart. If youre thinking about exercise, youre on the right track. You dont need to become an athlete, but you do need a certain amount of brisk exercise to help strengthen your heart. If you have been diagnosed with a heart condition, your doctor may recommend exercise to help stabilize your condition. To help make exercise a habit, choose safe, fun activities.       Be sure to check with your health care provider before starting an exercise program.    Why exercise?  Exercising regularly offers many healthy rewards. It can help you do all of the following:    Improve your blood cholesterol levels to help prevent further heart trouble    Lower your blood pressure to help prevent a stroke or heart attack    Control diabetes, or reduce your risk of getting this disease    Improve your heart and lung function    Reach and maintain a healthy weight    Make your muscles stronger and more limber so you can stay active    Prevent falls and fractures by slowing the loss of bone mass (osteoporosis)    Manage stress better  Exercise tips  Ease into your routine. Set small goals. Then build on them.  Exercise on most days. Aim for a total of 150 or more minutes of moderate to  vigorous intensity activity each week. Consider 40 minutes, 3 to 4 times a week. For best results, activity should last for 40 minutes on average. It is OK to work up to the 40 minute period over time. Examples of moderate-intensity activity is walking one mile in 15 minutes or 30 to 45 minutes of yard work.  Step up your daily activity level. Along with your exercise program, try being more active throughout the day. Walk instead of drive. Do more household tasks or yard work.  Choose  one or more activities you enjoy. Walking is one of the easiest things you can do. You can also try swimming, riding a bike, or taking an exercise class.  Stop exercising and call your doctor if you:    Have chest pain or feel dizzy or lightheaded    Feel burning, tightness, pressure, or heaviness in your chest, neck, shoulders, back, or arms    Have unusual shortness of breath    Have increased joint or muscle pain    Have palpitations or an irregular heartbeat      5076-4528 [employer]. [address] [place] [address]. All rights reserved. This information is not intended as a substitute for professional medical care. Always follow your healthcare professional's instructions.         Patient Education   Activities of Daily Living  Your Health Risk Assessment indicates you have difficulties with activities of daily living such as eating, getting dressed, grooming, bathing, walking, or using the toilet. Please make a follow up appointment for us to address this issue in more detail.     Patient Education   Instrumental Activities of Daily Living  Your Health Risk Assessment indicates you have difficulties with instrumental activities of daily living which include laundry, housekeeping, banking, shopping, using the telephone, food preparation, transportation, or taking your own medications. Please make a follow up appointment for us to address this issue in more detail.    [employer] has resources available on the following website: [email or web address]     Also, here is a local agency that provides help with meals and other assistance:   [facility] Line: 308.125.6230     Patient Education   Urinary Incontinence, Female (Adult)  Urinary incontinence means loss of control of the bladder. This problem affects many women, especially as they get older. If you have incontinence, you may be embarrassed to ask for help. But know that this problem can be treated.  Types of  Incontinence  There are different types of incontinence. Two of the main types are described here. You can have more than one type.    Stress incontinence. With this type, urine leaks when pressure (stress) is put on the bladder. This may happen when you cough, sneeze, or laugh. Stress incontinence most often occurs because the pelvic floor muscles that support the bladder and urethra are weak. This can happen after pregnancy and vaginal childbirth or a hysterectomy. It can also be due to excess body weight or hormone changes.    Urge incontinence (also called overactive bladder). With this type, a sudden urge to urinate is felt often. This may happen even though there may not be much urine in the bladder. The need to urinate often during the night is common. Urge incontinence most often occurs because of bladder spasms. This may be due to bladder irritation or infection. Damage to bladder nerves or pelvic muscles, constipation, and certain medicines can also lead to urge incontinence.  Treatment of urinary incontinence depends on the cause. Further evaluation is needed to find the type you have. This will likely include an exam and certain tests. Based on the results, you and your healthcare provider can then plan treatment. Until a diagnosis is made, the home care tips below can help relieve symptoms.  Home care    Do pelvic floor muscle exercises, if they are prescribed. The pelvic floor muscles help support the bladder and urethra. Many women find that their symptoms improve when doing special exercises that strengthen these muscles. To do the exercises contract the muscles you would use to stop your stream of urine, but do this when youre not urinating. Hold for 10 seconds, then relax. Repeat 10 to 20 times in a row, at least 3 times a day. Your provider may give you other instructions for how to do the exercises and how often.    Keep a bladder diary. This helps track how often and how much you urinate over a  set period of time. Bring this diary with you to your next visit with the provider. The information can help your provider learn more about your bladder problem.    Lose weight, if advised to by your provider. Excess weight puts pressure on the bladder. Your provider can help you create a weight-loss plan thats right for you. This may include exercising more and making certain diet changes.    Don't consume foods and drinks that may irritate the bladder. These can include alcohol and caffeinated drinks.    Quit smoking. Smoking and other tobacco use can lead to chronic cough that strains the pelvic floor muscles. Smoking may also damage the bladder and urethra. Talk with your provider about treatments or methods you can use to quit smoking.    If drinking large amounts of fluid causes you to have symptoms, you may be advised to limit your fluid intake. You may also be advised to drink most of your fluids during the day and to limit fluids at night.    If youre worried about urine leakage or accidents, you may wear absorbent pads to catch urine. Change the pads often. This helps reduce discomfort. It may also reduce the risk of skin or bladder infections.  Follow-up care  Follow up with your healthcare provider, or as directed. It may take some to find the right treatment for your problem. Your treatment plan may include special therapies or medicines. Certain procedures or surgery may also be options. Be sure to discuss any questions you have with your provider.  When to seek medical advice  Call the healthcare provider right away if any of these occur:    Fever of 100.4 F (38 C) or higher, or as directed by your provider    Bladder pain or fullness    Abdominal swelling    Nausea or vomiting    Back pain    Weakness, dizziness or fainting  Date Last Reviewed: 10/1/2017    1579-3123 [employer]. [address] [place] [address], [place] [address]. All rights reserved. This information is not intended as a  substitute for professional medical care. Always follow your healthcare professional's instructions.     Patient Education   Your Health Risk Assessment indicates you feel you are not in good emotional health.    Recreation   Recreation is not limited to sports and team events. It includes any activity that provides relaxation, interest, enjoyment, and exercise. Recreation provides an outlet for physical, mental, and social energy. It can give a sense of worth and achievement. It can help you stay healthy.    Mental Exercise and Social Involvement  Mental and emotional health is as important as physical health. Keep in touch with friends and family. Stay as active as possible. Continue to learn and challenge yourself.   Things you can do to stay mentally active are:    Learn something new, like a foreign language or musical instrument.     Play SCRABBLE or do crossword puzzles. If you cannot find people to play these games with you at home, you can play them with others on your computer through the Internet.     Join a games club--anything from card games to chess or checkers or lawn bowling.     Start a new hobby.     Go back to school.     Volunteer.     Read.     Keep up with world events.       Patient Education   Depression and Suicide in Older Adults  Nearly 2 million older Americans have some type of depression. Sadly, some of them even take their own lives. Yet depression among older adults is often ignored. Learn the warning signs. You may help spare a loved one needless pain. You may also save a life.       What Is Depression?  Depression is a mood disorder that affects the way you think and feel. The most common symptom is a feeling of deep sadness. People who are depressed also may seem tired and listless. And nothing seems to give them pleasure. Its normal to grieve or be sad sometimes. But sadness lessens or passes with time. Depression rarely goes away or improves on its own. Other symptoms of  depression are:    Sleeping more or less than normal    Eating more or less than normal    Having headaches, stomachaches, or other pains that dont go away    Feeling nervous, empty, or worthless    Crying a great deal    Thinking or talking about suicide or death    Feeling confused or forgetful  What Causes It?  The causes of depression arent fully known. Certain chemicals in the brain play a role. Depression does run in families. And life stresses can also trigger depression in some people. That may be the case with older adults. They often face great burdens, such as the death of friends or a spouse. They may have failing health. And they are more likely to be alone, lonely, or poor.  How You Can Help  Often, depressed people may not want to ask for help. When they do, they may be ignored. Or, they may receive the wrong treatment. You can help by showing parents and older friends love and support. If they seem depressed, help them find the right treatment. Talk to your doctor. Or contact a local mental health center, social service agency, or hospital. With modern treatment, no one has to suffer from depression.  Resources:    National [place] of Mental Health  828.230.4104  www.nimh.nih.gov    National [place] on Mental Illness  691.197.7973  www.barbara.org    Mental [facility]  138.528.7351  www.[facility].org    National Suicide Hotline  930.504.1890 (800-SUICIDE)      1316-6481 [employer]. [address] [place] [address]. All rights reserved. This information is not intended as a substitute for professional medical care. Always follow your healthcare professional's instructions.         Patient Education   Preventing Falls in the Home  As you get older, falls are more likely. Thats because your reaction time slows. Your muscles and joints may also get stiffer, making them less flexible. Illness, medications, and vision changes can also affect your balance. A fall could leave you  unable to live on your own. To make your home safer, follow these tips:    Floors    Put nonskid pads under area rugs.    Remove throw rugs.    Replace worn floor coverings.    Tack carpets firmly to each step on carpeted stairs. Put nonskid strips on the edges of uncarpeted stairs.    Keep floors and stairs free of clutter and cords.    Arrange furniture so there are clear pathways.    Clean up any spills right away.    Bathrooms    Install grab bars in the tub or shower.    Apply nonskid strips or put a nonskid rubber mat in the tub or shower.    Sit on a bath chair to bathe.    Use bathmats with nonskid backing.    Lighting    Keep a flashlight in each room.    Put a nightlight along the pathway between the bedroom and the bathroom.    1693-5888 [employer]. [address] [place] [address]. All rights reserved. This information is not intended as a substitute for professional medical care. Always follow your healthcare professional's instructions.         Patient Education   Understanding Advance Care Planning  Advance care planning is the process of deciding ones own future medical care. It helps ensure that if you cant speak for yourself, your wishes can still be carried out. The plan is a series of legal documents that note a persons wishes. The documents vary by state. Advance care planning may be done when a person has a serious illness that is expected to get worse. It may be done before major surgery. And it can help you and your family be prepared in case of a major illness or injury. Advance care planning helps with making decisions at these times.       A health care proxy is a person who acts as the voice of a patient when the patient cant speak for himself or herself. The name of this role varies by state. It may be called a Durable Medical Power of  or Durable Power of  for Healthcare. It may be called an agent, surrogate, or advocate. Or it may be called a  representative or decision maker. It is an official duty that is identified by a legal document. The document also varies by state.    Why Is Advance Care Planning Important?  If a person communicates their healthcare wishes:    They will be given medical care that matches their values and goals.    Their family members will not be forced to make decisions in a crisis with no guidance.  Creating a Plan  Making an advance care plan is often done in 3 steps:    Thinking about ones wishes. To create an advance care plan, you should think about what kind of medical treatment you would want if you lose the ability to communicate. Are there any situations in which you would refuse or stop treatment? Are there therapies you would want or not want? And whom do you want to make decisions for you? There are many places to learn more about how to plan for your care. Ask your doctor or  for resources.    Picking a health care proxy. This means choosing a trusted person to speak for you only when you cant speak for yourself. When you cannot make medical decisions, your proxy makes sure the instructions in your advance care plan are followed. A proxy does not make decisions based on his or her own opinions. They must put aside those opinions and values if needed, and carry out your wishes.    Filling out the legal documents. There are several kinds of legal documents for advance care planning. Each one tells health care providers your wishes. The documents may vary by state. They must be signed and may need to be witnessed or notarized. You can cancel or change them whenever you wish. Depending on your state, the documents may include a Healthcare Proxy form, Living Will, Durable Medical Power of , Advance Directive, or others.  The Familys Role  The best help a family can give is to support their loved ones wishes. Open and honest communication is vital. Family should express any concerns they have about the  patients choices while the patient can still make decisions.    2217-2096 The [employer]. [address] [place] [address], [place] [address]. All rights reserved. This information is not intended as a substitute for professional medical care. Always follow your healthcare professional's instructions.         Also, [facility] offers a free, downloadable health care directive that allows you to share your treatment choices and personal preferences if you cannot communicate your wishes. It also allows you to appoint another person (called a health care agent) to make health care decisions if you are unable to do so. You can download an advance directive by going here: [email or web address]     Patient Education   Personalized Prevention Plan  You are due for the preventive services outlined below.  Your care team is available to assist you in scheduling these services.  If you have already completed any of these items, please share that information with your care team to update in your medical record.  Health Maintenance   Topic Date Due   ? ASTHMA ACTION PLAN  1965   ? Asthma Control Test  1965   ? Pneumococcal Vaccine: Pediatrics (0 to 5 Years) and At-Risk Patients (6 to 64 Years) (1 of 1 - PPSV23) 06/04/1970   ? ADVANCE CARE PLANNING  06/04/1982   ? HEPATITIS B VACCINES (1 of 3 - Risk 3-dose series) 06/04/1983   ? ZOSTER VACCINES (1 of 2) 06/04/2014   ? PAP SMEAR  10/23/2020   ? HPV TEST  10/23/2020   ? MICROALBUMIN  03/05/2021   ? A1C  04/13/2021   ? DIABETIC EYE EXAM  07/09/2021   ? BMP  01/13/2022   ? DIABETIC FOOT EXAM  01/13/2022   ? LIPID  01/13/2022   ? COLORECTAL CANCER SCREENING  01/22/2022   ? MEDICARE ANNUAL WELLNESS VISIT  01/29/2022   ? MAMMOGRAM  01/13/2023   ? TD 18+ HE  05/30/2023   ? HEPATITIS C SCREENING  Completed   ? HIV SCREENING  Completed   ? INFLUENZA VACCINE RULE BASED  Completed   ? TDAP ADULT ONE TIME DOSE  Completed

## 2021-09-29 ENCOUNTER — OFFICE VISIT (OUTPATIENT)
Dept: FAMILY MEDICINE | Facility: CLINIC | Age: 56
End: 2021-09-29
Payer: MEDICARE

## 2021-09-29 VITALS
OXYGEN SATURATION: 97 % | TEMPERATURE: 98.8 F | RESPIRATION RATE: 12 BRPM | HEART RATE: 84 BPM | DIASTOLIC BLOOD PRESSURE: 82 MMHG | SYSTOLIC BLOOD PRESSURE: 116 MMHG

## 2021-09-29 DIAGNOSIS — B02.9 HERPES ZOSTER WITHOUT COMPLICATION: Primary | ICD-10-CM

## 2021-09-29 PROBLEM — E11.9 DIABETES MELLITUS, TYPE 2 (H): Status: ACTIVE | Noted: 2021-09-29

## 2021-09-29 PROCEDURE — 99214 OFFICE O/P EST MOD 30 MIN: CPT | Performed by: PHYSICIAN ASSISTANT

## 2021-09-29 RX ORDER — VALACYCLOVIR HYDROCHLORIDE 1 G/1
1000 TABLET, FILM COATED ORAL 3 TIMES DAILY
Qty: 21 TABLET | Refills: 0 | Status: SHIPPED | OUTPATIENT
Start: 2021-09-29 | End: 2022-06-08

## 2021-09-29 NOTE — PATIENT INSTRUCTIONS
Discussed differentials and treatment options, lesions are benign and patient is reassured.  Take medication as prescribed, and recheck with PCP if not resolving as expected, sooner with Walk-In Clinic or Emergency Room if worsening.      Herpes Zoster:  This is a viral illness caused by the chicken post virus that lies dormant in the nerve root. It can be stimulated by stress. If effects one nerve pattern, so will not spread to another area or cross the midline. The secretions are contagious, but once the lesions dry, they are not contagious. It is treated with antiviral medication that is most effective when given in the first 72 hours to help dry the lesions and reduce the risk of long-term pain from the condition. Prescribed: Valtrex. Discussed pain medication options and prescribed: OTC tylenol. If requires stonger or more pain medication call primary to discuss. Patient handout on Herpes zoster provided. Follow-up with primary for persistence or worsening of symptoms.                              Patient Education     Shingles  Shingles is a viral infection caused by the same virus that causes chicken pox. Anyone who has had chicken pox may get shingles later in life. The virus stays in the body, but remains asleep (dormant). Shingles often occurs in older persons or persons with lowered immunity. But it can affect anyone at any age.  Shingles starts as a tingling patch of skin on one side of the body. Small, painful blisters may then appear. The rash rarely spreads to other parts of the body.  Exposure to shingles can't cause shingles. However, it can cause chicken pox in anyone who has not had chicken pox or has not been vaccinated. The contagious period ends when all blisters have crusted over, generally 1 to 2 weeks after the illness starts.  After the blisters heal, the affected skin may be sensitive or painful for weeks or months, gradually resolving over time. But, sometimes this can last longer and  be permanent (called postherpetic neuralgia.)  Shingles vaccines are available. Vaccination can help prevent shingles or make it less painful. It is generally recommended for adults older than 50, even if you've had singles in the past. Talk with your healthcare provider about when to get vaccinated and which vaccine is best for you.  Home care    Medicines may be prescribed to help relieve pain. Take these medicines as directed. Ask your healthcare provider or pharmacist before using over-the-counter medicines for helping treat pain and itching.    In certain cases, antiviral medicines may be prescribed to reduce pain, shorten the illness, and prevent neuralgia. Take these medicines as directed.    Compresses made from a solution of cool water mixed with cornstarch or baking soda may help relieve pain and itching.     Gently wash skin daily with soap and water to help prevent infection. Be certain to rinse off all of the soap, which can be irritating.    Trim fingernails and try not to scratch. Scratching the sores may leave scars.    Stay home from work or school until all blisters have formed a crust and you are no longer contagious.  Follow-up care  Follow up with your healthcare provider, or as directed.  When to seek medical advice    Fever of 100.4 F (38 C) or higher, or as directed by your healthcare provider    Affected skin is on the face or neck and any of the following occur:  ? Headache  ? Eye pain  ? Changes in vision  ? Sores near the eye  ? Weakness of facial muscles    Blisters occurring on new areas of the body    Pain, redness, or swelling of a joint    Signs of skin infection: colored drainage from the sores, warmth, increasing redness, fever, or increasing pain  [employer] last reviewed this educational content on 4/1/2018 2000-2021 [employer]. All rights reserved. This information is not intended as a substitute for professional medical care. Always follow your healthcare  professional's instructions.           Patient Education     Shingles (Herpes Zoster)     Talk to your healthcare provider about the shingles vaccine.   Shingles is also called herpes zoster. It's a painful skin rash caused by the herpes zoster virus. This is the same virus that causes chickenpox. After a person has chickenpox, the virus stays inactive in the nerve cells. Years later, the virus can become active again and travel along the nerve to the skin. Most people have shingles only once. But it's possible to have it more than once.   Who is at risk for shingles?  Anyone who has ever had chickenpox can get shingles. But your risk is greater if you:     Are age 50 or older    Have an illness that weakens your immune system, such as HIV/AIDS    Have cancer, especially Hodgkin disease or lymphoma    Take medicines that weaken your immune system  What are the symptoms of shingles?    The first sign of shingles is often pain, burning, tingling, or itching on one part of your face or body. You may also feel as if you have the flu, with fever and chills.    A red rash with small blisters appears in a few days. The rash may look as follows:   ? The blisters can occur anywhere, but they re most common on the back, chest, or belly (abdomen).  ? They usually appear on only one side of the body, spreading along the nerve pathway where the virus is reactivating.   ? The rash can also form around an eye, along one side of the face or neck, or in the mouth.  ? In a few people, often those with a weak immune system, shingles appear on more than one part of the body at once.    After a few days, the blisters become dry and form a crust. The crust falls off in days to weeks. The blisters generally don't leave scars. But they can in severe cases. Or if someone has a weak immune system.    How is shingles treated?  For most people, shingles heals on its own in a few days or weeks. But treatment is advised to help ease pain, speed  healing, and reduce the risk of complications. Antiviral medicines are most often only prescribed if you are seen by a healthcare provider within the first 72 hours of having the rash. But antiviral medicines may be prescribed even after 72 hours if someone's immune system is weak. Or if the infection is extensive, severe, or isn't going away. To reduce symptoms:     Apply ice packs or cool compresses, or soak in a cool bath. To make an ice pack, put ice cubes in a plastic bag that seals at the top. Wrap the bag in a clean, thin towel or cloth. Never put ice or an ice pack directly on the skin.    Use calamine lotion to calm itchy skin.    Ask your provider about over-the-counter pain relievers. If your pain is severe, your provider may prescribe stronger pain medicines.  What are possible complications of shingles?   Shingles often goes away with no lasting effects. But some people have complications during or after the infection comes out:     Postherpetic neuralgia. This is the most common complication. It's more likely as people age, especially after age 60. It' is nerve pain at the place where the rash used to be. It can range from mild to severe. It can last for only a few days, or for months or even years after you have had shingles. Antiviral medicines given during the first 72 hours of the rash can reduce the chance of postherpetic neuralgia. Other medicines can be prescribed to help ease the pain and improve quality of life.    Bacterial infection. Shingles blisters may get infected with bacteria. Depending on the severity of the infection, topical, oral or IV (intravenous) antibiotic medicine is used to treat the infection.    Eye problems. If you have shingles on the face, see your healthcare provider right away. Shingles can cause serious problems with vision, and even blindness.  In very rare cases, shingles can also lead to pneumonia, hearing problems, brain inflammation, or even death.    When to get  medical care  Call your healthcare provider if you have any of these:     New symptoms or symptoms that don t go away with treatment    A rash or blisters near your eye    More drainage, fever, or rash after treatment    Severe pain that doesn t go away  How can shingles be prevented?  You can only get shingles if you have had chickenpox in the past. Someone who never had chickenpox can get the virus from you. But instead of have shingles, the person may get chickenpox. Until your blisters form scabs, don't have any contact with others, especially the following:     Pregnant women who have never had chickenpox or the vaccine    Babies who were born early (premature) or who had low weight at birth    People with weak immune systems (such as people getting chemotherapy for cancer, people who have had organ transplants, or people with HIV infections), particularly if they have never had chicken pox.  The shingles vaccine  The recombinant zoster vaccine (RZV) shingles vaccine is available to help prevent shingles or make it less painful.   You should get the RZV shingles vaccine if you are healthy and age 50 or older, even if you've had shingles in the past. Two shots of the RZV vaccine are recommended. You should get the second RZV shot 2 to 6 months after the first. The vaccine makes it less likely that you will develop shingles. If you do develop shingles, your symptoms will likely be milder than if you hadn t been vaccinated. RZV is also advised even if you had the older shingles vaccine (zoster live vaccine, ZVL) in the past. That's because the RZV vaccine works better and protects you from shingles longer.   Talk with your healthcare provider about the best time to get vaccinated.   [employer] last reviewed this educational content on 6/1/2019 2000-2021 [employer]. All rights reserved. This information is not intended as a substitute for professional medical care. Always follow your healthcare  professional's instructions.

## 2021-09-29 NOTE — PROGRESS NOTES
Patient presents with:  Derm Problem: Itchy and burning rash over abdomen and back x4 days Concerned about shingles       Clinical Decision Making:  I had a conversation the patient stating that I do think she has herpes zoster.  Risks and benefits of the medication and treatment were gone over.  Expected course of resolution was also reviewed. Expected course of resolution and indication for return was gone over and questions were answered to patient/parent's satisfaction before discharge.        ICD-10-CM    1. Herpes zoster without complication  B02.9 valACYclovir (VALTREX) 1000 mg tablet       Patient Instructions        Discussed differentials and treatment options, lesions are benign and patient is reassured.  Take medication as prescribed, and recheck with PCP if not resolving as expected, sooner with Walk-In Clinic or Emergency Room if worsening.      Herpes Zoster:  This is a viral illness caused by the chicken post virus that lies dormant in the nerve root. It can be stimulated by stress. If effects one nerve pattern, so will not spread to another area or cross the midline. The secretions are contagious, but once the lesions dry, they are not contagious. It is treated with antiviral medication that is most effective when given in the first 72 hours to help dry the lesions and reduce the risk of long-term pain from the condition. Prescribed: Valtrex. Discussed pain medication options and prescribed: OTC tylenol. If requires stonger or more pain medication call primary to discuss. Patient handout on Herpes zoster provided. Follow-up with primary for persistence or worsening of symptoms.                              Patient Education     Shingles  Shingles is a viral infection caused by the same virus that causes chicken pox. Anyone who has had chicken pox may get shingles later in life. The virus stays in the body, but remains asleep (dormant). Shingles often occurs in older persons or persons with lowered  immunity. But it can affect anyone at any age.  Shingles starts as a tingling patch of skin on one side of the body. Small, painful blisters may then appear. The rash rarely spreads to other parts of the body.  Exposure to shingles can't cause shingles. However, it can cause chicken pox in anyone who has not had chicken pox or has not been vaccinated. The contagious period ends when all blisters have crusted over, generally 1 to 2 weeks after the illness starts.  After the blisters heal, the affected skin may be sensitive or painful for weeks or months, gradually resolving over time. But, sometimes this can last longer and be permanent (called postherpetic neuralgia.)  Shingles vaccines are available. Vaccination can help prevent shingles or make it less painful. It is generally recommended for adults older than 50, even if you've had singles in the past. Talk with your healthcare provider about when to get vaccinated and which vaccine is best for you.  Home care    Medicines may be prescribed to help relieve pain. Take these medicines as directed. Ask your healthcare provider or pharmacist before using over-the-counter medicines for helping treat pain and itching.    In certain cases, antiviral medicines may be prescribed to reduce pain, shorten the illness, and prevent neuralgia. Take these medicines as directed.    Compresses made from a solution of cool water mixed with cornstarch or baking soda may help relieve pain and itching.     Gently wash skin daily with soap and water to help prevent infection. Be certain to rinse off all of the soap, which can be irritating.    Trim fingernails and try not to scratch. Scratching the sores may leave scars.    Stay home from work or school until all blisters have formed a crust and you are no longer contagious.  Follow-up care  Follow up with your healthcare provider, or as directed.  When to seek medical advice    Fever of 100.4 F (38 C) or higher, or as directed by your  healthcare provider    Affected skin is on the face or neck and any of the following occur:  ? Headache  ? Eye pain  ? Changes in vision  ? Sores near the eye  ? Weakness of facial muscles    Blisters occurring on new areas of the body    Pain, redness, or swelling of a joint    Signs of skin infection: colored drainage from the sores, warmth, increasing redness, fever, or increasing pain  [employer] last reviewed this educational content on 4/1/2018 2000-2021 [employer]. All rights reserved. This information is not intended as a substitute for professional medical care. Always follow your healthcare professional's instructions.           Patient Education     Shingles (Herpes Zoster)     Talk to your healthcare provider about the shingles vaccine.   Shingles is also called herpes zoster. It's a painful skin rash caused by the herpes zoster virus. This is the same virus that causes chickenpox. After a person has chickenpox, the virus stays inactive in the nerve cells. Years later, the virus can become active again and travel along the nerve to the skin. Most people have shingles only once. But it's possible to have it more than once.   Who is at risk for shingles?  Anyone who has ever had chickenpox can get shingles. But your risk is greater if you:     Are age 50 or older    Have an illness that weakens your immune system, such as HIV/AIDS    Have cancer, especially Hodgkin disease or lymphoma    Take medicines that weaken your immune system  What are the symptoms of shingles?    The first sign of shingles is often pain, burning, tingling, or itching on one part of your face or body. You may also feel as if you have the flu, with fever and chills.    A red rash with small blisters appears in a few days. The rash may look as follows:   ? The blisters can occur anywhere, but they re most common on the back, chest, or belly (abdomen).  ? They usually appear on only one side of the body, spreading along  the nerve pathway where the virus is reactivating.   ? The rash can also form around an eye, along one side of the face or neck, or in the mouth.  ? In a few people, often those with a weak immune system, shingles appear on more than one part of the body at once.    After a few days, the blisters become dry and form a crust. The crust falls off in days to weeks. The blisters generally don't leave scars. But they can in severe cases. Or if someone has a weak immune system.    How is shingles treated?  For most people, shingles heals on its own in a few days or weeks. But treatment is advised to help ease pain, speed healing, and reduce the risk of complications. Antiviral medicines are most often only prescribed if you are seen by a healthcare provider within the first 72 hours of having the rash. But antiviral medicines may be prescribed even after 72 hours if someone's immune system is weak. Or if the infection is extensive, severe, or isn't going away. To reduce symptoms:     Apply ice packs or cool compresses, or soak in a cool bath. To make an ice pack, put ice cubes in a plastic bag that seals at the top. Wrap the bag in a clean, thin towel or cloth. Never put ice or an ice pack directly on the skin.    Use calamine lotion to calm itchy skin.    Ask your provider about over-the-counter pain relievers. If your pain is severe, your provider may prescribe stronger pain medicines.  What are possible complications of shingles?   Shingles often goes away with no lasting effects. But some people have complications during or after the infection comes out:     Postherpetic neuralgia. This is the most common complication. It's more likely as people age, especially after age 60. It' is nerve pain at the place where the rash used to be. It can range from mild to severe. It can last for only a few days, or for months or even years after you have had shingles. Antiviral medicines given during the first 72 hours of the rash  can reduce the chance of postherpetic neuralgia. Other medicines can be prescribed to help ease the pain and improve quality of life.    Bacterial infection. Shingles blisters may get infected with bacteria. Depending on the severity of the infection, topical, oral or IV (intravenous) antibiotic medicine is used to treat the infection.    Eye problems. If you have shingles on the face, see your healthcare provider right away. Shingles can cause serious problems with vision, and even blindness.  In very rare cases, shingles can also lead to pneumonia, hearing problems, brain inflammation, or even death.    When to get medical care  Call your healthcare provider if you have any of these:     New symptoms or symptoms that don t go away with treatment    A rash or blisters near your eye    More drainage, fever, or rash after treatment    Severe pain that doesn t go away  How can shingles be prevented?  You can only get shingles if you have had chickenpox in the past. Someone who never had chickenpox can get the virus from you. But instead of have shingles, the person may get chickenpox. Until your blisters form scabs, don't have any contact with others, especially the following:     Pregnant women who have never had chickenpox or the vaccine    Babies who were born early (premature) or who had low weight at birth    People with weak immune systems (such as people getting chemotherapy for cancer, people who have had organ transplants, or people with HIV infections), particularly if they have never had chicken pox.  The shingles vaccine  The recombinant zoster vaccine (RZV) shingles vaccine is available to help prevent shingles or make it less painful.   You should get the RZV shingles vaccine if you are healthy and age 50 or older, even if you've had shingles in the past. Two shots of the RZV vaccine are recommended. You should get the second RZV shot 2 to 6 months after the first. The vaccine makes it less likely that  you will develop shingles. If you do develop shingles, your symptoms will likely be milder than if you hadn t been vaccinated. RZV is also advised even if you had the older shingles vaccine (zoster live vaccine, ZVL) in the past. That's because the RZV vaccine works better and protects you from shingles longer.   Talk with your healthcare provider about the best time to get vaccinated.   [employer] last reviewed this educational content on 2019-2021 [employer]. All rights reserved. This information is not intended as a substitute for professional medical care. Always follow your healthcare professional's instructions.               HPI:  Ruiz Delacruz is a 56 year old female who presents today for a red painful pruritic rash that is in a dermatomal distribution.  It is on the left abdominal wall that radiates around to the left posterior aspect of the chest wall.  Rash has been out for 4 to 5 days.  She had a 1 week prodrome of pain and itching prior to the rash.  She has had fatigue with no headache abdominal pain vomiting diarrhea fever myalgias or arthralgias.    History obtained from chart review and the patient.    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  Vitals:    21 1437   BP: 116/82   BP Location: Right arm   Patient Position: Sitting    Cuff Size: Adult Regular   Pulse: 84   Resp: 12   Temp: 98.8  F (37.1  C)   TempSrc: Oral   SpO2: 97%     General: Patient is resting comfortably no acute distress is afebrile  HEENT: Head is normocephalic atraumatic   eyes are PERRL EOMI sclera anicteric   TMs are clear bilaterally  Throat is clear  No cervical lymphadenopathy present  LUNGS: Clear to auscultation bilaterally  HEART: Regular rate and rhythm  Skin: With a rash that is a dermatomal distribution on the left abdominal wall and on the left lateral aspect of the chest wall and left posterior chest wall.  It is a fluid-filled vesicular rash with erythematous base consistent with herpes zoster.      Physical Exam    At the end of the encounter, I discussed results, diagnosis, medications. Discussed red flags for immediate return to clinic/ER, as well as indications for follow up if no improvement. Patient understood and agreed to plan. Patient was stable for discharge.

## 2021-10-03 DIAGNOSIS — F39 MOOD DISORDER (H): Primary | ICD-10-CM

## 2021-10-03 DIAGNOSIS — G89.4 CHRONIC PAIN SYNDROME: ICD-10-CM

## 2021-10-03 RX ORDER — DULOXETIN HYDROCHLORIDE 30 MG/1
CAPSULE, DELAYED RELEASE ORAL
Qty: 30 CAPSULE | Refills: 1 | Status: SHIPPED | OUTPATIENT
Start: 2021-10-03 | End: 2022-02-03

## 2021-10-03 NOTE — TELEPHONE ENCOUNTER
"Refilled. Protocol incorrect, medication active on [facility] chart.     Last Written Prescription Date:  2/26/21  Last Fill Quantity: 30,  # refills: 2   Last office visit provider:  9/29/21     Requested Prescriptions   Pending Prescriptions Disp Refills     DULoxetine (CYMBALTA) 30 MG capsule [Pharmacy Med Name: DULOXETINE DR 30MG CAPSULES] 30 capsule      Sig: TAKE 1 CAPSULE(30 MG) BY MOUTH DAILY       Serotonin-Norepinephrine Reuptake Inhibitors  Failed - 10/3/2021  5:14 AM        Failed - Medication is active on med list        Passed - Blood pressure under 140/90 in past 12 months     BP Readings from Last 3 Encounters:   09/29/21 116/82   01/29/21 128/86   01/13/21 (!) 120/90                 Passed - Recent (12 mo) or future (30 days) visit within the authorizing provider's specialty     Patient has had an office visit with the authorizing provider or a provider within the authorizing providers department within the previous 12 mos or has a future within next 30 days. See \"Patient Info\" tab in inbasket, or \"Choose Columns\" in Meds & Orders section of the refill encounter.              Passed - Patient is age 18 or older        Passed - No active pregnancy on record        Passed - No positive pregnancy test in past 12 months             Jazmín Almodovar RN 10/03/21 2:55 PM  "

## 2021-10-04 ENCOUNTER — OFFICE VISIT (OUTPATIENT)
Dept: FAMILY MEDICINE | Facility: CLINIC | Age: 56
End: 2021-10-04
Payer: MEDICARE

## 2021-10-04 VITALS
BODY MASS INDEX: 37.04 KG/M2 | SYSTOLIC BLOOD PRESSURE: 126 MMHG | WEIGHT: 182 LBS | RESPIRATION RATE: 12 BRPM | OXYGEN SATURATION: 99 % | HEART RATE: 88 BPM | DIASTOLIC BLOOD PRESSURE: 76 MMHG

## 2021-10-04 DIAGNOSIS — B02.9 HERPES ZOSTER WITHOUT COMPLICATION: Primary | ICD-10-CM

## 2021-10-04 PROCEDURE — G0008 ADMIN INFLUENZA VIRUS VAC: HCPCS | Performed by: FAMILY MEDICINE

## 2021-10-04 PROCEDURE — 90682 RIV4 VACC RECOMBINANT DNA IM: CPT | Performed by: FAMILY MEDICINE

## 2021-10-04 PROCEDURE — 99213 OFFICE O/P EST LOW 20 MIN: CPT | Mod: 25 | Performed by: FAMILY MEDICINE

## 2021-10-04 RX ORDER — TRIAMCINOLONE ACETONIDE 1 MG/G
CREAM TOPICAL 2 TIMES DAILY
Qty: 30 G | Refills: 0 | Status: SHIPPED | OUTPATIENT
Start: 2021-10-04 | End: 2022-06-08

## 2021-10-04 RX ORDER — HYDROXYZINE PAMOATE 50 MG/1
50 CAPSULE ORAL 3 TIMES DAILY PRN
Qty: 30 CAPSULE | Refills: 0 | Status: SHIPPED | OUTPATIENT
Start: 2021-10-04 | End: 2022-06-08

## 2021-10-04 ASSESSMENT — ASTHMA QUESTIONNAIRES
QUESTION_4 LAST FOUR WEEKS HOW OFTEN HAVE YOU USED YOUR RESCUE INHALER OR NEBULIZER MEDICATION (SUCH AS ALBUTEROL): NOT AT ALL
QUESTION_2 LAST FOUR WEEKS HOW OFTEN HAVE YOU HAD SHORTNESS OF BREATH: NOT AT ALL
ACT_TOTALSCORE: 25
QUESTION_1 LAST FOUR WEEKS HOW MUCH OF THE TIME DID YOUR ASTHMA KEEP YOU FROM GETTING AS MUCH DONE AT WORK, SCHOOL OR AT HOME: NONE OF THE TIME
QUESTION_3 LAST FOUR WEEKS HOW OFTEN DID YOUR ASTHMA SYMPTOMS (WHEEZING, COUGHING, SHORTNESS OF BREATH, CHEST TIGHTNESS OR PAIN) WAKE YOU UP AT NIGHT OR EARLIER THAN USUAL IN THE MORNING: NOT AT ALL
QUESTION_5 LAST FOUR WEEKS HOW WOULD YOU RATE YOUR ASTHMA CONTROL: COMPLETELY CONTROLLED

## 2021-10-04 ASSESSMENT — PATIENT HEALTH QUESTIONNAIRE - PHQ9: SUM OF ALL RESPONSES TO PHQ QUESTIONS 1-9: 0

## 2021-10-05 ASSESSMENT — ASTHMA QUESTIONNAIRES: ACT_TOTALSCORE: 25

## 2021-10-15 NOTE — PROGRESS NOTES
"Assessment/plan  1. Herpes zoster without complication  We reviewed natural course.   We discussed supportive care.   Trial of vistaril. Cautioned over possible adverse affects.   Trial of topical steroid on closed lesions.   Follow if any changes or worsening. Otherwise to return for diabetes check.     - hydrOXYzine (VISTARIL) 50 MG capsule; Take 1 capsule (50 mg) by mouth 3 times daily as needed for itching  Dispense: 30 capsule; Refill: 0  - triamcinolone (KENALOG) 0.1 % external cream; Apply topically 2 times daily  Dispense: 30 g; Refill: 0        Subjective  [age] old female here for follow up. Was seen five days ago and diagnosed with shingles. She wants to recheck the area. Has been taking her medications as directed. The rash is very itchy and burning. She wants something to help this. Thinks her pain is controlled. She would like a cream also to soothe the area.   No fever. No nausea or vomiting.       ROS: 12 systems reviewed, all negative exceptfor what is mentioned in HPI.   Past Medical History:   Diagnosis Date     Diabetes mellitus due to underlying condition with hyperglycemia, without long-term current use of insulin (H) 2021     History of hysterectomy, supracervical 06     Pregnancy          Patient Active Problem List   Diagnosis     Obesity     Depression     Molar Pregnancy, Invasive (Non-metastatic-GTD)     Peptic ulcer     Diabetes mellitus, type 2 (H)     Past Surgical History:   Procedure Laterality Date     HYSTERECTOMY      d/t \"somethingin uterus that could become cancer\"     Family History   Problem Relation Age of Onset     Diabetes No family hx of      Cancer No family hx of      Heart Disease No family hx of      No Known Problems Mother      No Known Problems Father      No Known Problems Sister      No Known Problems Brother      No Known Problems Daughter      No Known Problems Son      Social History     Socioeconomic History     Marital status: "      Spouse name: Not on file     Number of children: Not on file     Years of education: Not on file     Highest education level: Not on file   Occupational History     Not on file   Tobacco Use     Smoking status: Never Smoker     Smokeless tobacco: Never Used   Substance and Sexual Activity     Alcohol use: No     Drug use: No     Sexual activity: Never   Other Topics Concern     Not on file   Social History Narrative     Not on file     Social Determinants of Health     Financial Resource Strain:      Difficulty of Paying Living Expenses:    Food Insecurity:      Worried About Running Out of Food in the Last Year:      Ran Out of Food in the Last Year:    Transportation Needs:      Lack of Transportation (Medical):      Lack of Transportation (Non-Medical):    Physical Activity:      Days of Exercise per Week:      Minutes of Exercise per Session:    Stress:      Feeling of Stress :    Social Connections:      Frequency of Communication with Friends and Family:      Frequency of Social Gatherings with Friends and Family:      Attends Spiritism Services:      Active Member of Clubs or Organizations:      Attends Club or Organization Meetings:      Marital Status:    Intimate Partner Violence:      Fear of Current or Ex-Partner:      Emotionally Abused:      Physically Abused:      Sexually Abused:      Current Outpatient Medications   Medication     albuterol (PROAIR HFA, PROVENTIL HFA, VENTOLIN HFA) 108 (90 BASE) MCG/ACT inhaler     DULoxetine (CYMBALTA) 30 MG capsule     hydrOXYzine (VISTARIL) 50 MG capsule     triamcinolone (KENALOG) 0.1 % external cream     valACYclovir (VALTREX) 1000 mg tablet     vitamin D (ERGOCALCIFEROL) 67114 UNIT capsule     No current facility-administered medications for this visit.     Objective  /76 (BP Location: Left arm, Patient Position: Sitting, Cuff Size: Adult Regular)   Pulse 88   Resp 12   Wt 82.6 kg (182 lb)   SpO2 99%   BMI 37.04 kg/m      General  Appearance:  Alert, cooperative, no distress, appears stated age   Head:  Normocephalic, without obvious abnormality, atraumatic   Eyes:  PERRL, conjunctiva/corneas clear, EOM's intact   Throat: Lips, mucosa, and tongue normal   Neck: Supple   Lungs:   Clear to auscultation bilaterally, respirations unlabored   Heart:  Regular rate and rhythm, S1 and S2 normal, no murmur, rub, or gallop   Skin: Multiple vesicles linear along left mid abdominal wall, left mid back, scabbing noted, no drainage or warmth   Lymph nodes: Cervical, supraclavicular nodes normal   Neurologic: Normal, CN 2-12 intact

## 2022-02-27 DIAGNOSIS — E11.65 TYPE 2 DIABETES MELLITUS WITH HYPERGLYCEMIA, WITHOUT LONG-TERM CURRENT USE OF INSULIN (H): Primary | ICD-10-CM

## 2022-03-01 DIAGNOSIS — E11.65 TYPE 2 DIABETES MELLITUS WITH HYPERGLYCEMIA, WITHOUT LONG-TERM CURRENT USE OF INSULIN (H): ICD-10-CM

## 2022-03-01 NOTE — TELEPHONE ENCOUNTER
Outpatient Medication Detail     Disp Refills Start End LOUOLU   metFORMIN (GLUCOPHAGE) 500 MG tablet 180 tablet 3 2/26/2021  No   Sig: TAKE 1 TABLET(500 MG) BY MOUTH TWICE DAILY WITH MEALS   Sent to pharmacy as: metFORMIN 500 mg tablet (GLUCOPHAGE)   Notes to Pharmacy: **Patient requests 90 days supply**   E-Prescribing Status: Receipt confirmed by pharmacy (2/26/2021  8:35 PM CST)       metFORMIN (GLUCOPHAGE) 500 MG tablet [[id number]]    Electronically signed by: Jenny Lua RN on 02/26/21 2035 Status: Active   Ordering user: Jenny Lua RN 02/26/21 2035 Ordering provider: Ray Mcgarry MD   Authorized by: Ray Mcgarry MD   Frequency:  02/26/21 - Until Discontinued Released by: Jenny Lua RN 02/26/21 2035   Diagnoses  Type 2 diabetes mellitus with hyperglycemia, without long-term current use of insulin (H) [E11.65]   Medication comments: **Patient requests 90 days supply**     Routing refill request to provider for review/approval because:  Labs not current:  Multiple    Last office visit provider:  10/4/21     Requested Prescriptions   Pending Prescriptions Disp Refills     metFORMIN (GLUCOPHAGE) 500 MG tablet [Pharmacy Med Name: METFORMIN 500MG TABLETS] 60 tablet      Sig: TAKE 1 TABLET(500 MG) BY MOUTH TWICE DAILY WITH MEALS       Biguanide Agents Failed - 3/1/2022 12:31 PM        Failed - Patient has documented A1c within the specified period of time.     If HgbA1C is 8 or greater, it needs to be on file within the past 3 months.  If less than 8, must be on file within the past 6 months.     Recent Labs   Lab Test 01/13/21  1147   A1C 7.8*             Failed - Patient's CR is NOT>1.4 OR Patient's EGFR is NOT<45 within past 12 mos.     Recent Labs   Lab Test 01/13/21  1147   GFRESTIMATED >60   GFRESTBLACK >60       Recent Labs   Lab Test 01/13/21  1147   CR 0.87             Failed - Medication is active on med list        Passed - Patient is age 10 or older        Passed - Patient does  "NOT have a diagnosis of CHF.        Passed - Patient is not pregnant        Passed - Patient has not had a positive pregnancy test within the past 12 mos.         Passed - Recent (6 mo) or future (30 days) visit within the authorizing provider's specialty     Patient had office visit in the last 6 months or has a visit in the next 30 days with authorizing provider or within the authorizing provider's specialty.  See \"Patient Info\" tab in inbasket, or \"Choose Columns\" in Meds & Orders section of the refill encounter.                 Benny Ward RN 03/01/22 12:31 PM  "

## 2022-03-03 NOTE — TELEPHONE ENCOUNTER
Denied Rx, request already responded to by other means.     Malissa Wiseman RN, BSN Nurse Triage Advisor 2:10 PM 3/3/2022

## 2022-03-22 ENCOUNTER — MEDICAL CORRESPONDENCE (OUTPATIENT)
Dept: HEALTH INFORMATION MANAGEMENT | Facility: CLINIC | Age: 57
End: 2022-03-22
Payer: MEDICARE

## 2022-03-23 PROBLEM — R32 URINARY INCONTINENCE: Status: ACTIVE | Noted: 2022-03-23

## 2022-03-23 PROBLEM — G89.29 OTHER CHRONIC PAIN: Status: ACTIVE | Noted: 2022-03-23

## 2022-05-25 DIAGNOSIS — E11.65 TYPE 2 DIABETES MELLITUS WITH HYPERGLYCEMIA, WITHOUT LONG-TERM CURRENT USE OF INSULIN (H): Primary | ICD-10-CM

## 2022-05-26 DIAGNOSIS — E11.65 TYPE 2 DIABETES MELLITUS WITH HYPERGLYCEMIA, WITHOUT LONG-TERM CURRENT USE OF INSULIN (H): Primary | ICD-10-CM

## 2022-05-26 DIAGNOSIS — E78.00 PURE HYPERCHOLESTEROLEMIA: Primary | ICD-10-CM

## 2022-05-27 RX ORDER — ATORVASTATIN CALCIUM 10 MG/1
TABLET, FILM COATED ORAL
Qty: 90 TABLET | Refills: 3 | Status: SHIPPED | OUTPATIENT
Start: 2022-05-27 | End: 2023-04-07

## 2022-05-27 RX ORDER — LANCETS
EACH MISCELLANEOUS
Qty: 100 EACH | Refills: 3 | Status: SHIPPED | OUTPATIENT
Start: 2022-05-27

## 2022-05-27 NOTE — TELEPHONE ENCOUNTER
"Outpatient Medication Detail     Disp Refills Start End LOULOU   atorvastatin (LIPITOR) 10 MG tablet 90 tablet 2 1/27/2021  No   Sig - Route: Take 1 tablet (10 mg total) by mouth daily. - Oral   Sent to pharmacy as: atorvastatin 10 mg tablet (LIPITOR)   Notes to Pharmacy: **Patient requests 90 days supply**   E-Prescribing Status: Receipt confirmed by pharmacy (1/27/2021  7:31 AM CST)       atorvastatin (LIPITOR) 10 MG tablet [[id number]]    Electronically signed by: Ray Mcgarry MD on 01/27/21 0730 Status: Active   Ordering user: Ray Mcgarry MD 01/27/21 0730 Authorized by: Ray Mcgarry MD   Frequency: DAILY 01/27/21 - Until Discontinued Released by: Ray Mcgarry MD 01/27/21 0730   Diagnoses  Pure hypercholesterolemia [E78.00]   Medication comments: **Patient requests 90 days supply**     Routing refill request to provider for review/approval because:  A break in medication    Last office visit provider:  10/4/21     Requested Prescriptions   Pending Prescriptions Disp Refills     atorvastatin (LIPITOR) 10 MG tablet [Pharmacy Med Name: ATORVASTATIN 10MG TABLETS] 90 tablet      Sig: TAKE 1 TABLET(10 MG) BY MOUTH DAILY       Statins Protocol Failed - 5/27/2022  3:26 PM        Failed - LDL on file in past 12 months     Recent Labs   Lab Test 01/13/21  1147   *             Failed - Medication is active on med list        Passed - No abnormal creatine kinase in past 12 months     No lab results found.             Passed - Recent (12 mo) or future (30 days) visit within the authorizing provider's specialty     Patient has had an office visit with the authorizing provider or a provider within the authorizing providers department within the previous 12 mos or has a future within next 30 days. See \"Patient Info\" tab in inbasket, or \"Choose Columns\" in Meds & Orders section of the refill encounter.              Passed - Patient is age 18 or older        Passed - No active pregnancy on record        Passed - No " positive pregnancy test in past 12 months             Benny Wrad RN 05/27/22 3:26 PM

## 2022-05-27 NOTE — TELEPHONE ENCOUNTER
"Routing refill request to provider for review/approval because:  A break in medication    Last Written Prescription Date:  11/12/2020  Last Fill Quantity: 100,  # refills: 1   Last office visit provider:  10/4/2021 Dr. Mcgarry     blood glucose test strips 100 strip 1 11/12/2020  No   Sig - Route: Use 1 each As Directed as needed. Check blood sugar once daily.  Dispense brand per patient's insurance at pharmacy discretion. - Miscellaneous   Sent to pharmacy as: blood sugar diagnostic strips   E-Prescribing Status: Receipt confirmed by pharmacy (11/12/2020  4:15 PM CST         Requested Prescriptions   Pending Prescriptions Disp Refills     CONTOUR NEXT TEST test strip [Pharmacy Med Name: CONTOUR NEXT TEST STRIPS 100S] 100 strip      Sig: USE TEST BLOOD SUGAR TWICE DAILY AS DIRECTED AS NEEDED       Diabetic Supplies Protocol Failed - 5/26/2022 12:35 PM        Failed - Medication is active on med list        Passed - Patient is 18 years of age or older        Passed - Recent (6 mo) or future (30 days) visit within the authorizing provider's specialty     Patient had office visit in the last 6 months or has a visit in the next 30 days with authorizing provider.  See \"Patient Info\" tab in inbasket, or \"Choose Columns\" in Meds & Orders section of the refill encounter.                 Kylie Zuluaga RN 05/27/22 2:51 PM  "

## 2022-05-27 NOTE — TELEPHONE ENCOUNTER
"Routing refill request to provider for review/approval because:  Drug not active on patient's medication list    Last Written Prescription Date:    Last Fill Quantity: ,  # refills:    Last office visit provider:  10/4/21     Requested Prescriptions   Pending Prescriptions Disp Refills     Microlet Lancets MISC [Pharmacy Med Name: MICROLET COLORED LANCETS 100]       Sig: USE TO TEST BLOOD SUGAR ONCE DAILY       Diabetic Supplies Protocol Failed - 5/25/2022  3:38 PM        Failed - Medication is active on med list        Passed - Patient is 18 years of age or older        Passed - Recent (6 mo) or future (30 days) visit within the authorizing provider's specialty     Patient had office visit in the last 6 months or has a visit in the next 30 days with authorizing provider.  See \"Patient Info\" tab in inbasket, or \"Choose Columns\" in Meds & Orders section of the refill encounter.                 Elham Diaz RN 05/26/22 7:16 PM  "

## 2022-06-01 ENCOUNTER — MEDICAL CORRESPONDENCE (OUTPATIENT)
Dept: HEALTH INFORMATION MANAGEMENT | Facility: CLINIC | Age: 57
End: 2022-06-01

## 2022-06-08 ENCOUNTER — OFFICE VISIT (OUTPATIENT)
Dept: FAMILY MEDICINE | Facility: CLINIC | Age: 57
End: 2022-06-08
Payer: MEDICARE

## 2022-06-08 VITALS
OXYGEN SATURATION: 98 % | HEART RATE: 67 BPM | DIASTOLIC BLOOD PRESSURE: 68 MMHG | WEIGHT: 185 LBS | RESPIRATION RATE: 18 BRPM | SYSTOLIC BLOOD PRESSURE: 122 MMHG | BODY MASS INDEX: 37.65 KG/M2

## 2022-06-08 DIAGNOSIS — G89.4 CHRONIC PAIN SYNDROME: ICD-10-CM

## 2022-06-08 DIAGNOSIS — F39 MOOD DISORDER (H): ICD-10-CM

## 2022-06-08 DIAGNOSIS — E11.65 TYPE 2 DIABETES MELLITUS WITH HYPERGLYCEMIA, WITHOUT LONG-TERM CURRENT USE OF INSULIN (H): Primary | ICD-10-CM

## 2022-06-08 DIAGNOSIS — J45.20 MILD INTERMITTENT ASTHMA WITHOUT COMPLICATION: ICD-10-CM

## 2022-06-08 DIAGNOSIS — E78.00 PURE HYPERCHOLESTEROLEMIA: ICD-10-CM

## 2022-06-08 DIAGNOSIS — Z12.11 SCREEN FOR COLON CANCER: ICD-10-CM

## 2022-06-08 DIAGNOSIS — E66.01 MORBID OBESITY (H): ICD-10-CM

## 2022-06-08 LAB
ANION GAP SERPL CALCULATED.3IONS-SCNC: 9 MMOL/L (ref 5–18)
BUN SERPL-MCNC: 13 MG/DL (ref 8–22)
CALCIUM SERPL-MCNC: 9.3 MG/DL (ref 8.5–10.5)
CHLORIDE BLD-SCNC: 106 MMOL/L (ref 98–107)
CHOLEST SERPL-MCNC: 176 MG/DL
CO2 SERPL-SCNC: 26 MMOL/L (ref 22–31)
CREAT SERPL-MCNC: 0.77 MG/DL (ref 0.6–1.1)
CREAT UR-MCNC: 121 MG/DL
FASTING STATUS PATIENT QL REPORTED: YES
GFR SERPL CREATININE-BSD FRML MDRD: 89 ML/MIN/1.73M2
GLUCOSE BLD-MCNC: 143 MG/DL (ref 70–125)
HBA1C MFR BLD: 9.1 % (ref 0–5.6)
HDLC SERPL-MCNC: 57 MG/DL
LDLC SERPL CALC-MCNC: 98 MG/DL
MICROALBUMIN UR-MCNC: 0.76 MG/DL (ref 0–1.99)
MICROALBUMIN/CREAT UR: 6.3 MG/G CR
POTASSIUM BLD-SCNC: 4.1 MMOL/L (ref 3.5–5)
SODIUM SERPL-SCNC: 141 MMOL/L (ref 136–145)
TRIGL SERPL-MCNC: 103 MG/DL

## 2022-06-08 PROCEDURE — 80061 LIPID PANEL: CPT | Performed by: FAMILY MEDICINE

## 2022-06-08 PROCEDURE — 83036 HEMOGLOBIN GLYCOSYLATED A1C: CPT | Performed by: FAMILY MEDICINE

## 2022-06-08 PROCEDURE — 0054A COVID-19,PF,PFIZER (12+ YRS): CPT | Performed by: FAMILY MEDICINE

## 2022-06-08 PROCEDURE — 36415 COLL VENOUS BLD VENIPUNCTURE: CPT | Performed by: FAMILY MEDICINE

## 2022-06-08 PROCEDURE — 82043 UR ALBUMIN QUANTITATIVE: CPT | Performed by: FAMILY MEDICINE

## 2022-06-08 PROCEDURE — 80048 BASIC METABOLIC PNL TOTAL CA: CPT | Performed by: FAMILY MEDICINE

## 2022-06-08 PROCEDURE — 90471 IMMUNIZATION ADMIN: CPT | Performed by: FAMILY MEDICINE

## 2022-06-08 PROCEDURE — 99214 OFFICE O/P EST MOD 30 MIN: CPT | Mod: 25 | Performed by: FAMILY MEDICINE

## 2022-06-08 PROCEDURE — 90750 HZV VACC RECOMBINANT IM: CPT | Performed by: FAMILY MEDICINE

## 2022-06-08 PROCEDURE — 91305 COVID-19,PF,PFIZER (12+ YRS): CPT | Performed by: FAMILY MEDICINE

## 2022-06-08 RX ORDER — DULOXETIN HYDROCHLORIDE 30 MG/1
60 CAPSULE, DELAYED RELEASE ORAL DAILY
Qty: 30 CAPSULE | Refills: 11 | Status: SHIPPED | OUTPATIENT
Start: 2022-06-08 | End: 2023-12-29

## 2022-06-08 ASSESSMENT — ASTHMA QUESTIONNAIRES
QUESTION_1 LAST FOUR WEEKS HOW MUCH OF THE TIME DID YOUR ASTHMA KEEP YOU FROM GETTING AS MUCH DONE AT WORK, SCHOOL OR AT HOME: NONE OF THE TIME
QUESTION_2 LAST FOUR WEEKS HOW OFTEN HAVE YOU HAD SHORTNESS OF BREATH: ONCE OR TWICE A WEEK
ACT_TOTALSCORE: 19
ACT_TOTALSCORE: 19
QUESTION_3 LAST FOUR WEEKS HOW OFTEN DID YOUR ASTHMA SYMPTOMS (WHEEZING, COUGHING, SHORTNESS OF BREATH, CHEST TIGHTNESS OR PAIN) WAKE YOU UP AT NIGHT OR EARLIER THAN USUAL IN THE MORNING: TWO OR THREE NIGHTS A WEEK
QUESTION_5 LAST FOUR WEEKS HOW WOULD YOU RATE YOUR ASTHMA CONTROL: WELL CONTROLLED
QUESTION_4 LAST FOUR WEEKS HOW OFTEN HAVE YOU USED YOUR RESCUE INHALER OR NEBULIZER MEDICATION (SUCH AS ALBUTEROL): ONCE A WEEK OR LESS

## 2022-06-08 ASSESSMENT — PATIENT HEALTH QUESTIONNAIRE - PHQ9: SUM OF ALL RESPONSES TO PHQ QUESTIONS 1-9: 3

## 2022-06-08 NOTE — PROGRESS NOTES
Assessment & Plan     (E11.65) Type 2 diabetes mellitus with hyperglycemia, without long-term current use of insulin (H)  (primary encounter diagnosis)  Comment: A1C increased today. Patient will be contacted to set up a virtual appointment to discuss this with the patient. Will consider increasing her Metformin from 500 mg to 1000 mg.  Plan: Albumin Random Urine Quantitative with Creat         Ratio, HEMOGLOBIN A1C, OPTOMETRY REFERRAL,         BASIC METABOLIC PANEL, Lipid panel reflex to         direct LDL Fasting, blood glucose (CONTOUR NEXT        TEST) test strip       Worsening control.     (E78.00) Pure hypercholesterolemia  Comment: Lipid panel pending  Not at goal.     (J45.20) Mild intermittent asthma without complication  Comment: No medication changes today. Reviewed asthma action plan with patient.  Controlled.     (G89.4) Chronic pain syndrome  (F39) Mood disorder (H)  Comment: Reviewed duloxetine indications with patient. She has agreed to increase her dose to 2 capsules (60 mg) daily.  Plan: DULoxetine (CYMBALTA) 30 MG capsule        Uncontrolled.     (Z12.11) Screen for colon cancer  Plan: Fecal colorectal cancer screen FIT - Future         (S+30)    (E66.01) Morbid obesity              Return in about 1 week (around 6/15/2022) for Follow up.    Cherelle Eid, Student NP    I, Ray Mcgarry, was present with the NP student who participated in the service and in the documentation of the note.  I have verified the history and personally performed the physical exam and medical decision making.  I agree with the assessment and plan of care as documented in the note.      Ray Mcgarry MD  [facility]    Delvin Melchor is a 57 year old who presents for a follow up on the following health issues:    Diabetes- would like to recheck A1C. Denies polyphagia, polydipsia, or polyuria. She is also out of blood glucose strips and would like a refill. She is fasting and would like her  cholesterol checked as well.    BP- good today. Not currently on medications.    BP Readings from Last 6 Encounters:   06/08/22 122/68   10/04/21 126/76   09/29/21 116/82   01/29/21 128/86   01/13/21 (!) 120/90   09/28/20 124/84     Asthma- Patient only prescribed SRINVIAS. ACT score initially 19. Patient had reported on ACT she woke up 2-3 nights/week for asthma sx. She clarified that this does not occur regularly and has not occurred recently, only when she has upper respiratory infections. She feels her breathing is overall well controlled.    Knee pain- bilateral, chronic issue for patient. No new injuries. She was taking Cymbalta 30 mg which worked initially then stopped taking it because it stopped working. She is requesting another medication.    Depression- PHQ-9 score 3 today. She says her answers are mainly due to days of pain or related to her health. Not prescribed other medications besides Cymbalta which she discontinued.    Patient Active Problem List   Diagnosis     Morbid obesity (H)     Depression     Molar Pregnancy, Invasive (Non-metastatic-GTD)     Peptic ulcer     Diabetes mellitus, type 2 (H)     Other chronic pain     Urinary incontinence           Objective    /68 (BP Location: Right arm, Patient Position: Sitting, Cuff Size: Adult Regular)   Pulse 67   Resp 18   Wt 83.9 kg (185 lb)   SpO2 98%   BMI 37.65 kg/m    Body mass index is 37.65 kg/m .  Physical Exam   GENERAL: healthy, alert and no distress  RESP: lungs clear to auscultation - no rales, rhonchi or wheezes  CV: regular rate and rhythm, normal S1 S2, no S3 or S4, no murmur, click or rub, no peripheral edema and peripheral pulses strong  ABDOMEN: soft, nontender, no hepatosplenomegaly, no masses and bowel sounds normal  MS: no gross musculoskeletal defects noted, no edema  SKIN: no suspicious lesions or rashes  PSYCH: mentation appears normal, affect normal/bright  Diabetic foot exam: normal DP and PT pulses, no trophic changes  or ulcerative lesions, normal sensory exam and normal monofilament exam    Lab  Recent Results (from the past 240 hour(s))   Albumin Random Urine Quantitative with Creat Ratio    Collection Time: 06/08/22 12:57 PM   Result Value Ref Range    Microalbumin Urine mg/dL 0.76 0.00 - 1.99 mg/dL    Creatinine Urine mg/dL 121 mg/dL    Microalbumin Urine mg/g Cr 6.3 <=19.9 mg/g Cr   HEMOGLOBIN A1C    Collection Time: 06/08/22 12:57 PM   Result Value Ref Range    Hemoglobin A1C 9.1 (H) 0.0 - 5.6 %   BASIC METABOLIC PANEL    Collection Time: 06/08/22 12:57 PM   Result Value Ref Range    Sodium 141 136 - 145 mmol/L    Potassium 4.1 3.5 - 5.0 mmol/L    Chloride 106 98 - 107 mmol/L    Carbon Dioxide (CO2) 26 22 - 31 mmol/L    Anion Gap 9 5 - 18 mmol/L    Urea Nitrogen 13 8 - 22 mg/dL    Creatinine 0.77 0.60 - 1.10 mg/dL    Calcium 9.3 8.5 - 10.5 mg/dL    Glucose 143 (H) 70 - 125 mg/dL    GFR Estimate 89 >60 mL/min/1.73m2   Lipid panel reflex to direct LDL Fasting    Collection Time: 06/08/22 12:57 PM   Result Value Ref Range    Cholesterol 176 <=199 mg/dL    Triglycerides 103 <=149 mg/dL    Direct Measure HDL 57 >=50 mg/dL    LDL Cholesterol Calculated 98 <=129 mg/dL    Patient Fasting > 8hrs? Yes

## 2022-06-08 NOTE — LETTER
My Asthma Action Plan    Name: Ruiz Delacruz   YOB: 1965  Date: 6/8/2022   My doctor: Ray Mcgarry MD   My clinic: [facility]        My Rescue Medicine:   Albuterol inhaler (Proair/Ventolin/Proventil HFA)  2-4 puffs EVERY 4 HOURS as needed. Use a spacer if recommended by your provider.   My Asthma Severity:   Intermittent / Exercise Induced  Know your asthma triggers: upper respiratory infections             GREEN ZONE   Good Control    I feel good    No cough or wheeze    Can work, sleep and play without asthma symptoms       Take your asthma control medicine every day.     1. If exercise triggers your asthma, take your rescue medication    15 minutes before exercise or sports, and    During exercise if you have asthma symptoms  2. Spacer to use with inhaler: If you have a spacer, make sure to use it with your inhaler             YELLOW ZONE Getting Worse  I have ANY of these:    I do not feel good    Cough or wheeze    Chest feels tight    Wake up at night   1. Keep taking your Green Zone medications  2. Start taking your rescue medicine:    every 20 minutes for up to 1 hour. Then every 4 hours for 24-48 hours.  3. If you stay in the Yellow Zone for more than 12-24 hours, contact your doctor.  4. If you do not return to the Green Zone in 12-24 hours or you get worse, start taking your oral steroid medicine if prescribed by your provider.           RED ZONE Medical Alert - Get Help  I have ANY of these:    I feel awful    Medicine is not helping    Breathing getting harder    Trouble walking or talking    Nose opens wide to breathe       1. Take your rescue medicine NOW  2. If your provider has prescribed an oral steroid medicine, start taking it NOW  3. Call your doctor NOW  4. If you are still in the Red Zone after 20 minutes and you have not reached your doctor:    Take your rescue medicine again and    Call 911 or go to the emergency room right away    See your regular  doctor within 2 weeks of an Emergency Room or Urgent Care visit for follow-up treatment.          Annual Reminders:  Meet with Asthma Educator,  Flu Shot in the Fall, consider Pneumonia Vaccination for patients with asthma (aged 19 and older).    Pharmacy:    [email or web address] - [place], [place] - [address] [id number] - [place] - [address] AT [facility] [address]    Electronically signed by Ray Mcgarry MD   Date: 06/08/22                    Asthma Triggers  How To Control Things That Make Your Asthma Worse    Triggers are things that make your asthma worse.  Look at the list below to help you find your triggers and   what you can do about them. You can help prevent asthma flare-ups by staying away from your triggers.      Trigger                                                          What you can do   Cigarette Smoke  Tobacco smoke can make asthma worse. Do not allow smoking in your home, car or around you.  Be sure no one smokes at a child s day care or school.  If you smoke, ask your health care provider for ways to help you quit.  Ask family members to quit too.  Ask your health care provider for a referral to Quit Plan to help you quit smoking, or call 8-992-497-PLAN.     Colds, Flu, Bronchitis  These are common triggers of asthma. Wash your hands often.  Don t touch your eyes, nose or mouth.  Get a flu shot every year.     Dust Mites  These are tiny bugs that live in cloth or carpet. They are too small to see. Wash sheets and blankets in hot water every week.   Encase pillows and mattress in dust mite proof covers.  Avoid having carpet if you can. If you have carpet, vacuum weekly.   Use a dust mask and HEPA vacuum.   Pollen and Outdoor Mold  Some people are allergic to trees, grass, or weed pollen, or molds. Try to keep your windows closed.  Limit time out doors when pollen count is high.   Ask you health care provider about taking medicine during allergy season.      Animal Dander  Some people are allergic to skin flakes, urine or saliva from pets with fur or feathers. Keep pets with fur or feathers out of your home.    If you can t keep the pet outdoors, then keep the pet out of your bedroom.  Keep the bedroom door closed.  Keep pets off cloth furniture and away from stuffed toys.     Mice, Rats, and Cockroaches  Some people are allergic to the waste from these pests.   Cover food and garbage.  Clean up spills and food crumbs.  Store grease in the refrigerator.   Keep food out of the bedroom.   Indoor Mold  This can be a trigger if your home has high moisture. Fix leaking faucets, pipes, or other sources of water.   Clean moldy surfaces.  Dehumidify basement if it is damp and smelly.   Smoke, Strong Odors, and Sprays  These can reduce air quality. Stay away from strong odors and sprays, such as perfume, powder, hair spray, paints, smoke incense, paint, cleaning products, candles and new carpet.   Exercise or Sports  Some people with asthma have this trigger. Be active!  Ask your doctor about taking medicine before sports or exercise to prevent symptoms.    Warm up for 5-10 minutes before and after sports or exercise.     Other Triggers of Asthma  Cold air:  Cover your nose and mouth with a scarf.  Sometimes laughing or crying can be a trigger.  Some medicines and food can trigger asthma.

## 2022-06-16 ENCOUNTER — MEDICAL CORRESPONDENCE (OUTPATIENT)
Dept: HEALTH INFORMATION MANAGEMENT | Facility: CLINIC | Age: 57
End: 2022-06-16

## 2022-06-17 ENCOUNTER — TELEPHONE (OUTPATIENT)
Dept: FAMILY MEDICINE | Facility: CLINIC | Age: 57
End: 2022-06-17

## 2022-06-17 DIAGNOSIS — E11.65 TYPE 2 DIABETES MELLITUS WITH HYPERGLYCEMIA, WITHOUT LONG-TERM CURRENT USE OF INSULIN (H): Primary | ICD-10-CM

## 2022-06-17 NOTE — CONFIDENTIAL NOTE
Ray Mcgarry MD [facility] Care Team Pool  Please call. Needs virtual visit on Tuesday to discuss labs.

## 2022-06-29 DIAGNOSIS — E11.65 TYPE 2 DIABETES MELLITUS WITH HYPERGLYCEMIA, WITHOUT LONG-TERM CURRENT USE OF INSULIN (H): Primary | ICD-10-CM

## 2022-06-29 RX ORDER — METFORMIN HCL 500 MG
1000 TABLET, EXTENDED RELEASE 24 HR ORAL 2 TIMES DAILY WITH MEALS
Qty: 360 TABLET | Refills: 3 | Status: SHIPPED | OUTPATIENT
Start: 2022-06-29 | End: 2023-07-13

## 2022-06-29 NOTE — TELEPHONE ENCOUNTER
Please call. She was never seen for follow up diabetes, I wanted to tell her diabetes is much worse than before. Metformn is increased in dose, I sent a new prescription. Please take. Should follow up in three months. Should see diabetes education. Referral placed.

## 2022-07-19 ENCOUNTER — ALLIED HEALTH/NURSE VISIT (OUTPATIENT)
Dept: EDUCATION SERVICES | Facility: CLINIC | Age: 57
End: 2022-07-19
Attending: FAMILY MEDICINE
Payer: MEDICARE

## 2022-07-19 VITALS — WEIGHT: 182.5 LBS | BODY MASS INDEX: 37.14 KG/M2

## 2022-07-19 DIAGNOSIS — E11.65 TYPE 2 DIABETES MELLITUS WITH HYPERGLYCEMIA, WITHOUT LONG-TERM CURRENT USE OF INSULIN (H): ICD-10-CM

## 2022-07-19 PROCEDURE — 97802 MEDICAL NUTRITION INDIV IN: CPT | Performed by: DIETITIAN, REGISTERED

## 2022-07-19 NOTE — PATIENT INSTRUCTIONS
Test blood sugar in the morning before eating and two hours after one of your meals.  Limit rice at meals to no more than one cup.   Add more vegetables and/or meat to your meals.  Avoid all sugary drinks.  Have fruit or a small amount of chips as a snack.  Add in 10-15 minutes of activity or walking after your meals.

## 2022-07-19 NOTE — PROGRESS NOTES
"Diabetes Self-Management Education & Support    Presents for: Individual review    SUBJECTIVE/OBJECTIVE:  Presents for: Individual review  Accompanied by: Self  Diabetes education in the past 24mo: No  Focus of Visit: Monitoring, Taking Medication, Healthy Eating, Being Active  Diabetes type: Type 2  Other concerns:: Language barrier  Cultural Influences/Ethnic Background:  Not  or       Diabetes Symptoms & Complications:  Fatigue: Yes  Neuropathy: Yes (in hands)  Polydipsia: Yes  Polyphagia: Yes  Polyuria: Yes  Visual change: Yes  Slow healing wounds: Yes       Patient Problem List and Family Medical History reviewed for relevant medical history, current medical status, and diabetes risk factors.    Vitals:  Wt 82.8 kg (182 lb 8 oz)   BMI 37.14 kg/m    Estimated body mass index is 37.14 kg/m  as calculated from the following:    Height as of 1/29/21: 1.493 m (4' 10.78\").    Weight as of this encounter: 82.8 kg (182 lb 8 oz).   Last 3 BP:   BP Readings from Last 3 Encounters:   06/08/22 122/68   10/04/21 126/76   09/29/21 116/82       History   Smoking Status     Never Smoker   Smokeless Tobacco     Never Used       Labs:  Lab Results   Component Value Date    A1C 9.1 06/08/2022    A1C 5.7 01/02/2015     Lab Results   Component Value Date     06/08/2022     Lab Results   Component Value Date    LDL 98 06/08/2022     03/05/2020    .0 01/02/2015     05/30/2013     HDL Cholesterol   Date Value Ref Range Status   01/02/2015 54.0 >50.0 mg/dL Final     Direct Measure HDL   Date Value Ref Range Status   06/08/2022 57 >=50 mg/dL Final     Comment:     HDL Cholesterol Reference Range:     0-2 years:   No reference ranges established for patients under 2 years old  at [facility] for lipid analytes.    2-8 years:  Greater than 45 mg/dL     18 years and older:   Female: Greater than or equal to 50 mg/dL   Male:   Greater than or equal to 40 mg/dL   ]  GFR Estimate   Date " Value Ref Range Status   06/08/2022 89 >60 mL/min/1.73m2 Final     Comment:     Effective December 21, 2021 eGFRcr in adults is calculated using the 2021 CKD-EPI creatinine equation which includes age and gender (Deanne maloney al., NEJM, DOI: 10.1056/NJLJbe7077843)   01/13/2021 >60 >60 mL/min/1.73m2 Final     GFR Estimate If Black   Date Value Ref Range Status   01/13/2021 >60 >60 mL/min/1.73m2 Final     Lab Results   Component Value Date    CR 0.77 06/08/2022     No results found for: MICROALBUMIN    Healthy Eating:  Healthy Eating Assessed Today: Yes  Meal planning/habits: None  Meals include: Breakfast, Dinner  Breakfast: 2 cups brown or yellow rice, meat and vegetables, vegetable broth/water  Dinner: 2 cups brown or yellow rice, meat and vegetables, vegetable broth/water  Snacks: chips or fruit  Beverages: Water, Other (juice or other sweetened beverage weekly, water/vegetable broth daily)  Has patient met with a dietitian in the past?: No    Being Active:  Being Active Assessed Today: Yes  Exercise:: Yes (patient walks around [place] once weekly)  Days per week of moderate to strenuous exercise (like a brisk walk): 1  On average, minutes per day of exercise at this level: 60  How intense was your typical exercise? : Light (like stretching or slow walking)  Exercise Minutes per Week: 60    Monitoring:  Monitoring Assessed Today: Yes  Did patient bring glucose meter to appointment? : No  Blood Glucose Meter: ContourNext  Times checking blood sugar at home (number): 1  Times checking blood sugar at home (per): Day    Per recall patient reports that FBS range between 220-230 mg/dl    Taking Medications:  Diabetes Medication(s)     Biguanides       metFORMIN (GLUCOPHAGE XR) 500 MG 24 hr tablet    Take 2 tablets (1,000 mg) by mouth 2 times daily (with meals) for 90 days          Taking Medication Assessed Today: Yes  Current Treatments: Oral Medication (taken by mouth)  Diabetes medication side effects?: No    Problem  Solving:  Is the patient at risk for hypoglycemia?: No  Is the patient at risk for DKA?: No              Reducing Risks:  Diabetes Risks: Age over 45 years, Sedentary Lifestyle, Ethnicity  Has dilated eye exam at least once a year?: No (eye exam scheduled: date unknown by patient, but her daughter scheduled it for her)  Sees dentist every 6 months?: No (patient reported that she had a dental cleaning recently)    Healthy Coping:  Emotional response to diabetes: Uncertain  Stage of change: CONTEMPLATION (Considering change and yet undecided)  Patient Activation Measure Survey Score:  No flowsheet data found.    Diabetes knowledge and skills assessment:   Patient is knowledgeable in diabetes management concepts related to: all topics reviewed    Patient needs further education on the following diabetes management concepts: Healthy Eating, Being Active, Monitoring, Taking Medication, Problem Solving and Reducing Risks    Based on learning assessment above, most appropriate setting for further diabetes education would be: Individual setting.      INTERVENTIONS:    Education provided today on:  AADE Self-Care Behaviors:  Healthy Eating: portion control  Being Active: relationship to blood glucose and describe appropriate activity program  Monitoring: log and interpret results, individual blood glucose targets and frequency of monitoring  Taking Medication: side effects of prescribed medications and when to take medications  Problem Solving: high blood glucose - causes, signs/symptoms, treatment and prevention    Opportunities for ongoing education and support in diabetes-self management were discussed.    Pt verbalized understanding of concepts discussed and recommendations provided today.       Education Materials Provided:  BG Log Sheet      ASSESSMENT:  Ruiz reported s/s of hyperglycemia.  She has been checking BG at home, once daily in the morning: averaging 220-230 mg/dl. She had questions regarding her Metformin  tablets and dosage.  She is having two meals/day, plus snacks. Beverages: water/ vegetable broth with the occasional sweet beverage.  She is walking around [place] weekly, no other planned exercise. Patient has a future eye exam scheduled and she recently had a dental cleaning.         Patient's most recent   Lab Results   Component Value Date    A1C 9.1 06/08/2022    A1C 5.7 01/02/2015    is not meeting goal of <7.0    PLAN  See Patient Instructions for co-developed, patient-stated behavior change goals.  AVS printed and provided to patient today. See Follow-Up section for recommended follow-up.    1. Test blood sugar in the morning before eating and two hours after one of your meals.  1. Limit rice at meals to no more than one cup.   2. Add more vegetables and/or meat to your meals.  3. Avoid all sugary drinks.  4. Have fruit or a small amount of chips as a snack.  5. Add in 10-15 minutes of activity or walking after your meals.    Time Spent: 45 minutes  Encounter Type: Individual    Any diabetes medication dose changes were made via the CDE Protocol and Collaborative Practice Agreement with the patient's primary care provider. A copy of this encounter was shared with the provider.

## 2022-07-19 NOTE — LETTER
"    7/19/2022         RE: Ruiz Delacruz  [address] [id number]        Dear Colleague,    Thank you for referring your patient, Ruiz Delacruz, to the [facility]. Please see a copy of my visit note below.    Diabetes Self-Management Education & Support    Presents for: Individual review    SUBJECTIVE/OBJECTIVE:  Presents for: Individual review  Accompanied by: Self  Diabetes education in the past 24mo: No  Focus of Visit: Monitoring, Taking Medication, Healthy Eating, Being Active  Diabetes type: Type 2  Other concerns:: Language barrier  Cultural Influences/Ethnic Background:  Not  or       Diabetes Symptoms & Complications:  Fatigue: Yes  Neuropathy: Yes (in hands)  Polydipsia: Yes  Polyphagia: Yes  Polyuria: Yes  Visual change: Yes  Slow healing wounds: Yes       Patient Problem List and Family Medical History reviewed for relevant medical history, current medical status, and diabetes risk factors.    Vitals:  Wt 82.8 kg (182 lb 8 oz)   BMI 37.14 kg/m    Estimated body mass index is 37.14 kg/m  as calculated from the following:    Height as of 1/29/21: 1.493 m (4' 10.78\").    Weight as of this encounter: 82.8 kg (182 lb 8 oz).   Last 3 BP:   BP Readings from Last 3 Encounters:   06/08/22 122/68   10/04/21 126/76   09/29/21 116/82       History   Smoking Status     Never Smoker   Smokeless Tobacco     Never Used       Labs:  Lab Results   Component Value Date    A1C 9.1 06/08/2022    A1C 5.7 01/02/2015     Lab Results   Component Value Date     06/08/2022     Lab Results   Component Value Date    LDL 98 06/08/2022     03/05/2020    .0 01/02/2015     05/30/2013     HDL Cholesterol   Date Value Ref Range Status   01/02/2015 54.0 >50.0 mg/dL Final     Direct Measure HDL   Date Value Ref Range Status   06/08/2022 57 >=50 mg/dL Final     Comment:     HDL Cholesterol Reference Range:     0-2 years:   No reference ranges established for " patients under 2 years old  at [facility] for lipid analytes.    2-8 years:  Greater than 45 mg/dL     18 years and older:   Female: Greater than or equal to 50 mg/dL   Male:   Greater than or equal to 40 mg/dL   ]  GFR Estimate   Date Value Ref Range Status   06/08/2022 89 >60 mL/min/1.73m2 Final     Comment:     Effective December 21, 2021 eGFRcr in adults is calculated using the 2021 CKD-EPI creatinine equation which includes age and gender (Deanne et al., NE, DOI: 10.1056/OZWLtc4248149)   01/13/2021 >60 >60 mL/min/1.73m2 Final     GFR Estimate If Black   Date Value Ref Range Status   01/13/2021 >60 >60 mL/min/1.73m2 Final     Lab Results   Component Value Date    CR 0.77 06/08/2022     No results found for: MICROALBUMIN    Healthy Eating:  Healthy Eating Assessed Today: Yes  Meal planning/habits: None  Meals include: Breakfast, Dinner  Breakfast: 2 cups brown or yellow rice, meat and vegetables, vegetable broth/water  Dinner: 2 cups brown or yellow rice, meat and vegetables, vegetable broth/water  Snacks: chips or fruit  Beverages: Water, Other (juice or other sweetened beverage weekly, water/vegetable broth daily)  Has patient met with a dietitian in the past?: No    Being Active:  Being Active Assessed Today: Yes  Exercise:: Yes (patient walks around [place] once weekly)  Days per week of moderate to strenuous exercise (like a brisk walk): 1  On average, minutes per day of exercise at this level: 60  How intense was your typical exercise? : Light (like stretching or slow walking)  Exercise Minutes per Week: 60    Monitoring:  Monitoring Assessed Today: Yes  Did patient bring glucose meter to appointment? : No  Blood Glucose Meter: ContourNext  Times checking blood sugar at home (number): 1  Times checking blood sugar at home (per): Day    Per recall patient reports that FBS range between 220-230 mg/dl    Taking Medications:  Diabetes Medication(s)     Biguanides       metFORMIN (GLUCOPHAGE  XR) 500 MG 24 hr tablet    Take 2 tablets (1,000 mg) by mouth 2 times daily (with meals) for 90 days          Taking Medication Assessed Today: Yes  Current Treatments: Oral Medication (taken by mouth)  Diabetes medication side effects?: No    Problem Solving:  Is the patient at risk for hypoglycemia?: No  Is the patient at risk for DKA?: No              Reducing Risks:  Diabetes Risks: Age over 45 years, Sedentary Lifestyle, Ethnicity  Has dilated eye exam at least once a year?: No (eye exam scheduled: date unknown by patient, but her daughter scheduled it for her)  Sees dentist every 6 months?: No (patient reported that she had a dental cleaning recently)    Healthy Coping:  Emotional response to diabetes: Uncertain  Stage of change: CONTEMPLATION (Considering change and yet undecided)  Patient Activation Measure Survey Score:  No flowsheet data found.    Diabetes knowledge and skills assessment:   Patient is knowledgeable in diabetes management concepts related to: all topics reviewed    Patient needs further education on the following diabetes management concepts: Healthy Eating, Being Active, Monitoring, Taking Medication, Problem Solving and Reducing Risks    Based on learning assessment above, most appropriate setting for further diabetes education would be: Individual setting.      INTERVENTIONS:    Education provided today on:  AADE Self-Care Behaviors:  Healthy Eating: portion control  Being Active: relationship to blood glucose and describe appropriate activity program  Monitoring: log and interpret results, individual blood glucose targets and frequency of monitoring  Taking Medication: side effects of prescribed medications and when to take medications  Problem Solving: high blood glucose - causes, signs/symptoms, treatment and prevention    Opportunities for ongoing education and support in diabetes-self management were discussed.    Pt verbalized understanding of concepts discussed and recommendations  provided today.       Education Materials Provided:  BG Log Sheet      ASSESSMENT:  Ruiz reported s/s of hyperglycemia.  She has been checking BG at home, once daily in the morning: averaging 220-230 mg/dl. She had questions regarding her Metformin tablets and dosage.  She is having two meals/day, plus snacks. Beverages: water/ vegetable broth with the occasional sweet beverage.  She is walking around [place] weekly, no other planned exercise. Patient has a future eye exam scheduled and she recently had a dental cleaning.         Patient's most recent   Lab Results   Component Value Date    A1C 9.1 06/08/2022    A1C 5.7 01/02/2015    is not meeting goal of <7.0    PLAN  See Patient Instructions for co-developed, patient-stated behavior change goals.  AVS printed and provided to patient today. See Follow-Up section for recommended follow-up.    1. Test blood sugar in the morning before eating and two hours after one of your meals.  1. Limit rice at meals to no more than one cup.   2. Add more vegetables and/or meat to your meals.  3. Avoid all sugary drinks.  4. Have fruit or a small amount of chips as a snack.  5. Add in 10-15 minutes of activity or walking after your meals.    Time Spent: 45 minutes  Encounter Type: Individual    Any diabetes medication dose changes were made via the CDE Protocol and Collaborative Practice Agreement with the patient's primary care provider. A copy of this encounter was shared with the provider.

## 2022-08-09 ENCOUNTER — MEDICAL CORRESPONDENCE (OUTPATIENT)
Dept: HEALTH INFORMATION MANAGEMENT | Facility: CLINIC | Age: 57
End: 2022-08-09

## 2022-08-11 ENCOUNTER — TRANSFERRED RECORDS (OUTPATIENT)
Dept: HEALTH INFORMATION MANAGEMENT | Facility: CLINIC | Age: 57
End: 2022-08-11

## 2022-08-11 LAB — RETINOPATHY: NEGATIVE

## 2022-08-12 ENCOUNTER — TELEPHONE (OUTPATIENT)
Dept: EDUCATION SERVICES | Facility: CLINIC | Age: 57
End: 2022-08-12

## 2022-08-12 NOTE — TELEPHONE ENCOUNTER
Ruiz did not arrive for her diabetes office visit at 9am today.  I did call her through a [employer]  but she did not answer. We left a message for her.  Please reach out to Ruiz to reschedule her Diabetes appointment for a date after 9/8/22.    Thank you,  Viviana

## 2022-09-13 ENCOUNTER — ALLIED HEALTH/NURSE VISIT (OUTPATIENT)
Dept: EDUCATION SERVICES | Facility: CLINIC | Age: 57
End: 2022-09-13
Payer: MEDICARE

## 2022-09-13 VITALS — WEIGHT: 181.5 LBS | BODY MASS INDEX: 36.93 KG/M2

## 2022-09-13 DIAGNOSIS — E11.65 TYPE 2 DIABETES MELLITUS WITH HYPERGLYCEMIA, WITHOUT LONG-TERM CURRENT USE OF INSULIN (H): Primary | ICD-10-CM

## 2022-09-13 DIAGNOSIS — E11.9 DIABETES MELLITUS, TYPE 2 (H): ICD-10-CM

## 2022-09-13 DIAGNOSIS — E11.9 DIABETES MELLITUS, TYPE 2 (H): Primary | ICD-10-CM

## 2022-09-13 LAB — HBA1C MFR BLD: 8.4 % (ref 0–5.6)

## 2022-09-13 PROCEDURE — G0108 DIAB MANAGE TRN  PER INDIV: HCPCS | Performed by: DIETITIAN, REGISTERED

## 2022-09-13 PROCEDURE — 36415 COLL VENOUS BLD VENIPUNCTURE: CPT | Performed by: DIETITIAN, REGISTERED

## 2022-09-13 NOTE — PROGRESS NOTES
Noted diabetes note today. Rx sent for empagliflozin. Please let patient know. Schedule to see Dr. Mcgarry in October.    Diagnoses and all orders for this visit:    Type 2 diabetes mellitus with hyperglycemia, without long-term current use of insulin (H)  -     empagliflozin (JARDIANCE) 10 MG TABS tablet; Take 1 tablet (10 mg) by mouth daily      No future appointments.     [facility]/Nurse Visit on 09/13/2022   Component Date Value Ref Range Status     Hemoglobin A1C 09/13/2022 8.4 (A) 0.0 - 5.6 % Final    Normal <5.7%   Prediabetes 5.7-6.4%    Diabetes 6.5% or higher     Note: Adopted from ADA consensus guidelines.     Last BMI 37.   Normal renal function.    Current Outpatient Medications   Medication Sig Dispense Refill     empagliflozin (JARDIANCE) 10 MG TABS tablet Take 1 tablet (10 mg) by mouth daily 90 tablet 4     albuterol (PROAIR HFA, PROVENTIL HFA, VENTOLIN HFA) 108 (90 BASE) MCG/ACT inhaler Inhale 2 puffs into the lungs every 6 hours as needed for shortness of breath / dyspnea or wheezing 3 Inhaler 1     atorvastatin (LIPITOR) 10 MG tablet TAKE 1 TABLET(10 MG) BY MOUTH DAILY 90 tablet 3     blood glucose (CONTOUR NEXT TEST) test strip Test blood sugar once daily: in the morning before eating or two hours after one of your meals. 50 strip 11     DULoxetine (CYMBALTA) 30 MG capsule Take 2 capsules (60 mg) by mouth daily 30 capsule 11     metFORMIN (GLUCOPHAGE XR) 500 MG 24 hr tablet Take 2 tablets (1,000 mg) by mouth 2 times daily (with meals) for 90 days 360 tablet 3     Microlet Lancets MISC USE TO TEST BLOOD SUGAR ONCE DAILY 100 each 3     vitamin D (ERGOCALCIFEROL) 27844 UNIT capsule Take 1 capsule by mouth every 7 days for 8 doses. 8 capsule 0

## 2022-09-13 NOTE — PROGRESS NOTES
"Diabetes Education Follow-up/ 30 minutes through professional  # [id number]    Subjective/Objective:     Review of BG log and goals with Ruiz Delacruz. Last date of communication was: 7/19/22.    Diabetes is being managed with   Lifestyle (diet/activity), Diabetes Medications   Diabetes Medication(s)     Biguanides       metFORMIN (GLUCOPHAGE XR) 500 MG 24 hr tablet    Take 2 tablets (1,000 mg) by mouth 2 times daily (with meals) for 90 days          BG/ Log:   FBS: 125,145,153,167,134,198,165,190  PP: 201,166    Assessment:    Ruiz reports that she has decreased her rice portions at meals to one cup or yellow or brown rice.  She is limited sweet beverages to just a small amount \"not very often\", she is drinking a 1/2 to one water bottle/day.  She reported being told that having too much water was not good for her, so that is why she does not drink very much.  She is walking around [place] once weekly, and has added in walking after meals for 10-15 minutes, about 4x/week.  She had a recent eye exam and received glasses, which has taken care of her blurry vision.  She reports of no  s/s of high blood sugar.    A1C results today= 8.4%, a slight improvement since 6/2022=9.1%, but still not meeting glycemic goals.  Educator will consult with PCP regarding adding in an additional medication.       Plan/Response:  See Patient Instructions for co-developed, patient-stated behavior change goals.    1. Test blood sugar once daily: either in the morning before eating or two hours after one of your meals.  2. Limit rice at meals to no more than one cup.   3. Add more vegetables and/or meat to your meals.  4. Avoid all sugary drinks.  5. Have fruit or a small amount of chips as a snack.  6. Add in 10-15 minutes of activity or walking after your meals.         Any diabetes medication dose changes were made via the CDE Protocol and Collaborative Practice Agreement with the patient's primary care provider. A copy of this " encounter was shared with the provider.

## 2022-09-16 NOTE — PROGRESS NOTES
Sarah Morelos MD [facility] Team Pool 19 minutes ago (4:07 PM)       Please let patient know I sent a new prescription to her pharmacy - this was recommended by her diabetes educator.  It is once a day - in the morning.  Follow up with diabetes educator as planned in October.   Dr. Prater      RN attempt to call patient regarding Dr. Prater's message above.     No answer, and unable to leave voicemail. Will attempt to call patient another time.    MARGO BarraganN, RN   [facility]

## 2022-09-20 ENCOUNTER — TELEPHONE (OUTPATIENT)
Dept: EDUCATION SERVICES | Facility: CLINIC | Age: 57
End: 2022-09-20

## 2022-09-20 NOTE — TELEPHONE ENCOUNTER
Message left for Ruiz, through a professional [facility]  letting her know that Dr. Mohr ordered a new Diabetes medication(Jardiance) for her to take along with her other Diabetes medications. The prescription was sent to [facility] on [address] [facility].    Viviana Hoffman

## 2022-10-12 ENCOUNTER — OFFICE VISIT (OUTPATIENT)
Dept: FAMILY MEDICINE | Facility: CLINIC | Age: 57
End: 2022-10-12
Payer: MEDICARE

## 2022-10-12 ENCOUNTER — HOSPITAL ENCOUNTER (OUTPATIENT)
Dept: GENERAL RADIOLOGY | Facility: HOSPITAL | Age: 57
Discharge: HOME | End: 2022-10-12
Attending: FAMILY MEDICINE | Admitting: FAMILY MEDICINE
Payer: MEDICARE

## 2022-10-12 VITALS
TEMPERATURE: 98.3 F | RESPIRATION RATE: 16 BRPM | BODY MASS INDEX: 36.32 KG/M2 | SYSTOLIC BLOOD PRESSURE: 125 MMHG | WEIGHT: 178.5 LBS | DIASTOLIC BLOOD PRESSURE: 81 MMHG | HEART RATE: 72 BPM | OXYGEN SATURATION: 95 %

## 2022-10-12 DIAGNOSIS — R07.9 CHEST PAIN, UNSPECIFIED TYPE: Primary | ICD-10-CM

## 2022-10-12 DIAGNOSIS — R07.9 CHEST PAIN, UNSPECIFIED TYPE: ICD-10-CM

## 2022-10-12 PROCEDURE — 99213 OFFICE O/P EST LOW 20 MIN: CPT | Performed by: FAMILY MEDICINE

## 2022-10-12 PROCEDURE — 71046 X-RAY EXAM CHEST 2 VIEWS: CPT | Mod: FY

## 2022-10-12 RX ORDER — IBUPROFEN 600 MG/1
600 TABLET, FILM COATED ORAL EVERY 8 HOURS PRN
Qty: 21 TABLET | Refills: 0 | Status: SHIPPED | OUTPATIENT
Start: 2022-10-12

## 2022-10-12 NOTE — PROGRESS NOTES
Assessment & Plan     Chest pain, unspecified type  Right sided chest pain. Non-tender. No other symptoms. Seems musculoskeletal. Refer to primary care for further evaluation after trial of ibuprofen.  - XR Chest 2 Views neg             No follow-ups on file.    Jeffrey Pimentel MD  [facility] WILMAN Melchor is a 57 year old female who presents to clinic today for the following health issues:  Chief Complaint   Patient presents with     Chest Pain     2 week Right side Chest pain no sob     HPI    1-2 weeks ago and chest pain on right side.  Has not gone away.  When turns to left it improves.  Back on right side.    No SOB or fever or cough.    Nothing makes it worse.  Nothing makes it better if turns to left.  No medicine taken.        Review of Systems        Objective    /81 (BP Location: Right arm, Patient Position: Sitting, Cuff Size: Adult Regular)   Pulse 72   Temp 98.3  F (36.8  C) (Oral)   Resp 16   Wt 81 kg (178 lb 8 oz)   SpO2 95%   BMI 36.32 kg/m    Physical Exam  Vitals and nursing note reviewed.   Constitutional:       Appearance: Normal appearance.   HENT:      Right Ear: Tympanic membrane normal.      Left Ear: Tympanic membrane normal.      Mouth/Throat:      Mouth: Mucous membranes are moist.   Eyes:      Pupils: Pupils are equal, round, and reactive to light.   Cardiovascular:      Rate and Rhythm: Normal rate and regular rhythm.      Pulses: Normal pulses.      Heart sounds: Normal heart sounds.   Pulmonary:      Effort: Pulmonary effort is normal.      Breath sounds: Normal breath sounds.   Chest:      Chest wall: No tenderness.   Musculoskeletal:      Cervical back: Neck supple.   Neurological:      Mental Status: She is alert.            CXR - Reviewed and interpreted by me Normal- no infiltrates, effusions, pneumothoraces, cardiomegaly or masses

## 2022-10-12 NOTE — PATIENT INSTRUCTIONS
I want you to take some ibuprofen for 5-7 days.  Ibuprofen 600mg 3x/day.    Follow up with your doctor next week to discuss further unless goes away.

## 2022-11-15 ENCOUNTER — ALLIED HEALTH/NURSE VISIT (OUTPATIENT)
Dept: EDUCATION SERVICES | Facility: CLINIC | Age: 57
End: 2022-11-15
Payer: MEDICARE

## 2022-11-15 VITALS — BODY MASS INDEX: 37.44 KG/M2 | WEIGHT: 184 LBS

## 2022-11-15 DIAGNOSIS — E11.9 DIABETES MELLITUS, TYPE 2 (H): Primary | ICD-10-CM

## 2022-11-15 PROCEDURE — G0108 DIAB MANAGE TRN  PER INDIV: HCPCS | Performed by: DIETITIAN, REGISTERED

## 2022-11-15 NOTE — LETTER
11/15/2022         RE: Ruiz Delacruz  [address] [id number]        Dear Colleague,    Thank you for referring your patient, Ruiz Delacruz, to the [facility]. Please see a copy of my visit note below.    Diabetes Self-Management Education & Support/ 35 minutes through professional     Presents for: Follow-up    Type of Service: In Person Visit    Assessment Type:   ASSESSMENT:  Follow up visit for Ruiz, last DM education was 9/13/22. Patient reported that she did  a new medication for her Diabetes after our last visit and she has been taking one tablet daily. She is checking her BG 4-5x/week: mainly in the morning. She has no s/s of hyperglycemia except for more frequent urination.  She is no longer exercising since the weather has changed. Her weight has increased 6# since October.  She is working on portion control for her rice, but she said it was difficult.  She is having a sweetened beverage on occasion. She is drinking 1-2 water bottles/day.     Patient's most recent   Lab Results   Component Value Date    A1C 8.4 09/13/2022    A1C 5.7 01/02/2015     is not meeting goal of <8.0    Diabetes knowledge and skills assessment:   Patient is knowledgeable in diabetes management concepts related to: Taking Medication    Continue education with the following diabetes management concepts: Healthy Eating, Being Active, Monitoring and Reducing Risks    Based on learning assessment above, most appropriate setting for further diabetes education would be: Individual setting.      PLAN  1. Test blood sugar once daily: fasting or two hours after a meal.  2. Take medications as prescribed.  3. Add in 10-15 minutes of walking after meals.  4. Avoid all sugary beverages.  5. Limit rice to one small bowl at meals: add more vegetables and protein.  6. Limit snacks to one serving of fruit or a small bowl of chips.     Topics to cover at upcoming visits: Healthy Eating,  "Being Active, Monitoring and Reducing Risks    Follow-up: 12/16/22    See Care Plan for co-developed, patient-state behavior change goals.  AVS provided for patient today.    Education Materials Provided:  No new materials provided today      SUBJECTIVE/OBJECTIVE:  Presents for: Follow-up  Accompanied by: Self  Diabetes education in the past 24mo: Yes  Focus of Visit: Monitoring, Taking Medication, Healthy Eating, Being Active  Diabetes type: Type 2  Other concerns:: Language barrier  Cultural Influences/Ethnic Background:  Not  or       Diabetes Symptoms & Complications:  Fatigue: No  Neuropathy: Yes (tingling in hands)  Polydipsia: No  Polyphagia: No  Polyuria: Yes  Visual change: No  Slow healing wounds: No  Weight trend: Increasing       Patient Problem List and Family Medical History reviewed for relevant medical history, current medical status, and diabetes risk factors.    Vitals:  Wt 83.5 kg (184 lb)   BMI 37.44 kg/m    Estimated body mass index is 37.44 kg/m  as calculated from the following:    Height as of 1/29/21: 1.493 m (4' 10.78\").    Weight as of this encounter: 83.5 kg (184 lb).   Last 3 BP:   BP Readings from Last 3 Encounters:   10/12/22 125/81   06/08/22 122/68   10/04/21 126/76       History   Smoking Status     Never   Smokeless Tobacco     Never       Labs:  Lab Results   Component Value Date    A1C 8.4 09/13/2022    A1C 5.7 01/02/2015     Lab Results   Component Value Date     06/08/2022     Lab Results   Component Value Date    LDL 98 06/08/2022     03/05/2020    .0 01/02/2015     05/30/2013     HDL Cholesterol   Date Value Ref Range Status   01/02/2015 54.0 >50.0 mg/dL Final     Direct Measure HDL   Date Value Ref Range Status   06/08/2022 57 >=50 mg/dL Final     Comment:     HDL Cholesterol Reference Range:     0-2 years:   No reference ranges established for patients under 2 years old  at [facility] for lipid analytes.    2-8 " years:  Greater than 45 mg/dL     18 years and older:   Female: Greater than or equal to 50 mg/dL   Male:   Greater than or equal to 40 mg/dL   ]  GFR Estimate   Date Value Ref Range Status   06/08/2022 89 >60 mL/min/1.73m2 Final     Comment:     Effective December 21, 2021 eGFRcr in adults is calculated using the 2021 CKD-EPI creatinine equation which includes age and gender (Deanne maloney al., [place], DOI: 10.[facility]6/[id number])   01/13/2021 >60 >60 mL/min/1.73m2 Final     GFR Estimate If Black   Date Value Ref Range Status   01/13/2021 >60 >60 mL/min/1.73m2 Final     Lab Results   Component Value Date    CR 0.77 06/08/2022     No results found for: MICROALBUMIN    Healthy Eating:  Healthy Eating Assessed Today: Yes  Meal planning/habits: Smaller portions  Meals include: Breakfast, Dinner  Breakfast: brown rice or yellow rice, one cup or a bit more, vegetables, fish or meat  Dinner: brown rice or yellow rice, one cup or a bit more, vegetables, fish or meat  Snacks: fruit or chips  Beverages: Water (1-2 bottles/water/day, occasionally a sweet beverage)  Has patient met with a dietitian in the past?: Yes    Being Active:  Being Active Assessed Today: Yes  Exercise:: Currently not exercising  Barrier to exercise: Other (weather)    Monitoring:  Monitoring Assessed Today: Yes  Did patient bring glucose meter to appointment? : Yes  Blood Glucose Meter: Accu-chek  Times checking blood sugar at home (number): 1  Times checking blood sugar at home (per): Day  Blood glucose trend: Fluctuating    Blood glucose log:  FBS: 145,155,149,279,277,119,112,220  PP( one hour): 360,262,267,182    Taking Medications:  Diabetes Medication(s)     Biguanides       metFORMIN (GLUCOPHAGE XR) 500 MG 24 hr tablet    Take 2 tablets (1,000 mg) by mouth 2 times daily (with meals) for 90 days    Sodium-Glucose Co-Transporter 2 (SGLT2) Inhibitors       canagliflozin (INVOKANA) 100 MG tablet    Take 1 tablet (100 mg) by mouth every morning (before  breakfast)     empagliflozin (JARDIANCE) 10 MG TABS tablet    Take 1 tablet (10 mg) by mouth daily          Taking Medication Assessed Today: Yes  Current Treatments: Diet, Oral Medication (taken by mouth)  Problems taking diabetes medications regularly?: No    Problem Solving:                 Reducing Risks:  Diabetes Risks: Age over 45 years, Sedentary Lifestyle, Ethnicity  Has dilated eye exam at least once a year?: Yes    Healthy Coping:  Emotional response to diabetes: Ready to learn  Stage of change: PREPARATION (Decided to change - considering how)  Patient Activation Measure Survey Score:  No flowsheet data found.      Care Plan and Education Provided:  Care Plan: Diabetes   Updates made by Viviana Hoffman RD since 11/15/2022 12:00 AM      Problem: HbA1C Not In Goal       Goal: Establish Regular Follow-Ups with PCP       Task: Discuss with PCP the recommended timing for patient's next follow up visit(s)    Responsible User: Viviana Hoffman RD      Task: Discuss schedule for PCP visits with patient    Responsible User: Viviana Hoffman RD      Goal: Get HbA1C Level in Goal       Task: Educate patient on diabetes education self-management topics    Responsible User: Viviana Hoffman RD      Task: Educate patient on benefits of regular glucose monitoring    Responsible User: Viviana Hoffman RD      Task: Refer patient to appropriate extended care team member, as needed (Medication Therapy Management, Behavioral Health, Physical Therapy, etc.)    Responsible User: Viviana Hoffman RD      Task: Discuss diabetes treatment plan with patient    Responsible User: Viviana Hoffman RD      Problem: Diabetes Self-Management Education Needed to Optimize Self-Care Behaviors       Goal: Understand diabetes pathophysiology and disease progression       Task: Provide education on diabetes pathophysiology and disease progression specfic to patient's diabetes type    Responsible User: Viviana Hoffman RD      Goal: Healthy Eating  - follow a healthy eating pattern for diabetes       Task: Provide education on portion control and consistency in amount, composition and timing of food intake Completed 11/15/2022   Responsible User: Viviana Hoffman RD      Task: Provide education on managing carbohydrate intake (carbohydrate counting, plate planning method, etc.) Completed 11/15/2022   Responsible User: Viviana Hoffman RD      Task: Provide education on weight management Completed 11/15/2022   Responsible User: Viviana Hoffman RD      Task: Provide education on heart healthy eating    Responsible User: Viviana Hoffman RD      Task: Provide education on eating out    Responsible User: Viviana Hoffman RD      Task: Develop individualized healthy eating plan with patient    Responsible User: Viviana Hoffman RD      Goal: Being Active - get regular physical activity, working up to at least 150 minutes per week       Task: Provide education on relationship of activity to glucose and precautions to take if at risk for low glucose Completed 11/15/2022   Responsible User: Viviana Hoffman RD      Task: Discuss barriers to physical activity with patient    Responsible User: Viviana Hoffman RD      Task: Develop physical activity plan with patient    Responsible User: Viviana Hoffman RD      Task: Explore community resources including walking groups, assistance programs, and home videos    Responsible User: Viviana Hoffman RD      Goal: Monitoring - monitor glucose and ketones as directed    This Visit's Progress: 50%   Note:    Check blood sugar once daily: fasting or two hours after a meal.      Task: Provide education on blood glucose monitoring (purpose, proper technique, frequency, glucose targets, interpreting results, when to use glucose control solution, sharps disposal)    Responsible User: Viviana Hoffman RD      Task: Provide education on continuous glucose monitoring (sensor placement, use of mariann or /reader, understanding glucose trends,  alerts and alarms, differences between sensor glucose and blood glucose)    Responsible User: Viviana Hoffman RD      Task: Provide education on ketone monitoring (when to monitor, frequency, etc.)    Responsible User: Viviana Hoffman RD      Goal: Taking Medication - patient is consistently taking medications as directed       Task: Provide education on action of prescribed medication, including when to take and possible side effects    Responsible User: Viviana Hoffman RD      Task: Provide education on insulin and injectable diabetes medications, including administration, storage, site selection and rotation for injection sites    Responsible User: Viviana Hoffman RD      Task: Discuss barriers to medication adherence with patient and provide management technique ideas as appropriate    Responsible User: Viviana Hoffman RD      Task: Provide education on frequency and refill details of medications    Responsible User: Viviana Hoffman RD      Goal: Problem Solving - know how to prevent and manage short-term diabetes complications       Task: Provide education on high blood glucose - causes, signs/symptoms, prevention and treatment    Responsible User: Viviana Hoffman RD      Task: Provide education on low blood glucose - causes, signs/symptoms, prevention, treatment, carrying a carbohydrate source at all times, and medical identification    Responsible User: Viviana Hoffman RD      Task: Provide education on safe travel with diabetes    Responsible User: Viviana Hoffman RD      Task: Provide education on how to care for diabetes on sick days    Responsible User: Viviana Hoffman RD      Task: Provide education on when to call a health care provider    Responsible User: Viviana Hoffman RD      Goal: Reducing Risks - know how to prevent and treat long-term diabetes complications       Task: Provide education on major complications of diabetes, prevention, early diagnostic measures and treatment of complications     Responsible User: Viviana Hoffman RD      Task: Provide education on recommended care for dental, eye and foot health    Responsible User: Viviana Hoffman RD      Task: Provide education on Hemoglobin A1c - goals and relationship to blood glucose levels    Responsible User: Viviana Hoffman RD      Task: Provide education on recommendations for heart health - lipid levels and goals, blood pressure and goals, and aspirin therapy, if indicated    Responsible User: Viviana Hoffman RD      Task: Provide education on tobacco cessation    Responsible User: Viviana Hoffman RD      Goal: Healthy Coping - use available resources to cope with the challenges of managing diabetes       Task: Discuss recognizing feelings about having diabetes    Responsible User: Viviana Hoffman RD      Task: Provide education on the benefits of making appropriate lifestyle changes    Responsible User: Viviana Hoffman RD      Task: Provide education on benefits of utilizing support systems    Responsible User: Viviana Hoffman RD      Task: Discuss methods for coping with stress    Responsible User: Viviana Hoffman RD      Task: Provide education on when to seek professional counseling    Responsible User: Viviana Hoffman RD              Time Spent: 30 minutes  Encounter Type: Individual    Any diabetes medication dose changes were made via the CDE Protocol per the patient's primary care provider. A copy of this encounter was shared with the provider.

## 2022-11-15 NOTE — PROGRESS NOTES
Diabetes Self-Management Education & Support/ 35 minutes through professional     Presents for: Follow-up    Type of Service: In Person Visit    Assessment Type:   ASSESSMENT:  Follow up visit for Ruiz, last DM education was 9/13/22. Patient reported that she did  a new medication for her Diabetes after our last visit and she has been taking one tablet daily. She is checking her BG 4-5x/week: mainly in the morning. She has no s/s of hyperglycemia except for more frequent urination.  She is no longer exercising since the weather has changed. Her weight has increased 6# since October.  She is working on portion control for her rice, but she said it was difficult.  She is having a sweetened beverage on occasion. She is drinking 1-2 water bottles/day.     Patient's most recent   Lab Results   Component Value Date    A1C 8.4 09/13/2022    A1C 5.7 01/02/2015     is not meeting goal of <8.0    Diabetes knowledge and skills assessment:   Patient is knowledgeable in diabetes management concepts related to: Taking Medication    Continue education with the following diabetes management concepts: Healthy Eating, Being Active, Monitoring and Reducing Risks    Based on learning assessment above, most appropriate setting for further diabetes education would be: Individual setting.      PLAN  1. Test blood sugar once daily: fasting or two hours after a meal.  2. Take medications as prescribed.  3. Add in 10-15 minutes of walking after meals.  4. Avoid all sugary beverages.  5. Limit rice to one small bowl at meals: add more vegetables and protein.  6. Limit snacks to one serving of fruit or a small bowl of chips.     Topics to cover at upcoming visits: Healthy Eating, Being Active, Monitoring and Reducing Risks    Follow-up: 12/16/22    See Care Plan for co-developed, patient-state behavior change goals.  AVS provided for patient today.    Education Materials Provided:  No new materials provided  "today      SUBJECTIVE/OBJECTIVE:  Presents for: Follow-up  Accompanied by: Self  Diabetes education in the past 24mo: Yes  Focus of Visit: Monitoring, Taking Medication, Healthy Eating, Being Active  Diabetes type: Type 2  Other concerns:: Language barrier  Cultural Influences/Ethnic Background:  Not  or       Diabetes Symptoms & Complications:  Fatigue: No  Neuropathy: Yes (tingling in hands)  Polydipsia: No  Polyphagia: No  Polyuria: Yes  Visual change: No  Slow healing wounds: No  Weight trend: Increasing       Patient Problem List and Family Medical History reviewed for relevant medical history, current medical status, and diabetes risk factors.    Vitals:  Wt 83.5 kg (184 lb)   BMI 37.44 kg/m    Estimated body mass index is 37.44 kg/m  as calculated from the following:    Height as of 1/29/21: 1.493 m (4' 10.78\").    Weight as of this encounter: 83.5 kg (184 lb).   Last 3 BP:   BP Readings from Last 3 Encounters:   10/12/22 125/81   06/08/22 122/68   10/04/21 126/76       History   Smoking Status     Never   Smokeless Tobacco     Never       Labs:  Lab Results   Component Value Date    A1C 8.4 09/13/2022    A1C 5.7 01/02/2015     Lab Results   Component Value Date     06/08/2022     Lab Results   Component Value Date    LDL 98 06/08/2022     03/05/2020    .0 01/02/2015     05/30/2013     HDL Cholesterol   Date Value Ref Range Status   01/02/2015 54.0 >50.0 mg/dL Final     Direct Measure HDL   Date Value Ref Range Status   06/08/2022 57 >=50 mg/dL Final     Comment:     HDL Cholesterol Reference Range:     0-2 years:   No reference ranges established for patients under 2 years old  at [facility] for lipid analytes.    2-8 years:  Greater than 45 mg/dL     18 years and older:   Female: Greater than or equal to 50 mg/dL   Male:   Greater than or equal to 40 mg/dL   ]  GFR Estimate   Date Value Ref Range Status   06/08/2022 89 >60 mL/min/1.73m2 Final     " Comment:     Effective December 21, 2021 eGFRcr in adults is calculated using the 2021 CKD-EPI creatinine equation which includes age and gender (Deanne maloney al., NEJ, DOI: 10.1056/CIBWky3333243)   01/13/2021 >60 >60 mL/min/1.73m2 Final     GFR Estimate If Black   Date Value Ref Range Status   01/13/2021 >60 >60 mL/min/1.73m2 Final     Lab Results   Component Value Date    CR 0.77 06/08/2022     No results found for: MICROALBUMIN    Healthy Eating:  Healthy Eating Assessed Today: Yes  Meal planning/habits: Smaller portions  Meals include: Breakfast, Dinner  Breakfast: brown rice or yellow rice, one cup or a bit more, vegetables, fish or meat  Dinner: brown rice or yellow rice, one cup or a bit more, vegetables, fish or meat  Snacks: fruit or chips  Beverages: Water (1-2 bottles/water/day, occasionally a sweet beverage)  Has patient met with a dietitian in the past?: Yes    Being Active:  Being Active Assessed Today: Yes  Exercise:: Currently not exercising  Barrier to exercise: Other (weather)    Monitoring:  Monitoring Assessed Today: Yes  Did patient bring glucose meter to appointment? : Yes  Blood Glucose Meter: Accu-chek  Times checking blood sugar at home (number): 1  Times checking blood sugar at home (per): Day  Blood glucose trend: Fluctuating    Blood glucose log:  FBS: 145,155,149,279,277,119,112,220  PP( one hour): 360,262,267,182    Taking Medications:  Diabetes Medication(s)     Biguanides       metFORMIN (GLUCOPHAGE XR) 500 MG 24 hr tablet    Take 2 tablets (1,000 mg) by mouth 2 times daily (with meals) for 90 days    Sodium-Glucose Co-Transporter 2 (SGLT2) Inhibitors       canagliflozin (INVOKANA) 100 MG tablet    Take 1 tablet (100 mg) by mouth every morning (before breakfast)     empagliflozin (JARDIANCE) 10 MG TABS tablet    Take 1 tablet (10 mg) by mouth daily          Taking Medication Assessed Today: Yes  Current Treatments: Diet, Oral Medication (taken by mouth)  Problems taking diabetes  medications regularly?: No    Problem Solving:                 Reducing Risks:  Diabetes Risks: Age over 45 years, Sedentary Lifestyle, Ethnicity  Has dilated eye exam at least once a year?: Yes    Healthy Coping:  Emotional response to diabetes: Ready to learn  Stage of change: PREPARATION (Decided to change - considering how)  Patient Activation Measure Survey Score:  No flowsheet data found.      Care Plan and Education Provided:  Care Plan: Diabetes   Updates made by Viviana Hoffman RD since 11/15/2022 12:00 AM      Problem: HbA1C Not In Goal       Goal: Establish Regular Follow-Ups with PCP       Task: Discuss with PCP the recommended timing for patient's next follow up visit(s)    Responsible User: Viviana Hoffman RD      Task: Discuss schedule for PCP visits with patient    Responsible User: Viviana Hoffman RD      Goal: Get HbA1C Level in Goal       Task: Educate patient on diabetes education self-management topics    Responsible User: Viviana Hoffman RD      Task: Educate patient on benefits of regular glucose monitoring    Responsible User: Viviana Hoffman RD      Task: Refer patient to appropriate extended care team member, as needed (Medication Therapy Management, Behavioral Health, Physical Therapy, etc.)    Responsible User: Viviana Hoffman RD      Task: Discuss diabetes treatment plan with patient    Responsible User: Viviana Hoffman RD      Problem: Diabetes Self-Management Education Needed to Optimize Self-Care Behaviors       Goal: Understand diabetes pathophysiology and disease progression       Task: Provide education on diabetes pathophysiology and disease progression specfic to patient's diabetes type    Responsible User: Viviana Hoffman RD      Goal: Healthy Eating - follow a healthy eating pattern for diabetes       Task: Provide education on portion control and consistency in amount, composition and timing of food intake Completed 11/15/2022   Responsible User: Viviana Hoffman RD      Task:  Provide education on managing carbohydrate intake (carbohydrate counting, plate planning method, etc.) Completed 11/15/2022   Responsible User: Viviana Hoffman RD      Task: Provide education on weight management Completed 11/15/2022   Responsible User: Viviana Hoffman RD      Task: Provide education on heart healthy eating    Responsible User: Viviana Hoffman RD      Task: Provide education on eating out    Responsible User: Viviana Hoffman RD      Task: Develop individualized healthy eating plan with patient    Responsible User: Viviana Hoffman RD      Goal: Being Active - get regular physical activity, working up to at least 150 minutes per week       Task: Provide education on relationship of activity to glucose and precautions to take if at risk for low glucose Completed 11/15/2022   Responsible User: Viviana Hoffman RD      Task: Discuss barriers to physical activity with patient    Responsible User: Viviana Hoffman RD      Task: Develop physical activity plan with patient    Responsible User: Viviana Hoffman RD      Task: Explore community resources including walking groups, assistance programs, and home videos    Responsible User: Viviana Hoffman RD      Goal: Monitoring - monitor glucose and ketones as directed    This Visit's Progress: 50%   Note:    Check blood sugar once daily: fasting or two hours after a meal.      Task: Provide education on blood glucose monitoring (purpose, proper technique, frequency, glucose targets, interpreting results, when to use glucose control solution, sharps disposal)    Responsible User: Viviana Hoffman RD      Task: Provide education on continuous glucose monitoring (sensor placement, use of mariann or /reader, understanding glucose trends, alerts and alarms, differences between sensor glucose and blood glucose)    Responsible User: Viviana Hoffman RD      Task: Provide education on ketone monitoring (when to monitor, frequency, etc.)    Responsible User: Yasmin  CRYSTAL Michelle      Goal: Taking Medication - patient is consistently taking medications as directed       Task: Provide education on action of prescribed medication, including when to take and possible side effects    Responsible User: Viviana Hoffman RD      Task: Provide education on insulin and injectable diabetes medications, including administration, storage, site selection and rotation for injection sites    Responsible User: Viviana Hoffman RD      Task: Discuss barriers to medication adherence with patient and provide management technique ideas as appropriate    Responsible User: Viviana Hoffman RD      Task: Provide education on frequency and refill details of medications    Responsible User: Vivaina Hoffman RD      Goal: Problem Solving - know how to prevent and manage short-term diabetes complications       Task: Provide education on high blood glucose - causes, signs/symptoms, prevention and treatment    Responsible User: Viviana Hoffman RD      Task: Provide education on low blood glucose - causes, signs/symptoms, prevention, treatment, carrying a carbohydrate source at all times, and medical identification    Responsible User: Viviana Hoffman RD      Task: Provide education on safe travel with diabetes    Responsible User: Viviana Hoffman RD      Task: Provide education on how to care for diabetes on sick days    Responsible User: Viviana Hoffman RD      Task: Provide education on when to call a health care provider    Responsible User: Viviana Hoffman RD      Goal: Reducing Risks - know how to prevent and treat long-term diabetes complications       Task: Provide education on major complications of diabetes, prevention, early diagnostic measures and treatment of complications    Responsible User: Viviana Hoffman RD      Task: Provide education on recommended care for dental, eye and foot health    Responsible User: Viviana Hoffman RD      Task: Provide education on Hemoglobin A1c - goals and relationship  to blood glucose levels    Responsible User: Viviana Hoffman RD      Task: Provide education on recommendations for heart health - lipid levels and goals, blood pressure and goals, and aspirin therapy, if indicated    Responsible User: Viviana Hoffman RD      Task: Provide education on tobacco cessation    Responsible User: Viviana Hoffman RD      Goal: Healthy Coping - use available resources to cope with the challenges of managing diabetes       Task: Discuss recognizing feelings about having diabetes    Responsible User: Viviana Hoffman RD      Task: Provide education on the benefits of making appropriate lifestyle changes    Responsible User: Viviana Hoffman RD      Task: Provide education on benefits of utilizing support systems    Responsible User: Viviana Hoffman RD      Task: Discuss methods for coping with stress    Responsible User: Viviana Hoffman RD      Task: Provide education on when to seek professional counseling    Responsible User: Viviana Hoffman RD              Time Spent: 30 minutes  Encounter Type: Individual    Any diabetes medication dose changes were made via the CDE Protocol per the patient's primary care provider. A copy of this encounter was shared with the provider.

## 2023-01-18 DIAGNOSIS — E78.00 PURE HYPERCHOLESTEROLEMIA: ICD-10-CM

## 2023-01-19 DIAGNOSIS — E78.00 PURE HYPERCHOLESTEROLEMIA: ICD-10-CM

## 2023-01-19 RX ORDER — ATORVASTATIN CALCIUM 10 MG/1
TABLET, FILM COATED ORAL
Qty: 90 TABLET | Refills: 3 | OUTPATIENT
Start: 2023-01-19

## 2023-01-20 RX ORDER — ATORVASTATIN CALCIUM 10 MG/1
TABLET, FILM COATED ORAL
Qty: 90 TABLET | Refills: 3 | OUTPATIENT
Start: 2023-01-20

## 2023-03-31 ENCOUNTER — ANCILLARY PROCEDURE (OUTPATIENT)
Dept: GENERAL RADIOLOGY | Facility: CLINIC | Age: 58
End: 2023-03-31
Attending: STUDENT IN AN ORGANIZED HEALTH CARE EDUCATION/TRAINING PROGRAM
Payer: MEDICARE

## 2023-03-31 ENCOUNTER — OFFICE VISIT (OUTPATIENT)
Dept: FAMILY MEDICINE | Facility: CLINIC | Age: 58
End: 2023-03-31
Payer: MEDICARE

## 2023-03-31 VITALS
WEIGHT: 181 LBS | HEIGHT: 59 IN | RESPIRATION RATE: 16 BRPM | BODY MASS INDEX: 36.49 KG/M2 | DIASTOLIC BLOOD PRESSURE: 82 MMHG | TEMPERATURE: 97.5 F | SYSTOLIC BLOOD PRESSURE: 127 MMHG | OXYGEN SATURATION: 98 % | HEART RATE: 77 BPM

## 2023-03-31 DIAGNOSIS — Z13.220 SCREENING FOR HYPERLIPIDEMIA: ICD-10-CM

## 2023-03-31 DIAGNOSIS — M54.50 CHRONIC BILATERAL LOW BACK PAIN WITHOUT SCIATICA: Primary | ICD-10-CM

## 2023-03-31 DIAGNOSIS — M25.562 BILATERAL CHRONIC KNEE PAIN: ICD-10-CM

## 2023-03-31 DIAGNOSIS — M25.561 BILATERAL CHRONIC KNEE PAIN: ICD-10-CM

## 2023-03-31 DIAGNOSIS — F33.0 MILD EPISODE OF RECURRENT MAJOR DEPRESSIVE DISORDER (H): ICD-10-CM

## 2023-03-31 DIAGNOSIS — G89.29 BILATERAL CHRONIC KNEE PAIN: ICD-10-CM

## 2023-03-31 DIAGNOSIS — G89.29 OTHER CHRONIC PAIN: ICD-10-CM

## 2023-03-31 DIAGNOSIS — E66.01 MORBID OBESITY (H): ICD-10-CM

## 2023-03-31 DIAGNOSIS — E11.65 TYPE 2 DIABETES MELLITUS WITH HYPERGLYCEMIA, WITHOUT LONG-TERM CURRENT USE OF INSULIN (H): ICD-10-CM

## 2023-03-31 DIAGNOSIS — G89.29 CHRONIC BILATERAL LOW BACK PAIN WITHOUT SCIATICA: Primary | ICD-10-CM

## 2023-03-31 LAB
ALBUMIN SERPL BCG-MCNC: 4.4 G/DL (ref 3.5–5.2)
ALP SERPL-CCNC: 113 U/L (ref 35–104)
ALT SERPL W P-5'-P-CCNC: 36 U/L (ref 10–35)
ANION GAP SERPL CALCULATED.3IONS-SCNC: 15 MMOL/L (ref 7–15)
AST SERPL W P-5'-P-CCNC: 35 U/L (ref 10–35)
BILIRUB SERPL-MCNC: 0.9 MG/DL
BUN SERPL-MCNC: 21.3 MG/DL (ref 6–20)
CALCIUM SERPL-MCNC: 9.7 MG/DL (ref 8.6–10)
CHLORIDE SERPL-SCNC: 104 MMOL/L (ref 98–107)
CHOLEST SERPL-MCNC: 159 MG/DL
CREAT SERPL-MCNC: 0.81 MG/DL (ref 0.51–0.95)
DEPRECATED HCO3 PLAS-SCNC: 19 MMOL/L (ref 22–29)
GFR SERPL CREATININE-BSD FRML MDRD: 84 ML/MIN/1.73M2
GLUCOSE SERPL-MCNC: 193 MG/DL (ref 70–99)
HBA1C MFR BLD: 7.9 % (ref 0–5.6)
HDLC SERPL-MCNC: 71 MG/DL
LDLC SERPL CALC-MCNC: 80 MG/DL
NONHDLC SERPL-MCNC: 88 MG/DL
POTASSIUM SERPL-SCNC: 4.5 MMOL/L (ref 3.4–5.3)
PROT SERPL-MCNC: 7.7 G/DL (ref 6.4–8.3)
SODIUM SERPL-SCNC: 138 MMOL/L (ref 136–145)
TRIGL SERPL-MCNC: 38 MG/DL

## 2023-03-31 PROCEDURE — 80053 COMPREHEN METABOLIC PANEL: CPT | Performed by: STUDENT IN AN ORGANIZED HEALTH CARE EDUCATION/TRAINING PROGRAM

## 2023-03-31 PROCEDURE — 73565 X-RAY EXAM OF KNEES: CPT | Mod: TC | Performed by: RADIOLOGY

## 2023-03-31 PROCEDURE — 72100 X-RAY EXAM L-S SPINE 2/3 VWS: CPT | Mod: TC | Performed by: STUDENT IN AN ORGANIZED HEALTH CARE EDUCATION/TRAINING PROGRAM

## 2023-03-31 PROCEDURE — 36415 COLL VENOUS BLD VENIPUNCTURE: CPT | Performed by: STUDENT IN AN ORGANIZED HEALTH CARE EDUCATION/TRAINING PROGRAM

## 2023-03-31 PROCEDURE — 80061 LIPID PANEL: CPT | Performed by: STUDENT IN AN ORGANIZED HEALTH CARE EDUCATION/TRAINING PROGRAM

## 2023-03-31 PROCEDURE — 83036 HEMOGLOBIN GLYCOSYLATED A1C: CPT | Performed by: STUDENT IN AN ORGANIZED HEALTH CARE EDUCATION/TRAINING PROGRAM

## 2023-03-31 PROCEDURE — 99214 OFFICE O/P EST MOD 30 MIN: CPT | Performed by: STUDENT IN AN ORGANIZED HEALTH CARE EDUCATION/TRAINING PROGRAM

## 2023-03-31 ASSESSMENT — PATIENT HEALTH QUESTIONNAIRE - PHQ9
SUM OF ALL RESPONSES TO PHQ QUESTIONS 1-9: 5
SUM OF ALL RESPONSES TO PHQ QUESTIONS 1-9: 5
10. IF YOU CHECKED OFF ANY PROBLEMS, HOW DIFFICULT HAVE THESE PROBLEMS MADE IT FOR YOU TO DO YOUR WORK, TAKE CARE OF THINGS AT HOME, OR GET ALONG WITH OTHER PEOPLE: SOMEWHAT DIFFICULT

## 2023-03-31 ASSESSMENT — ASTHMA QUESTIONNAIRES
QUESTION_5 LAST FOUR WEEKS HOW WOULD YOU RATE YOUR ASTHMA CONTROL: WELL CONTROLLED
QUESTION_2 LAST FOUR WEEKS HOW OFTEN HAVE YOU HAD SHORTNESS OF BREATH: NOT AT ALL
QUESTION_4 LAST FOUR WEEKS HOW OFTEN HAVE YOU USED YOUR RESCUE INHALER OR NEBULIZER MEDICATION (SUCH AS ALBUTEROL): NOT AT ALL
ACT_TOTALSCORE: 24
ACT_TOTALSCORE: 24
QUESTION_3 LAST FOUR WEEKS HOW OFTEN DID YOUR ASTHMA SYMPTOMS (WHEEZING, COUGHING, SHORTNESS OF BREATH, CHEST TIGHTNESS OR PAIN) WAKE YOU UP AT NIGHT OR EARLIER THAN USUAL IN THE MORNING: NOT AT ALL
QUESTION_1 LAST FOUR WEEKS HOW MUCH OF THE TIME DID YOUR ASTHMA KEEP YOU FROM GETTING AS MUCH DONE AT WORK, SCHOOL OR AT HOME: NONE OF THE TIME

## 2023-03-31 NOTE — PROGRESS NOTES
Assessment & Plan     Chronic bilateral low back pain without sciatica  Chronic, no red flag signs.  Patient requested letter of support/materials/chairs from .  Letter provided.  We will also order lumbar x-ray given annual imaging on file and chronicity of pain.  - XR Lumbar Spine 2/3 Views    Bilateral chronic knee pain  Chronic, no signs of infection.  Given along patient's had this pain, will order x-ray.  - Cane Order for DME - ONLY FOR DME  - XR Knee AP Standing Bilateral    Morbid obesity (H)  Chronic, BMI 36.  - Discussed Lifestyle modifications, including: Increasing intake of vegetables and fruits, decreasing intake of processed foods/carbohydrates/sugars, having regular physical activity, and losing weight.  - Comprehensive metabolic panel (BMP + Alb, Alk Phos, ALT, AST, Total. Bili, TP)  - Comprehensive metabolic panel (BMP + Alb, Alk Phos, ALT, AST, Total. Bili, TP)    Mild episode of recurrent major depressive disorder (H)  Chronic, stable.  - Current management    Type 2 diabetes mellitus with hyperglycemia, without long-term current use of insulin (H)  Borderline controlled.  We will recheck today.  Current management for now.  - HEMOGLOBIN A1C  - Miscellaneous Order for DME - ONLY FOR DME  - HEMOGLOBIN A1C    Screening for hyperlipidemia  - Lipid Profile (Chol, Trig, HDL, LDL calc)  - Lipid Profile (Chol, Trig, HDL, LDL calc)      Review of external notes as documented elsewhere in note      Alea Kendrick MD  [facility]    Delvin Melchor is a 57 year old, presenting for the following health issues:  office visit (Back pain)    Additional Questions 3/31/2023   Roomed by    Accompanied by self     History of Present Illness       Reason for visit:  Back pain    She eats 0-1 servings of fruits and vegetables daily.She consumes 0 sweetened beverage(s) daily.She exercises with enough effort to increase her heart rate 10 to 19 minutes per day.  She exercises with  "enough effort to increase her heart rate 3 or less days per week. She is missing 1 dose(s) of medications per week.  She is not taking prescribed medications regularly due to remembering to take.    Today's PHQ-9         PHQ-9 Total Score: 5    PHQ-9 Q9 Thoughts of better off dead/self-harm past 2 weeks :   Not at all    How difficult have these problems made it for you to do your work, take care of things at home, or get along with other people: Somewhat difficult     Back Pain  - Denies red flags, including: Fever, history of IV drug use or malignancy, trauma or injury, loss of bowel or bladder control, saddle anesthesia, numbness, weakness, or tingling of lower extremities.    Bilateral Knee Pain  - chronic  - reports that she thinks she went to PT(?) and it was not helpful  - XR right knee 11/28/17: There is mild medial compartment narrowing bilaterally. Joint space alignment appears otherwise normal. No fractures or other osseous abnormalities are identified.  - would like massage chair from  - would like doctor to give statement that due to conditions (letter of support) will help minimize pain.     DM2  Lab Results   Component Value Date    A1C 8.4 09/13/2022    A1C 9.1 06/08/2022    A1C 7.8 01/13/2021    A1C 6.9 09/28/2020    A1C 7.1 03/05/2020    A1C 5.7 01/02/2015    A1C 5.1 05/30/2013         Review of Systems   Constitutional: Negative for fever and chills.   Respiratory: Negative for cough or shortness of breath.    Cardiovascular: Negative for chest pain or chest pressure.   Gastrointestinal: Negative for nausea, vomiting, diarrhea or abdominal pain.        Objective    /82 (BP Location: Left arm, Patient Position: Sitting, Cuff Size: Adult Large)   Pulse 77   Temp 97.5  F (36.4  C) (Temporal)   Resp 16   Ht 1.493 m (4' 10.78\")   Wt 82.1 kg (181 lb)   SpO2 98%   BMI 36.83 kg/m    Body mass index is 36.83 kg/m .  Physical Exam   General Appearance:  Alert, cooperative, no " distress, appears stated age.  Head:  Normocephalic, without obvious abnormality, atraumatic.  Eyes:  Conjunctivae/corneas clear, extraocular movements intact both eyes.  Lungs:  Clear to auscultation bilaterally, respirations unlabored.  Heart:  Regular rate and rhythm, S1 and S2 normal, no murmur, rub or gallop.  Abdomen:  Soft, non-tender, bowel sounds active all four quadrants.   Back: No tenderness palpation of the spine or paraspinal muscles.  Straight leg raise test negative bilaterally.  Extremities:  Atraumatic, no cyanosis or edema.  Skin:  Skin color, texture, turgor normal, no rashes or lesions.  Neurologic: No focal deficits.    Lab Results   Component Value Date    CR 0.81 03/31/2023    AST 35 03/31/2023    ALT 36 (H) 03/31/2023    GFRESTIMATED 84 03/31/2023    HCVAB Negative 03/05/2020    HIAGAB Negative 03/05/2020     Lab Results   Component Value Date    A1C 8.4 09/13/2022    A1C 9.1 06/08/2022    A1C 7.8 01/13/2021    A1C 6.9 09/28/2020    A1C 7.1 03/05/2020    A1C 5.7 01/02/2015    A1C 5.1 05/30/2013

## 2023-03-31 NOTE — LETTER
March 31, 2023      Ruiz Delacruz  [address] [address]        To Whom It May Concern:    Ruiz Delacruz  was seen on 3/31/2023.  She has chronic low back pain, chronic bilateral knee pain, and chronic depression.  This is a letter of support that patient could benefit from massage materials/chair.        Sincerely,        Alea Kendrick MD     Consent (Temporal Branch)/Introductory Paragraph: The rationale for Mohs was explained to the patient and consent was obtained. The risks, benefits and alternatives to therapy were discussed in detail. Specifically, the risks of damage to the temporal branch of the facial nerve, infection, scarring, pain, bleeding, prolonged wound healing, incomplete removal, allergy to anesthesia, nerve injury, recurrence and the possible need of delayed or additional reconstruction were addressed.  Prior to the procedure, the treatment site was clearly identified and confirmed by the patient. All components of Universal Protocol/PAUSE Rule completed.

## 2023-04-03 ENCOUNTER — TELEPHONE (OUTPATIENT)
Dept: FAMILY MEDICINE | Facility: CLINIC | Age: 58
End: 2023-04-03
Payer: MEDICARE

## 2023-04-03 NOTE — TELEPHONE ENCOUNTER
Called patient to relay provider test result message and recommendations below.  Patient verbalized understood.    JEOVANNY Card, RN  [facility]    Alea Kendrick MD  [facility]  Electrolytes, kidney function, liver function are within normal range.     Cholesterol levels were normal.     Diabetes control has improved.  A1c 7.9. Continue Lifestyle modifications, including: Increasing intake of vegetables and fruits, decreasing intake of processed foods/carbohydrates/sugars, having regular physical activity, and losing weight.

## 2023-04-03 NOTE — TELEPHONE ENCOUNTER
----- Message from Alea Kendrick MD sent at 4/3/2023  2:03 PM CDT -----  Electrolytes, kidney function, liver function are within normal range.    Cholesterol levels were normal.    Diabetes control has improved.  A1c 7.9. Continue Lifestyle modifications, including: Increasing intake of vegetables and fruits, decreasing intake of processed foods/carbohydrates/sugars, having regular physical activity, and losing weight.

## 2023-04-06 ENCOUNTER — TELEPHONE (OUTPATIENT)
Dept: FAMILY MEDICINE | Facility: CLINIC | Age: 58
End: 2023-04-06
Payer: MEDICARE

## 2023-04-06 DIAGNOSIS — E78.00 PURE HYPERCHOLESTEROLEMIA: ICD-10-CM

## 2023-04-06 DIAGNOSIS — M85.88 OSTEOPENIA OF LUMBAR SPINE: Primary | ICD-10-CM

## 2023-04-06 NOTE — TELEPHONE ENCOUNTER
----- Message from Alea Kendrick MD sent at 4/6/2023  2:24 PM CDT -----  X-ray of knees did not show any acute findings or fractures.  It does show mild arthritis.  We can consider referral to PT if patient would like.

## 2023-04-06 NOTE — TELEPHONE ENCOUNTER
Called pt and relayed both x-ray results. Pt verbalized understanding.      - Pt would like rx (calcium and vitamin D) send to [id number] - [place], [place] - [address]  AT [facility] [address]    - pt would like a PT referral.    Woo Ochoa, BSN RN  [facility]

## 2023-04-07 RX ORDER — ATORVASTATIN CALCIUM 10 MG/1
TABLET, FILM COATED ORAL
Qty: 90 TABLET | Refills: 3 | Status: SHIPPED | OUTPATIENT
Start: 2023-04-07 | End: 2023-12-22

## 2023-04-07 NOTE — TELEPHONE ENCOUNTER
"Routing refill request to provider for review/approval because:  Early refill request    Last Written Prescription Date:  5/27/22  Last Fill Quantity: 90,  # refills: 3   Last office visit provider:   3/31/23    Requested Prescriptions   Pending Prescriptions Disp Refills     atorvastatin (LIPITOR) 10 MG tablet [Pharmacy Med Name: ATORVASTATIN 10MG TABLETS] 90 tablet 3     Sig: TAKE 1 TABLET(10 MG) BY MOUTH DAILY       Statins Protocol Passed - 4/7/2023  1:39 PM        Passed - LDL on file in past 12 months     Recent Labs   Lab Test 03/31/23  1226   LDL 80             Passed - No abnormal creatine kinase in past 12 months     No lab results found.             Passed - Recent (12 mo) or future (30 days) visit within the authorizing provider's specialty     Patient has had an office visit with the authorizing provider or a provider within the authorizing providers department within the previous 12 mos or has a future within next 30 days. See \"Patient Info\" tab in inbasket, or \"Choose Columns\" in Meds & Orders section of the refill encounter.              Passed - Medication is active on med list        Passed - Patient is age 18 or older        Passed - No active pregnancy on record        Passed - No positive pregnancy test in past 12 months             VISHAL RUIZ RN 04/07/23 1:39 PM  "

## 2023-07-12 DIAGNOSIS — E11.65 TYPE 2 DIABETES MELLITUS WITH HYPERGLYCEMIA, WITHOUT LONG-TERM CURRENT USE OF INSULIN (H): ICD-10-CM

## 2023-07-12 NOTE — TELEPHONE ENCOUNTER
"Routing refill request to provider for review/approval because:  Prescription     Last Written Prescription Date:  2022  Last Fill Quantity: 360,  # refills: 3   Last office visit provider:  2023     Requested Prescriptions   Pending Prescriptions Disp Refills    metFORMIN (GLUCOPHAGE XR) 500 MG 24 hr tablet [Pharmacy Med Name: METFORMIN ER 500MG 24HR TABS] 360 tablet 3     Sig: TAKE 2 TABLETS(1000 MG) BY MOUTH TWICE DAILY WITH MEALS       Biguanide Agents Passed - 2023 12:49 PM        Passed - Patient is age 10 or older        Passed - Patient has documented A1c within the specified period of time.     If HgbA1C is 8 or greater, it needs to be on file within the past 3 months.  If less than 8, must be on file within the past 6 months.     Recent Labs   Lab Test 23  1226   A1C 7.9*             Passed - Patient's CR is NOT>1.4 OR Patient's EGFR is NOT<45 within past 12 mos.     Recent Labs   Lab Test 23  1226 22  1257 21  1147   GFRESTIMATED 84   < > >60   GFRESTBLACK  --   --  >60    < > = values in this interval not displayed.       Recent Labs   Lab Test 23  1226   CR 0.81             Passed - Patient does NOT have a diagnosis of CHF.        Passed - Medication is active on med list        Passed - Patient is not pregnant        Passed - Patient has not had a positive pregnancy test within the past 12 mos.         Passed - Recent (6 mo) or future (30 days) visit within the authorizing provider's specialty     Patient had office visit in the last 6 months or has a visit in the next 30 days with authorizing provider or within the authorizing provider's specialty.  See \"Patient Info\" tab in inbasket, or \"Choose Columns\" in Meds & Orders section of the refill encounter.                 SHARITA BECKER RN 23 12:51 PM  "

## 2023-07-13 RX ORDER — METFORMIN HCL 500 MG
TABLET, EXTENDED RELEASE 24 HR ORAL
Qty: 360 TABLET | Refills: 3 | Status: SHIPPED | OUTPATIENT
Start: 2023-07-13

## 2023-08-11 ENCOUNTER — TRANSFERRED RECORDS (OUTPATIENT)
Dept: MULTI SPECIALTY CLINIC | Facility: CLINIC | Age: 58
End: 2023-08-11

## 2023-08-11 LAB — RETINOPATHY: NORMAL

## 2023-11-28 ENCOUNTER — APPOINTMENT (OUTPATIENT)
Dept: INTERPRETER SERVICES | Facility: CLINIC | Age: 58
End: 2023-11-28
Payer: MEDICARE

## 2023-11-28 ENCOUNTER — TELEPHONE (OUTPATIENT)
Dept: FAMILY MEDICINE | Facility: CLINIC | Age: 58
End: 2023-11-28
Payer: MEDICARE

## 2023-11-28 NOTE — TELEPHONE ENCOUNTER
Patient Quality Outreach    Patient is due for the following:   Breast Cancer Screening - Mammogram    Next Steps:   Schedule a Adult Preventative    Type of outreach:    Phone, left message for patient/parent to call back.      Questions for provider review:    None           Andrea Behrend

## 2023-12-06 NOTE — TELEPHONE ENCOUNTER
Patient Quality Outreach    Patient is due for the following:   Breast Cancer Screening - Mammogram    Next Steps:   Patient was scheduled for a mammogram on 12/22/23    Type of outreach:    Phone, spoke to patient/parent. Spoke with patient with assistance from an       Questions for provider review:    None           Andrea Behrend

## 2023-12-22 ENCOUNTER — ANCILLARY PROCEDURE (OUTPATIENT)
Dept: MAMMOGRAPHY | Facility: CLINIC | Age: 58
End: 2023-12-22
Attending: STUDENT IN AN ORGANIZED HEALTH CARE EDUCATION/TRAINING PROGRAM
Payer: MEDICARE

## 2023-12-22 ENCOUNTER — OFFICE VISIT (OUTPATIENT)
Dept: FAMILY MEDICINE | Facility: CLINIC | Age: 58
End: 2023-12-22
Payer: MEDICARE

## 2023-12-22 VITALS
WEIGHT: 179 LBS | RESPIRATION RATE: 20 BRPM | OXYGEN SATURATION: 98 % | HEART RATE: 75 BPM | SYSTOLIC BLOOD PRESSURE: 110 MMHG | HEIGHT: 59 IN | DIASTOLIC BLOOD PRESSURE: 60 MMHG | BODY MASS INDEX: 36.08 KG/M2

## 2023-12-22 DIAGNOSIS — G89.4 CHRONIC PAIN SYNDROME: ICD-10-CM

## 2023-12-22 DIAGNOSIS — E11.65 TYPE 2 DIABETES MELLITUS WITH HYPERGLYCEMIA, WITHOUT LONG-TERM CURRENT USE OF INSULIN (H): ICD-10-CM

## 2023-12-22 DIAGNOSIS — F33.1 MODERATE EPISODE OF RECURRENT MAJOR DEPRESSIVE DISORDER (H): ICD-10-CM

## 2023-12-22 DIAGNOSIS — G89.29 CHRONIC BILATERAL LOW BACK PAIN WITHOUT SCIATICA: ICD-10-CM

## 2023-12-22 DIAGNOSIS — M25.561 BILATERAL CHRONIC KNEE PAIN: ICD-10-CM

## 2023-12-22 DIAGNOSIS — M25.562 BILATERAL CHRONIC KNEE PAIN: ICD-10-CM

## 2023-12-22 DIAGNOSIS — M54.50 CHRONIC BILATERAL LOW BACK PAIN WITHOUT SCIATICA: ICD-10-CM

## 2023-12-22 DIAGNOSIS — Z00.00 ENCOUNTER FOR MEDICARE ANNUAL WELLNESS EXAM: Primary | ICD-10-CM

## 2023-12-22 DIAGNOSIS — Z74.09 IMPAIRED MOBILITY: ICD-10-CM

## 2023-12-22 DIAGNOSIS — Z23 NEEDS FLU SHOT: ICD-10-CM

## 2023-12-22 DIAGNOSIS — Z12.31 VISIT FOR SCREENING MAMMOGRAM: ICD-10-CM

## 2023-12-22 DIAGNOSIS — G89.29 OTHER CHRONIC PAIN: ICD-10-CM

## 2023-12-22 DIAGNOSIS — Z23 NEED FOR PROPHYLACTIC VACCINATION AGAINST STREPTOCOCCUS PNEUMONIAE (PNEUMOCOCCUS): ICD-10-CM

## 2023-12-22 DIAGNOSIS — Z23 NEED FOR COVID-19 VACCINE: ICD-10-CM

## 2023-12-22 DIAGNOSIS — R06.2 WHEEZING: ICD-10-CM

## 2023-12-22 DIAGNOSIS — E66.812 CLASS 2 SEVERE OBESITY WITH SERIOUS COMORBIDITY AND BODY MASS INDEX (BMI) OF 36.0 TO 36.9 IN ADULT, UNSPECIFIED OBESITY TYPE (H): ICD-10-CM

## 2023-12-22 DIAGNOSIS — E66.01 CLASS 2 SEVERE OBESITY WITH SERIOUS COMORBIDITY AND BODY MASS INDEX (BMI) OF 36.0 TO 36.9 IN ADULT, UNSPECIFIED OBESITY TYPE (H): ICD-10-CM

## 2023-12-22 DIAGNOSIS — E78.00 PURE HYPERCHOLESTEROLEMIA: ICD-10-CM

## 2023-12-22 DIAGNOSIS — G89.29 BILATERAL CHRONIC KNEE PAIN: ICD-10-CM

## 2023-12-22 LAB
CREAT UR-MCNC: 127 MG/DL
HBA1C MFR BLD: 8.1 % (ref 0–5.6)
MICROALBUMIN UR-MCNC: <12 MG/L
MICROALBUMIN/CREAT UR: NORMAL MG/G{CREAT}

## 2023-12-22 PROCEDURE — 99214 OFFICE O/P EST MOD 30 MIN: CPT | Mod: 25 | Performed by: STUDENT IN AN ORGANIZED HEALTH CARE EDUCATION/TRAINING PROGRAM

## 2023-12-22 PROCEDURE — 91320 SARSCV2 VAC 30MCG TRS-SUC IM: CPT | Performed by: STUDENT IN AN ORGANIZED HEALTH CARE EDUCATION/TRAINING PROGRAM

## 2023-12-22 PROCEDURE — 90480 ADMN SARSCOV2 VAC 1/ONLY CMP: CPT | Performed by: STUDENT IN AN ORGANIZED HEALTH CARE EDUCATION/TRAINING PROGRAM

## 2023-12-22 PROCEDURE — 77067 SCR MAMMO BI INCL CAD: CPT | Mod: TC | Performed by: RADIOLOGY

## 2023-12-22 PROCEDURE — 82570 ASSAY OF URINE CREATININE: CPT | Performed by: STUDENT IN AN ORGANIZED HEALTH CARE EDUCATION/TRAINING PROGRAM

## 2023-12-22 PROCEDURE — G0009 ADMIN PNEUMOCOCCAL VACCINE: HCPCS | Performed by: STUDENT IN AN ORGANIZED HEALTH CARE EDUCATION/TRAINING PROGRAM

## 2023-12-22 PROCEDURE — G0439 PPPS, SUBSEQ VISIT: HCPCS | Performed by: STUDENT IN AN ORGANIZED HEALTH CARE EDUCATION/TRAINING PROGRAM

## 2023-12-22 PROCEDURE — 90682 RIV4 VACC RECOMBINANT DNA IM: CPT | Performed by: STUDENT IN AN ORGANIZED HEALTH CARE EDUCATION/TRAINING PROGRAM

## 2023-12-22 PROCEDURE — 90677 PCV20 VACCINE IM: CPT | Performed by: STUDENT IN AN ORGANIZED HEALTH CARE EDUCATION/TRAINING PROGRAM

## 2023-12-22 PROCEDURE — 83036 HEMOGLOBIN GLYCOSYLATED A1C: CPT | Performed by: STUDENT IN AN ORGANIZED HEALTH CARE EDUCATION/TRAINING PROGRAM

## 2023-12-22 PROCEDURE — G0008 ADMIN INFLUENZA VIRUS VAC: HCPCS | Performed by: STUDENT IN AN ORGANIZED HEALTH CARE EDUCATION/TRAINING PROGRAM

## 2023-12-22 PROCEDURE — 36415 COLL VENOUS BLD VENIPUNCTURE: CPT | Performed by: STUDENT IN AN ORGANIZED HEALTH CARE EDUCATION/TRAINING PROGRAM

## 2023-12-22 PROCEDURE — 82043 UR ALBUMIN QUANTITATIVE: CPT | Performed by: STUDENT IN AN ORGANIZED HEALTH CARE EDUCATION/TRAINING PROGRAM

## 2023-12-22 RX ORDER — DULOXETIN HYDROCHLORIDE 60 MG/1
60 CAPSULE, DELAYED RELEASE ORAL DAILY
Qty: 90 CAPSULE | Refills: 3 | Status: SHIPPED | OUTPATIENT
Start: 2023-12-22

## 2023-12-22 RX ORDER — BUDESONIDE AND FORMOTEROL FUMARATE DIHYDRATE 80; 4.5 UG/1; UG/1
2 AEROSOL RESPIRATORY (INHALATION) EVERY 4 HOURS PRN
Qty: 10.2 G | Refills: 1 | Status: SHIPPED | OUTPATIENT
Start: 2023-12-22

## 2023-12-22 RX ORDER — ATORVASTATIN CALCIUM 10 MG/1
10 TABLET, FILM COATED ORAL DAILY
Qty: 90 TABLET | Refills: 3 | Status: SHIPPED | OUTPATIENT
Start: 2023-12-22

## 2023-12-22 RX ORDER — ALBUTEROL SULFATE 90 UG/1
2 AEROSOL, METERED RESPIRATORY (INHALATION) EVERY 6 HOURS PRN
Status: CANCELLED | OUTPATIENT
Start: 2023-12-22

## 2023-12-22 RX ORDER — GUAIFENESIN 600 MG/1
1200 TABLET, EXTENDED RELEASE ORAL 2 TIMES DAILY
Qty: 30 TABLET | Refills: 0 | Status: SHIPPED | OUTPATIENT
Start: 2023-12-22

## 2023-12-22 RX ORDER — DULOXETIN HYDROCHLORIDE 30 MG/1
60 CAPSULE, DELAYED RELEASE ORAL DAILY
Qty: 30 CAPSULE | Refills: 11 | Status: CANCELLED | OUTPATIENT
Start: 2023-12-22

## 2023-12-22 ASSESSMENT — ACTIVITIES OF DAILY LIVING (ADL)
CURRENT_FUNCTION: PREPARING MEALS REQUIRES ASSISTANCE
CURRENT_FUNCTION: MONEY MANAGEMENT REQUIRES ASSISTANCE
CURRENT_FUNCTION: HOUSEWORK REQUIRES ASSISTANCE
CURRENT_FUNCTION: LAUNDRY REQUIRES ASSISTANCE
CURRENT_FUNCTION: TRANSPORTATION REQUIRES ASSISTANCE
CURRENT_FUNCTION: BATHING REQUIRES ASSISTANCE
CURRENT_FUNCTION: SHOPPING REQUIRES ASSISTANCE
CURRENT_FUNCTION: TELEPHONE REQUIRES ASSISTANCE
CURRENT_FUNCTION: MEDICATION ADMINISTRATION REQUIRES ASSISTANCE

## 2023-12-22 ASSESSMENT — ENCOUNTER SYMPTOMS
EYE PAIN: 0
ARTHRALGIAS: 1
FEVER: 0
FREQUENCY: 1
SORE THROAT: 0
PALPITATIONS: 1
PARESTHESIAS: 0
HEADACHES: 0
DIARRHEA: 0
SHORTNESS OF BREATH: 0
CHILLS: 0
HEARTBURN: 1
HEMATOCHEZIA: 0
DIZZINESS: 0
COUGH: 1
HEMATURIA: 0
CONSTIPATION: 1
ABDOMINAL PAIN: 0
BREAST MASS: 0
NERVOUS/ANXIOUS: 0
DYSURIA: 0
MYALGIAS: 1
NAUSEA: 0
JOINT SWELLING: 0
WEAKNESS: 1

## 2023-12-22 ASSESSMENT — PATIENT HEALTH QUESTIONNAIRE - PHQ9
SUM OF ALL RESPONSES TO PHQ QUESTIONS 1-9: 11
SUM OF ALL RESPONSES TO PHQ QUESTIONS 1-9: 11
10. IF YOU CHECKED OFF ANY PROBLEMS, HOW DIFFICULT HAVE THESE PROBLEMS MADE IT FOR YOU TO DO YOUR WORK, TAKE CARE OF THINGS AT HOME, OR GET ALONG WITH OTHER PEOPLE: SOMEWHAT DIFFICULT

## 2023-12-22 ASSESSMENT — ASTHMA QUESTIONNAIRES: ACT_TOTALSCORE: 13

## 2023-12-22 NOTE — PROGRESS NOTES
"SUBJECTIVE:   Ruiz is a 58 year old, presenting for the following:  Wellness Visit, Cough, and Derm Problem (Rash and itchiness )        12/22/2023     9:30 AM   Additional Questions   Roomed by M   Accompanied by self       Are you in the first 12 months of your Medicare coverage?  No    Healthy Habits:     In general, how would you rate your overall health?  Poor    Frequency of exercise:  2-3 days/week    Duration of exercise:  15-30 minutes    Do you usually eat at least 4 servings of fruit and vegetables a day, include whole grains    & fiber and avoid regularly eating high fat or \"junk\" foods?  No    Taking medications regularly:  No    Barriers to taking medications:  Problems remembering to take them and Other    Medication side effects:  None    Ability to successfully perform activities of daily living:  Telephone requires assistance, transportation requires assistance, shopping requires assistance, preparing meals requires assistance, housework requires assistance, bathing requires assistance, laundry requires assistance, medication administration requires assistance and money management requires assistance    Home Safety:  Lack of grab bars in the bathroom    Hearing Impairment:  Difficult to understand a speaker at a public meeting or [employer] service and difficulty understanding soft or whispered speech    In the past 6 months, have you been bothered by leaking of urine?  No    In general, how would you rate your overall mental or emotional health?  Fair    Additional concerns today:  No      Today's PHQ-9 Score:       12/22/2023     9:36 AM   PHQ-9 SCORE   PHQ-9 Total Score MyChart 11 (Moderate depression)   PHQ-9 Total Score 11     Wheezing  Coughing  ACT score 13  On albuterol prn  Per chart review, spirometry 2/26/09:  Spirometry results reviewed - not entirely convinced that they are accurate. Reversibility of 15% demonstrated between pre and post after albuterol neb. Much symptomatic relief " expressed by patient.  No fevers, tested for covid and negative    Via the Health Maintenance questionnaire, the patient has reported the following services have been completed -Eye Exam, this information has been sent to the abstraction team.  Have you ever done Advance Care Planning? (For example, a Health Directive, POLST, or a discussion with a medical provider or your loved ones about your wishes): No, advance care planning information given to patient to review.  Advanced care planning was discussed at today's visit.      Fall risk  Fallen 2 or more times in the past year?: No  Any fall with injury in the past year?: No    Cognitive Screening   1) Repeat 3 items (Leader, Season, Table)    2) Clock draw:  unable  3) 3 item recall: Recalls NO objects   Results: 0 items recalled: PROBABLE COGNITIVE IMPAIRMENT, **INFORM PROVIDER**    Mini-CogTM Copyright TOM Jara. Licensed by the author for use in [facility]; reprinted with permission (jen@[email or web address]). All rights reserved.      Do you have sleep apnea, excessive snoring or daytime drowsiness? : no    Reviewed and updated as needed this visit by clinical staff   Tobacco  Allergies  Meds  Problems             Reviewed and updated as needed this visit by Provider      Problems            Social History     Tobacco Use     Smoking status: Never     Smokeless tobacco: Never   Substance Use Topics     Alcohol use: No             12/22/2023     9:47 AM   Alcohol Use   Prescreen: >3 drinks/day or >7 drinks/week? Not Applicable     Do you have a current opioid prescription? No  Do you use any other controlled substances or medications that are not prescribed by a provider? None              Current providers sharing in care for this patient include:   Patient Care Team:  Alea Kendrick MD as PCP - General (Family Medicine)  Alea Kendrick MD as Assigned PCP    The following health maintenance items are reviewed in Epic and correct as of today:  Health Maintenance    Topic Date Due     DEPRESSION ACTION PLAN  Never done     HEPATITIS B IMMUNIZATION (1 of 3 - 3-dose series) Never done     ZOSTER IMMUNIZATION (1 of 2) Never done     HPV TEST  10/23/2020     PAP  10/23/2020     COLORECTAL CANCER SCREENING  01/22/2022     DTAP/TDAP/TD IMMUNIZATION (7 - Td or Tdap) 05/30/2023     DIABETIC FOOT EXAM  06/08/2023     ASTHMA ACTION PLAN  06/08/2023     A1C  03/22/2024     PHQ-9  03/22/2024     BMP  03/31/2024     LIPID  03/31/2024     ASTHMA CONTROL TEST  06/22/2024     EYE EXAM  08/11/2024     MEDICARE ANNUAL WELLNESS VISIT  12/22/2024     MICROALBUMIN  12/22/2024     ANNUAL REVIEW OF HM ORDERS  12/22/2024     MAMMO SCREENING  12/22/2025     ADVANCE CARE PLANNING  01/29/2026     HEPATITIS C SCREENING  Completed     HIV SCREENING  Completed     INFLUENZA VACCINE  Completed     Pneumococcal Vaccine: Pediatrics (0 to 5 Years) and At-Risk Patients (6 to 64 Years)  Completed     COVID-19 Vaccine  Completed     IPV IMMUNIZATION  Aged Out     HPV IMMUNIZATION  Aged Out     MENINGITIS IMMUNIZATION  Aged Out     RSV MONOCLONAL ANTIBODY  Aged Out     Lab work is in process  Labs reviewed in EPIC      FHS-7:       12/22/2023    10:49 AM   Breast CA Risk Assessment (FHS-7)   Did any of your first-degree relatives have breast or ovarian cancer? No   Did any of your relatives have bilateral breast cancer? No   Did any man in your family have breast cancer? No   Did any woman in your family have breast and ovarian cancer? No   Did any woman in your family have breast cancer before age 50 y? No   Do you have 2 or more relatives with breast and/or ovarian cancer? No   Do you have 2 or more relatives with breast and/or bowel cancer? No         Pertinent mammograms are reviewed under the imaging tab.    Review of Systems   Constitutional:  Negative for chills and fever.   HENT:  Negative for congestion, ear pain, hearing loss and sore throat.    Eyes:  Positive for visual disturbance. Negative for pain.  "  Respiratory:  Positive for cough. Negative for shortness of breath.    Cardiovascular:  Positive for palpitations. Negative for chest pain and peripheral edema.   Gastrointestinal:  Positive for constipation and heartburn. Negative for abdominal pain, diarrhea, hematochezia and nausea.   Breasts:  Negative for tenderness, breast mass and discharge.   Genitourinary:  Positive for frequency. Negative for dysuria, genital sores, hematuria, pelvic pain, urgency, vaginal bleeding and vaginal discharge.   Musculoskeletal:  Positive for arthralgias and myalgias. Negative for joint swelling.   Skin:  Positive for rash.   Neurological:  Positive for weakness. Negative for dizziness, headaches and paresthesias.   Psychiatric/Behavioral:  Negative for mood changes. The patient is not nervous/anxious.          OBJECTIVE:   /60 (BP Location: Left arm, Patient Position: Sitting, Cuff Size: Adult Large)   Pulse 75   Resp 20   Ht 1.5 m (4' 11.06\")   Wt 81.2 kg (179 lb)   SpO2 98%   BMI 36.09 kg/m   Estimated body mass index is 36.09 kg/m  as calculated from the following:    Height as of this encounter: 1.5 m (4' 11.06\").    Weight as of this encounter: 81.2 kg (179 lb).  Physical Exam  General Appearance:  Alert, cooperative, no distress, appears stated age.  Head:  Normocephalic, without obvious abnormality, atraumatic.  Eyes:  Conjunctivae/corneas clear, extraocular movements intact both eyes.  Lungs:  Clear to auscultation bilaterally, respirations unlabored.  Heart:  Regular rate and rhythm, S1 and S2 normal, no murmur, rub or gallop.  Abdomen:  Soft, non-tender, bowel sounds active all four quadrants.   Extremities:  Atraumatic, no cyanosis or edema.  Skin:  Skin color, texture, turgor normal, no rashes or lesions.  Neurologic: No focal deficits.          ASSESSMENT / PLAN:   Ruiz was seen today for wellness visit, cough and derm problem.    Diagnoses and all orders for this visit:    Encounter for Medicare " annual wellness exam  -     PRIMARY CARE FOLLOW-UP SCHEDULING; Future    Type 2 diabetes mellitus with hyperglycemia, without long-term current use of insulin (H)  Chronic, borderline controlled.  Continue Invokana and metformin.  Recheck today.  -     Albumin Random Urine Quantitative with Creat Ratio; Future  -     Hemoglobin A1c; Future  -     Adult Eye  Referral; Future  -     Hemoglobin A1c  -     Albumin Random Urine Quantitative with Creat Ratio    Pure hypercholesterolemia  Chronic, stable.  -     atorvastatin (LIPITOR) 10 MG tablet; Take 1 tablet (10 mg) by mouth daily    Chronic pain syndrome  Chronic bilateral low back pain without sciatica  Bilateral chronic knee pain  Impaired mobility  -     DULoxetine (CYMBALTA) 60 MG capsule; Take 1 capsule (60 mg) by mouth daily  -     Cane Order for DME - ONLY FOR DME    Class 2 severe obesity with serious comorbidity and body mass index (BMI) of 36.0 to 36.9 in adult, unspecified obesity type (H)  - discussed Lifestyle modifications, including: Increasing intake of vegetables and fruits, decreasing intake of processed foods/carbohydrates/sugars, having regular physical activity, and losing weight.    Wheezing  PFT in 2009 showing 15% reversibility, although concerned about whether this is accurate.  Patient reports relief with bronchodilators.  Has been uncontrolled with albuterol alone.  Will switch to Symbicort every 4 as needed.  -     guaiFENesin (MUCINEX) 600 MG 12 hr tablet; Take 2 tablets (1,200 mg) by mouth 2 times daily  -     budesonide-formoterol (SYMBICORT) 80-4.5 MCG/ACT Inhaler; Inhale 2 puffs into the lungs every 4 hours as needed (SOB, wheezing)    Moderate episode of recurrent major depressive disorder (H)  Chronic, stable for patient.  Denies SI or HI.  Continue current management.  -     DULoxetine (CYMBALTA) 60 MG capsule; Take 1 capsule (60 mg) by mouth daily    Needs flu shot  -     INFLUENZA VACCINE 18-64Y (FLUBLOK)    Need for  "COVID-19 vaccine  -     COVID-19 12+ (2023-24) (PFIZER)    Need for prophylactic vaccination against Streptococcus pneumoniae (pneumococcus)  -     PNEUMOCOCCAL 20 VALENT CONJUGATE (PREVNAR 20)    Other orders  -     REVIEW OF HEALTH MAINTENANCE PROTOCOL ORDERS        Patient has been advised of split billing requirements and indicates understanding: Yes      COUNSELING:  Reviewed preventive health counseling, as reflected in patient instructions      BMI:   Estimated body mass index is 36.09 kg/m  as calculated from the following:    Height as of this encounter: 1.5 m (4' 11.06\").    Weight as of this encounter: 81.2 kg (179 lb).   Weight management plan: Discussed healthy diet and exercise guidelines      She reports that she has never smoked. She has never used smokeless tobacco.      Appropriate preventive services were discussed with this patient, including applicable screening as appropriate for fall prevention, nutrition, physical activity, Tobacco-use cessation, weight loss and cognition.  Checklist reviewing preventive services available has been given to the patient.    Reviewed patients plan of care and provided an AVS. The Intermediate Care Plan ( asthma action plan, low back pain action plan, and migraine action plan) for Ruiz meets the Care Plan requirement. This Care Plan has been established and reviewed with the Patient.          Alea Kendrick MD  [facility]    Identified Health Risks:  I have reviewed Opioid Use Disorder and Substance Use Disorder risk factors and made any needed referrals.   Answers submitted by the patient for this visit:  Patient Health Questionnaire (Submitted on 12/22/2023)  If you checked off any problems, how difficult have these problems made it for you to do your work, take care of things at home, or get along with other people?: Somewhat difficult  PHQ9 TOTAL SCORE: 11    "

## 2023-12-22 NOTE — PATIENT INSTRUCTIONS
Patient Education     Check with your pharmacy regarding getting the hepatitis B vaccine and shingles vaccine, as Medicare will not cover these in clinic, however, they will cover it if you get at the pharmacy.    Personalized Prevention Plan  You are due for the preventive services outlined below.  Your care team is available to assist you in scheduling these services.  If you have already completed any of these items, please share that information with your care team to update in your medical record.  Health Maintenance Due   Topic Date Due    Depression Action Plan  Never done    Hepatitis B Vaccine (1 of 3 - 3-dose series) Never done    Zoster (Shingles) Vaccine (1 of 2) Never done    HPV Screening  10/23/2020    PAP Smear  10/23/2020    Colorectal Cancer Screening  01/22/2022    Pneumococcal Vaccine (2 of 2 - PCV) 01/29/2022    Mammogram  01/13/2023    Diptheria Tetanus Pertussis (DTAP/TDAP/TD) Vaccine (7 - Td or Tdap) 05/30/2023    Kidney Microalbumin Urine Test  06/08/2023    Diabetic Foot Exam  06/08/2023    ANNUAL REVIEW OF HM ORDERS  06/08/2023    Asthma Action Plan - yearly  06/08/2023    A1C Lab  06/30/2023    Depression Assessment  06/30/2023    Eye Exam  08/11/2023    Flu Vaccine (1) 09/01/2023    COVID-19 Vaccine (5 - 2023-24 season) 09/01/2023    Asthma Control Test  09/30/2023

## 2023-12-22 NOTE — Clinical Note
Pt reports that she completed FIT test from [facility] last month. Has not gotten results yet as far she knows. Can we find these results?

## 2023-12-26 ENCOUNTER — TELEPHONE (OUTPATIENT)
Dept: FAMILY MEDICINE | Facility: CLINIC | Age: 58
End: 2023-12-26
Payer: MEDICARE

## 2023-12-26 NOTE — TELEPHONE ENCOUNTER
----- Message from Alea Kendrick MD sent at 12/24/2023 10:05 PM CST -----  Diabetes is stable. Continue Lifestyle modifications, including: Increasing intake of vegetables and fruits, decreasing intake of processed foods/carbohydrates/sugars, having regular physical activity.     Urine screening for kidney damage was normal

## 2023-12-27 ENCOUNTER — APPOINTMENT (OUTPATIENT)
Dept: INTERPRETER SERVICES | Facility: CLINIC | Age: 58
End: 2023-12-27
Payer: MEDICARE

## 2023-12-29 ENCOUNTER — TRANSFERRED RECORDS (OUTPATIENT)
Dept: HEALTH INFORMATION MANAGEMENT | Facility: CLINIC | Age: 58
End: 2023-12-29
Payer: MEDICARE

## 2023-12-29 PROBLEM — R06.2 WHEEZING: Status: ACTIVE | Noted: 2023-12-29

## 2023-12-29 PROBLEM — G89.4 CHRONIC PAIN SYNDROME: Status: ACTIVE | Noted: 2022-03-23

## 2023-12-29 PROBLEM — G89.29 OTHER CHRONIC PAIN: Status: RESOLVED | Noted: 2022-03-23 | Resolved: 2023-12-29

## 2024-01-01 ENCOUNTER — TRANSFERRED RECORDS (OUTPATIENT)
Dept: MULTI SPECIALTY CLINIC | Facility: CLINIC | Age: 59
End: 2024-01-01

## 2024-01-01 LAB — RETINOPATHY: NORMAL

## 2024-01-05 ENCOUNTER — HOSPITAL ENCOUNTER (OUTPATIENT)
Dept: GENERAL RADIOLOGY | Facility: HOSPITAL | Age: 59
Discharge: HOME | End: 2024-01-05
Attending: FAMILY MEDICINE
Payer: MEDICARE

## 2024-01-05 ENCOUNTER — OFFICE VISIT (OUTPATIENT)
Dept: PEDIATRICS | Facility: CLINIC | Age: 59
End: 2024-01-05
Payer: MEDICARE

## 2024-01-05 ENCOUNTER — OFFICE VISIT (OUTPATIENT)
Dept: FAMILY MEDICINE | Facility: CLINIC | Age: 59
End: 2024-01-05
Payer: MEDICARE

## 2024-01-05 VITALS
RESPIRATION RATE: 20 BRPM | TEMPERATURE: 97.6 F | HEART RATE: 81 BPM | OXYGEN SATURATION: 97 % | SYSTOLIC BLOOD PRESSURE: 112 MMHG | DIASTOLIC BLOOD PRESSURE: 81 MMHG

## 2024-01-05 VITALS
BODY MASS INDEX: 35.93 KG/M2 | WEIGHT: 178.2 LBS | RESPIRATION RATE: 20 BRPM | SYSTOLIC BLOOD PRESSURE: 112 MMHG | OXYGEN SATURATION: 97 % | DIASTOLIC BLOOD PRESSURE: 81 MMHG | HEART RATE: 81 BPM | TEMPERATURE: 97.6 F

## 2024-01-05 DIAGNOSIS — M54.6 ACUTE RIGHT-SIDED THORACIC BACK PAIN: ICD-10-CM

## 2024-01-05 DIAGNOSIS — R10.84 ABDOMINAL PAIN, GENERALIZED: ICD-10-CM

## 2024-01-05 DIAGNOSIS — R74.8 ELEVATED LIPASE: ICD-10-CM

## 2024-01-05 DIAGNOSIS — R14.0 ABDOMINAL BLOATING: ICD-10-CM

## 2024-01-05 DIAGNOSIS — G58.9 PINCHED NERVE: ICD-10-CM

## 2024-01-05 DIAGNOSIS — R10.11 ABDOMINAL PAIN, RIGHT UPPER QUADRANT: Primary | ICD-10-CM

## 2024-01-05 DIAGNOSIS — M54.6 ACUTE RIGHT-SIDED THORACIC BACK PAIN: Primary | ICD-10-CM

## 2024-01-05 DIAGNOSIS — R05.2 SUBACUTE COUGH: ICD-10-CM

## 2024-01-05 LAB
ALBUMIN SERPL-MCNC: 3.5 G/DL (ref 3.4–5)
ALBUMIN UR-MCNC: NEGATIVE MG/DL
ALP SERPL-CCNC: 85 U/L (ref 40–150)
ALT SERPL W P-5'-P-CCNC: 57 U/L (ref 0–50)
ANION GAP SERPL CALCULATED.3IONS-SCNC: 12 MMOL/L (ref 3–14)
APPEARANCE UR: CLEAR
AST SERPL W P-5'-P-CCNC: 35 U/L (ref 0–45)
BACTERIA #/AREA URNS HPF: ABNORMAL /HPF
BASOPHILS # BLD AUTO: 0.1 10E3/UL (ref 0–0.2)
BASOPHILS NFR BLD AUTO: 1 %
BILIRUB SERPL-MCNC: 1.1 MG/DL (ref 0.2–1.3)
BILIRUB UR QL STRIP: NEGATIVE
BUN SERPL-MCNC: 12 MG/DL (ref 7–30)
CALCIUM SERPL-MCNC: 9.3 MG/DL (ref 8.5–10.1)
CHLORIDE BLD-SCNC: 103 MMOL/L (ref 94–109)
CO2 SERPL-SCNC: 27 MMOL/L (ref 20–32)
COLOR UR AUTO: YELLOW
CREAT SERPL-MCNC: 0.5 MG/DL (ref 0.52–1.04)
EGFRCR SERPLBLD CKD-EPI 2021: >90 ML/MIN/1.73M2
EOSINOPHIL # BLD AUTO: 0.1 10E3/UL (ref 0–0.7)
EOSINOPHIL NFR BLD AUTO: 1 %
ERYTHROCYTE [DISTWIDTH] IN BLOOD BY AUTOMATED COUNT: 13.3 % (ref 10–15)
GLUCOSE BLD-MCNC: 140 MG/DL (ref 70–99)
GLUCOSE UR STRIP-MCNC: NEGATIVE MG/DL
HCT VFR BLD AUTO: 43.1 % (ref 35–47)
HGB BLD-MCNC: 14.7 G/DL (ref 11.7–15.7)
HGB UR QL STRIP: NEGATIVE
IMM GRANULOCYTES # BLD: 0.1 10E3/UL
IMM GRANULOCYTES NFR BLD: 1 %
KETONES UR STRIP-MCNC: NEGATIVE MG/DL
LEUKOCYTE ESTERASE UR QL STRIP: NEGATIVE
LIPASE SERPL-CCNC: 90 U/L (ref 13–60)
LYMPHOCYTES # BLD AUTO: 3.1 10E3/UL (ref 0.8–5.3)
LYMPHOCYTES NFR BLD AUTO: 33 %
MCH RBC QN AUTO: 31.5 PG (ref 26.5–33)
MCHC RBC AUTO-ENTMCNC: 34.1 G/DL (ref 31.5–36.5)
MCV RBC AUTO: 92 FL (ref 78–100)
MONOCYTES # BLD AUTO: 0.6 10E3/UL (ref 0–1.3)
MONOCYTES NFR BLD AUTO: 7 %
MUCOUS THREADS #/AREA URNS LPF: PRESENT /LPF
NEUTROPHILS # BLD AUTO: 5.5 10E3/UL (ref 1.6–8.3)
NEUTROPHILS NFR BLD AUTO: 59 %
NITRATE UR QL: NEGATIVE
PH UR STRIP: 6 [PH] (ref 5–8)
PLATELET # BLD AUTO: 241 10E3/UL (ref 150–450)
POTASSIUM BLD-SCNC: 4.7 MMOL/L (ref 3.4–5.3)
PROT SERPL-MCNC: 7.3 G/DL (ref 6.8–8.8)
RBC # BLD AUTO: 4.67 10E6/UL (ref 3.8–5.2)
RBC #/AREA URNS AUTO: ABNORMAL /HPF
SODIUM SERPL-SCNC: 142 MMOL/L (ref 135–145)
SP GR UR STRIP: 1.01 (ref 1–1.03)
SQUAMOUS #/AREA URNS AUTO: ABNORMAL /LPF
UROBILINOGEN UR STRIP-ACNC: 0.2 E.U./DL
WBC # BLD AUTO: 9.4 10E3/UL (ref 4–11)
WBC #/AREA URNS AUTO: ABNORMAL /HPF

## 2024-01-05 PROCEDURE — 71046 X-RAY EXAM CHEST 2 VIEWS: CPT

## 2024-01-05 PROCEDURE — 99214 OFFICE O/P EST MOD 30 MIN: CPT | Performed by: FAMILY MEDICINE

## 2024-01-05 PROCEDURE — 81001 URINALYSIS AUTO W/SCOPE: CPT | Performed by: FAMILY MEDICINE

## 2024-01-05 PROCEDURE — 72070 X-RAY EXAM THORAC SPINE 2VWS: CPT

## 2024-01-05 PROCEDURE — 85025 COMPLETE CBC W/AUTO DIFF WBC: CPT | Performed by: FAMILY MEDICINE

## 2024-01-05 PROCEDURE — 80053 COMPREHEN METABOLIC PANEL: CPT | Performed by: FAMILY MEDICINE

## 2024-01-05 PROCEDURE — 83690 ASSAY OF LIPASE: CPT | Performed by: FAMILY MEDICINE

## 2024-01-05 PROCEDURE — 36415 COLL VENOUS BLD VENIPUNCTURE: CPT | Performed by: FAMILY MEDICINE

## 2024-01-05 PROCEDURE — 99207 REFERRAL TO ACUTE AND DIAGNOSTIC SERVICES: CPT | Performed by: FAMILY MEDICINE

## 2024-01-05 RX ORDER — BENZONATATE 100 MG/1
100 CAPSULE ORAL 3 TIMES DAILY PRN
Qty: 30 CAPSULE | Refills: 0 | Status: SHIPPED | OUTPATIENT
Start: 2024-01-05

## 2024-01-05 RX ORDER — GABAPENTIN 100 MG/1
CAPSULE ORAL
Qty: 81 CAPSULE | Refills: 0 | Status: SHIPPED | OUTPATIENT
Start: 2024-01-05 | End: 2024-01-12

## 2024-01-05 ASSESSMENT — PAIN SCALES - GENERAL: PAINLEVEL: EXTREME PAIN (8)

## 2024-01-05 NOTE — Clinical Note
Patient was seen at the [facility] today.  Would love for patient to see you in follow-up within the month, if possible.  Thank you!

## 2024-01-05 NOTE — PROGRESS NOTES
Assessment & Plan     Acute right-sided thoracic back pain  The patient is describing sounds more like a nerve pain or a pinched nerve.  Will have her try gabapentin and we will have her ramp up slowly to 100 mg 3 times daily  Follow-up with primary before the end of the month to have a recheck  - XR Thoracic Spine 2 Views; Future  - gabapentin (NEURONTIN) 100 MG capsule; Take 1 capsule (100 mg) by mouth daily for 3 days, THEN 1 capsule (100 mg) 2 times daily for 3 days, THEN 1 capsule (100 mg) 3 times daily for 24 days.    Subacute cough  Will try Tessalon Perles every 8 hours as needed  - benzonatate (TESSALON) 100 MG capsule; Take 1 capsule (100 mg) by mouth 3 times daily as needed for cough  - XR Chest 2 Views; Future    Abdominal pain, generalized  Her lipase is little bit elevated and she does get some bloating with eating so I will have her try more of a liquid or soft diet to see if she sees improvement but she did not have any tenderness to palpation of the abdomen and so I do not feel that she has pancreatitis overtly.  - XR Chest 2 Views; Future  - XR Thoracic Spine 2 Views; Future  - CBC with platelets differential; Future  - Comprehensive metabolic panel; Future  - Lipase; Future  - UA with Microscopic reflex to Culture; Future  - UA with Microscopic reflex to Culture  - Lipase  - Comprehensive metabolic panel  - CBC with platelets differential  - UA Microscopic with Reflex to Culture    Abdominal bloating  See above  - XR Chest 2 Views; Future  - XR Thoracic Spine 2 Views; Future  - CBC with platelets differential; Future  - Comprehensive metabolic panel; Future  - Lipase; Future  - UA with Microscopic reflex to Culture; Future  - UA with Microscopic reflex to Culture  - Lipase  - Comprehensive metabolic panel  - CBC with platelets differential  - UA Microscopic with Reflex to Culture    Pinched nerve  See above  - gabapentin (NEURONTIN) 100 MG capsule; Take 1 capsule (100 mg) by mouth daily for 3  days, THEN 1 capsule (100 mg) 2 times daily for 3 days, THEN 1 capsule (100 mg) 3 times daily for 24 days.    Elevated lipase  See above                 Return in about 4 weeks (around 2/2/2024) for Follow up, with primary provider.    Miryam Walker MD  [facility] WILMAN Melchor is a 58 year old, presenting for the following health issues:  Abdominal Pain (RUQ), Chest Pain (Both sides of the ribs), and Flank Pain (Right side flank pain)      HPI     Abdominal/Flank Pain  Onset/Duration: 2 weeks  Description:   Character: Like a claw clutching  Location: right flank  Radiation: None and Back  Intensity: moderate  Progression of Symptoms:  same  Accompanying Signs & Symptoms:  Fever/chills: no   Gas/Bloating: YES  Nausea: no   Vomitting: no   Diarrhea: no   Constipation:YES  Dysuria: no            Hematuria: no            Frequency: no            Incontinence of urine: no   She does have nocturia about 3 times per night.  History:            Last bowel movement: yesterday  Trauma: no   Previous similar pain: no    Previous tests done: none  Precipitating factors:   Does the pain change with:     Food: Yes-increased abdominal bloating    Bowel Movement: no     Urination: no              Other factors: no   Therapies tried and outcome:  None    When food last eaten: This morning    Patient is a 58-year-old female who is here with her daughters who are helping to interpret.  She has had right-sided flank pain that has been there for 2 weeks.  She does not do any lifting and she has not had any trauma or fall.  She has a history of low back pain.  She has had a cough for 1 to 2 months which feels like a dry tickle cough.  She has used over-the-counter cough medicines and they are not working.  She has no fevers or chills.  The cough does sometimes have production.  She has right-sided flank pain that feels like there is a claw clutching the area.  That has been there for about 2 weeks.  It  sometimes radiates to her back or abdomen.  No history of kidney stones.  No blood in the urine.  Eating gives her some abdominal bloating.  She has some pain under the ribs on both sides.  She has no chest pain or shortness of breath.  She does have some constipation but no diarrhea, nausea, vomiting or blood in the stool.  No dysuria, hematuria, urinary urgency or frequency.  She does have nocturia about 3 times per night.      Review of Systems   Negative except as listed in HPI      Objective    /81   Pulse 81   Temp 97.6  F (36.4  C) (Oral)   Resp 20   SpO2 97%   There is no height or weight on file to calculate BMI.  Physical Exam   Vitals noted and within normal limits  In general she is alert, oriented, and in no acute distress  No tenderness to palpation of the thoracic or lumbar spine or paraspinal muscles bilaterally  Heart regular rate and rhythm with no murmurs  Lungs clear to auscultation bilaterally with good air entry and a couple scattered wheezes  Back with no CVA tenderness  Abdomen has normal bowel sounds and is soft, nondistended and nontender  The right side of the back and abdomen have no rash.  Thoracic x-ray with no fractures.  Some arthritis noted.  Chest x-ray is clear  Urine is within normal limits - no sign of infection  CBC within normal limits  CMP within normal limits except for ALT increased to 57  Lipase elevated to 90  Results for orders placed or performed during the hospital encounter of 01/05/24   XR Thoracic Spine 2 Views     Status: None    Narrative    EXAM: XR THORACIC SPINE 2 VIEWS  LOCATION: [facility]  DATE: 1/5/2024    INDICATION:  Acute right-sided thoracic back pain, Abdominal pain, generalized, Abdominal bloating  COMPARISON: Chest radiograph 10/12/2022      Impression    IMPRESSION: Normal vertebral body heights. Negative for fracture. Mild disc degenerative changes in the mid and lower T-spine with ventral and lateral osteophytes.  Negative for sagittal subluxation.    Results for orders placed or performed during the hospital encounter of 01/05/24   XR Chest 2 Views     Status: None    Narrative    EXAM: XR CHEST 2 VIEWS  LOCATION: [facility]  DATE: 1/5/2024    INDICATION:  Subacute cough, Abdominal pain, generalized, Abdominal bloating  COMPARISON: 10/12/2022      Impression    IMPRESSION: Negative chest.   Results for orders placed or performed in visit on 01/05/24   UA with Microscopic reflex to Culture     Status: Normal    Specimen: Urine, Clean Catch   Result Value Ref Range    Color Urine Yellow Colorless, Straw, Light Yellow, Yellow    Appearance Urine Clear Clear    Glucose Urine Negative Negative mg/dL    Bilirubin Urine Negative Negative    Ketones Urine Negative Negative mg/dL    Specific Gravity Urine 1.015 1.005 - 1.030    Blood Urine Negative Negative    pH Urine 6.0 5.0 - 8.0    Protein Albumin Urine Negative Negative mg/dL    Urobilinogen Urine 0.2 0.2, 1.0 E.U./dL    Nitrite Urine Negative Negative    Leukocyte Esterase Urine Negative Negative   Lipase     Status: Abnormal   Result Value Ref Range    Lipase 90 (H) 13 - 60 U/L   Comprehensive metabolic panel     Status: Abnormal   Result Value Ref Range    Sodium 142 135 - 145 mmol/L    Potassium 4.7 3.4 - 5.3 mmol/L    Chloride 103 94 - 109 mmol/L    Carbon Dioxide (CO2) 27 20 - 32 mmol/L    Anion Gap 12 3 - 14 mmol/L    Urea Nitrogen 12 7 - 30 mg/dL    Creatinine 0.50 (L) 0.52 - 1.04 mg/dL    GFR Estimate >90 >60 mL/min/1.73m2    Calcium 9.3 8.5 - 10.1 mg/dL    Glucose 140 (H) 70 - 99 mg/dL    Alkaline Phosphatase 85 40 - 150 U/L    AST 35 0 - 45 U/L    ALT 57 (H) 0 - 50 U/L    Protein Total 7.3 6.8 - 8.8 g/dL    Albumin 3.5 3.4 - 5.0 g/dL    Bilirubin Total 1.1 0.2 - 1.3 mg/dL   CBC with platelets and differential     Status: None   Result Value Ref Range    WBC Count 9.4 4.0 - 11.0 10e3/uL    RBC Count 4.67 3.80 - 5.20 10e6/uL    Hemoglobin 14.7  11.7 - 15.7 g/dL    Hematocrit 43.1 35.0 - 47.0 %    MCV 92 78 - 100 fL    MCH 31.5 26.5 - 33.0 pg    MCHC 34.1 31.5 - 36.5 g/dL    RDW 13.3 10.0 - 15.0 %    Platelet Count 241 150 - 450 10e3/uL    % Neutrophils 59 %    % Lymphocytes 33 %    % Monocytes 7 %    % Eosinophils 1 %    % Basophils 1 %    % Immature Granulocytes 1 %    Absolute Neutrophils 5.5 1.6 - 8.3 10e3/uL    Absolute Lymphocytes 3.1 0.8 - 5.3 10e3/uL    Absolute Monocytes 0.6 0.0 - 1.3 10e3/uL    Absolute Eosinophils 0.1 0.0 - 0.7 10e3/uL    Absolute Basophils 0.1 0.0 - 0.2 10e3/uL    Absolute Immature Granulocytes 0.1 <=0.4 10e3/uL   UA Microscopic with Reflex to Culture     Status: Abnormal   Result Value Ref Range    Bacteria Urine Few (A) None Seen /HPF    RBC Urine None Seen 0-2 /HPF /HPF    WBC Urine 0-5 0-5 /HPF /HPF    Squamous Epithelials Urine Few (A) None Seen /LPF    Mucus Urine Present (A) None Seen /LPF    Narrative    Urine Culture not indicated   CBC with platelets differential     Status: None    Narrative    The following orders were created for panel order CBC with platelets differential.  Procedure                               Abnormality         Status                     ---------                               -----------         ------                     CBC with platelets and d...[[id number]]                      Final result                 Please view results for these tests on the individual orders.

## 2024-01-05 NOTE — PROGRESS NOTES
Assessment & Plan     Abdominal pain, right upper quadrant  Disucssed [facility] ADS transferred to them for futher workup.        [id number]    No follow-ups on file.    Dilip Regalado MD  [facility]    ------------------------------------------------------------------------  Subjective     Ruiz Delacruz presents to clinic today for the following health issues:  chief complaint  HPI  2 weeks of right flank pain radiationgaround to right upper abdomen. Associated with decreased appetite and some bloating but no nausea or vomiting nor stool changes. No rash. She does not some numbness/tingling ant abd wall.     No injury nor overuse. No similar problems in the past.       Review of Systems  No weight loss. No skin changes  no new meds. Some chonic cough without shortness of breath. No other cv nor resp nor gi/gu symptoms       Objective    /81   Pulse 81   Temp 97.6  F (36.4  C) (Oral)   Resp 20   Wt 80.8 kg (178 lb 3.2 oz)   SpO2 97%   BMI 35.93 kg/m    Physical Exam   GENERAL: healthy, alert and no distress  EYES: Eyes grossly normal to inspection  HENT: ear canals and TM's normal, nose and mouth without ulcers or lesions  NECK: no adenopathy, no asymmetry, masses, or scars and thyroid normal to palpation  RESP: a few bilaterally creackles. Otherwise normal.   CV: regular rate and rhythm, normal S1 S2, no S3 or S4, no murmur, click or rub, no peripheral edema  ABDOMEN: soft, nontender, no hepatosplenomegaly, no masses and bowel sounds normal  MS: no gross musculoskeletal defects noted, no edema.no vertebral body tenderness to palpation  there is some rib pain on the right side.   SKIN: no suspicious lesions or rashes

## 2024-01-08 ENCOUNTER — TELEPHONE (OUTPATIENT)
Dept: FAMILY MEDICINE | Facility: CLINIC | Age: 59
End: 2024-01-08
Payer: MEDICARE

## 2024-01-08 NOTE — TELEPHONE ENCOUNTER
RN attempted to contact patient using an , but no answer.  left message on patient's voice mail to call clinic back.    If patient calls back please offer appointment for back pain and schedule. Thanks    Kelli Gracia RN  [facility]    Alea Kendrick MD  [facility]  Please call pt to offer appt for follow up of back pain.

## 2024-01-09 NOTE — TELEPHONE ENCOUNTER
RN made 2nd attempt to contact patient using an , but no answer.  left message on patient's voice mail to call clinic back.    If patient calls back please offer appointment for back pain and schedule. Thanks    Kelli Gracia RN  [facility]    Alea Kendrick MD  [facility]  Please call pt to offer appt for follow up of back pain.

## 2024-01-10 ENCOUNTER — APPOINTMENT (OUTPATIENT)
Dept: INTERPRETER SERVICES | Facility: CLINIC | Age: 59
End: 2024-01-10
Payer: MEDICARE

## 2024-01-10 NOTE — TELEPHONE ENCOUNTER
Appointment scheduled on 1/12/24 with Dr Kendrick for back pain.  No further action needed.    Kelli Gracia RN  [facility]

## 2024-01-12 ENCOUNTER — OFFICE VISIT (OUTPATIENT)
Dept: FAMILY MEDICINE | Facility: CLINIC | Age: 59
End: 2024-01-12
Payer: MEDICARE

## 2024-01-12 ENCOUNTER — LAB (OUTPATIENT)
Dept: LAB | Facility: CLINIC | Age: 59
End: 2024-01-12
Payer: MEDICARE

## 2024-01-12 VITALS
WEIGHT: 183 LBS | HEIGHT: 59 IN | OXYGEN SATURATION: 97 % | BODY MASS INDEX: 36.89 KG/M2 | TEMPERATURE: 98.3 F | DIASTOLIC BLOOD PRESSURE: 77 MMHG | RESPIRATION RATE: 16 BRPM | SYSTOLIC BLOOD PRESSURE: 118 MMHG | HEART RATE: 65 BPM

## 2024-01-12 DIAGNOSIS — M54.6 ACUTE RIGHT-SIDED THORACIC BACK PAIN: Primary | ICD-10-CM

## 2024-01-12 DIAGNOSIS — Z23 NEED FOR SHINGLES VACCINE: ICD-10-CM

## 2024-01-12 DIAGNOSIS — Z12.11 SCREEN FOR COLON CANCER: ICD-10-CM

## 2024-01-12 DIAGNOSIS — E11.65 TYPE 2 DIABETES MELLITUS WITH HYPERGLYCEMIA, WITHOUT LONG-TERM CURRENT USE OF INSULIN (H): ICD-10-CM

## 2024-01-12 DIAGNOSIS — Z12.11 SCREEN FOR COLON CANCER: Primary | ICD-10-CM

## 2024-01-12 DIAGNOSIS — R10.13 EPIGASTRIC PAIN: ICD-10-CM

## 2024-01-12 DIAGNOSIS — K59.00 CONSTIPATION, UNSPECIFIED CONSTIPATION TYPE: ICD-10-CM

## 2024-01-12 DIAGNOSIS — Z23 NEED FOR PROPHYLACTIC VACCINATION AGAINST HEPATITIS B VIRUS: ICD-10-CM

## 2024-01-12 DIAGNOSIS — Z12.4 CERVICAL CANCER SCREENING: ICD-10-CM

## 2024-01-12 DIAGNOSIS — E66.01 MORBID OBESITY (H): ICD-10-CM

## 2024-01-12 PROCEDURE — 99214 OFFICE O/P EST MOD 30 MIN: CPT | Performed by: STUDENT IN AN ORGANIZED HEALTH CARE EDUCATION/TRAINING PROGRAM

## 2024-01-12 PROCEDURE — G0145 SCR C/V CYTO,THINLAYER,RESCR: HCPCS | Performed by: STUDENT IN AN ORGANIZED HEALTH CARE EDUCATION/TRAINING PROGRAM

## 2024-01-12 PROCEDURE — 87624 HPV HI-RISK TYP POOLED RSLT: CPT | Performed by: STUDENT IN AN ORGANIZED HEALTH CARE EDUCATION/TRAINING PROGRAM

## 2024-01-12 RX ORDER — POLYETHYLENE GLYCOL 3350 17 G/17G
1 POWDER, FOR SOLUTION ORAL DAILY
Qty: 289 G | Refills: 1 | Status: SHIPPED | OUTPATIENT
Start: 2024-01-12

## 2024-01-12 NOTE — PROGRESS NOTES
Assessment & Plan     Acute right-sided thoracic back pain  Patient reports that she was not able to  gabapentin due to insurance not covering it.  Reports that the pain has improved and is no longer concerned about it.    Epigastric pain  Reports bilateral epigastric pain, feels like a tightness.  Not related to food intake.  Does have constipation.  Lipase and CMP were normal at urgent care.  Normal abdominal exam today.  Will test for H. Pylori.  Treat constipation with MiraLAX as noted below.    - If H. pylori is negative and pain is still persistent next visit, will consider treating for GERD/gastritis and ultrasound to rule out biliary colic.  - Helicobacter pylori Antigen Stool    Constipation, unspecified constipation type  - polyethylene glycol (MIRALAX) 17 GM/Dose powder  Dispense: 289 g; Refill: 1    Morbid obesity (H)  - Lifestyle modifications, including: Increasing intake of vegetables and fruits, decreasing intake of processed foods/carbohydrates/sugars, having regular physical activity, and losing weight.    Type 2 diabetes mellitus with hyperglycemia, without long-term current use of insulin (H)  Previously well-controlled.  Will continue current management for now.  Recheck today.    Cervical cancer screening  - Pap Screen with HPV - recommended age 30 - 65 years    Screen for colon cancer  - Fecal colorectal cancer screen FIT - Future (S+30)    Need for shingles vaccine  Need for prophylactic vaccination against hepatitis B virus  Will need to obtain at pharmacy.        Alea Kendrick MD  [facility]    Delvin Melchor is a 58 year old, presenting for the following health issues:  office visit (Back pain)        1/12/2024    11:20 AM   Additional Questions   Roomed by    Accompanied by self       History of Present Illness       Reason for visit:  Back pain    She eats 0-1 servings of fruits and vegetables daily.She consumes 0 sweetened beverage(s) daily.She  "exercises with enough effort to increase her heart rate 9 or less minutes per day.  She exercises with enough effort to increase her heart rate 3 or less days per week.   She is taking medications regularly.         Review of Systems   Constitutional: Negative for fever and chills.   Respiratory: Negative for cough or shortness of breath.    Cardiovascular: Negative for chest pain or chest pressure.   Gastrointestinal: Negative for nausea, vomiting, diarrhea .        Objective    /77 (BP Location: Left arm, Patient Position: Sitting, Cuff Size: Adult Regular)   Pulse 65   Temp 98.3  F (36.8  C) (Temporal)   Resp 16   Ht 1.5 m (4' 11.06\")   Wt 83 kg (183 lb)   LMP  (LMP Unknown)   SpO2 97%   BMI 36.89 kg/m    Body mass index is 36.89 kg/m .  Physical Exam   General Appearance:  Alert, cooperative, no distress, appears stated age.  Head:  Normocephalic, without obvious abnormality, atraumatic.  Eyes:  Conjunctivae/corneas clear, extraocular movements intact both eyes.  Abdomen:  Soft, non-tender, bowel sounds active all four quadrants.   Extremities:  Atraumatic, no cyanosis or edema.  Skin:  Skin color, texture, turgor normal, no rashes or lesions.  Neurologic: No focal deficits.  GYN  Normal urethra, Labia majora and Labia minora, Vagina is normal in appearance without discharge. Anus/perineum normal  Speculum exam: normal cervix, no discharge          Prior to immunization administration, verified patients identity using patient s name and date of birth. Please see Immunization Activity for additional information.     Screening Questionnaire for Adult Immunization    Are you sick today?   No   Do you have allergies to medications, food, a vaccine component or latex?   No   Have you ever had a serious reaction after receiving a vaccination?   No   Do you have a long-term health problem with heart, lung, kidney, or metabolic disease (e.g., diabetes), asthma, a blood disorder, no spleen, complement " component deficiency, a cochlear implant, or a spinal fluid leak?  Are you on long-term aspirin therapy?   Yes   Do you have cancer, leukemia, HIV/AIDS, or any other immune system problem?   No   Do you have a parent, brother, or sister with an immune system problem?   No   In the past 3 months, have you taken medications that affect  your immune system, such as prednisone, other steroids, or anticancer drugs; drugs for the treatment of rheumatoid arthritis, Crohn s disease, or psoriasis; or have you had radiation treatments?   No   Have you had a seizure, or a brain or other nervous system problem?   No   During the past year, have you received a transfusion of blood or blood    products, or been given immune (gamma) globulin or antiviral drug?   No   For women: Are you pregnant or is there a chance you could become       pregnant during the next month?   No   Have you received any vaccinations in the past 4 weeks?   No     Immunization questionnaire was positive for at least one answer.  Notified provider.      Patient instructed to remain in clinic for 15 minutes afterwards, and to report any adverse reactions.     Screening performed by Woo Robertson MA on 1/12/2024 at 11:23 AM.

## 2024-01-12 NOTE — PROGRESS NOTES
Acute right-sided thoracic back pain  Pinched Nerve  Was seen in  for back pain 1/5/24, note reviewed:  She has had right-sided flank pain that has been there for 2 weeks. She does not do any lifting and she has not had any trauma or fall. The patient is describing sounds more like a nerve pain or a pinched nerve.  Will have her try gabapentin and we will have her ramp up slowly to 100 mg 3 times daily. Follow-up with primary before the end of the month to have a recheck.  - XR Thoracic Spine: Normal vertebral body heights. Negative for fracture. Mild disc degenerative changes in the mid and lower T-spine with ventral and lateral osteophytes. Negative for sagittal subluxation.   - gabapentin (NEURONTIN) 100 MG capsule; Take 1 capsule (100 mg) by mouth daily for 3 days, THEN 1 capsule (100 mg) 2 times daily for 3 days, THEN 1 capsule (100 mg) 3 times daily for 24 days.  - Today pt reports ***  - Denies red flags, including: Fever, history of IV drug use or malignancy, trauma or injury, loss of bowel or bladder control, saddle anesthesia, numbness, weakness, or tingling of lower extremities.        6/8/2022    11:31 AM 3/31/2023    11:16 AM 12/22/2023     9:36 AM   PHQ   PHQ-9 Total Score 3 5 11   Q9: Thoughts of better off dead/self-harm past 2 weeks Not at all Not at all Not at all         Lab Results   Component Value Date    A1C 8.1 12/22/2023    A1C 7.9 03/31/2023    A1C 8.4 09/13/2022    A1C 9.1 06/08/2022    A1C 7.8 01/13/2021    A1C 5.7 01/02/2015    A1C 5.1 05/30/2013       Chronic pain syndrome  Chronic bilateral low back pain without sciatica  Bilateral chronic knee pain  Impaired mobility  -     DULoxetine (CYMBALTA) 60 MG capsule; Take 1 capsule (60 mg) by mouth daily  -     Cane Order for DME - ONLY FOR DME

## 2024-01-16 ENCOUNTER — HOSPITAL ENCOUNTER (OUTPATIENT)
Dept: GENERAL RADIOLOGY | Facility: HOSPITAL | Age: 59
Discharge: HOME | End: 2024-01-16
Attending: PHYSICIAN ASSISTANT
Payer: MEDICARE

## 2024-01-16 ENCOUNTER — OFFICE VISIT (OUTPATIENT)
Dept: FAMILY MEDICINE | Facility: CLINIC | Age: 59
End: 2024-01-16
Payer: MEDICARE

## 2024-01-16 VITALS
SYSTOLIC BLOOD PRESSURE: 107 MMHG | BODY MASS INDEX: 36.89 KG/M2 | WEIGHT: 183 LBS | DIASTOLIC BLOOD PRESSURE: 74 MMHG | HEART RATE: 93 BPM | TEMPERATURE: 97.9 F | OXYGEN SATURATION: 97 % | RESPIRATION RATE: 18 BRPM

## 2024-01-16 DIAGNOSIS — S59.911A FOREARM INJURY, RIGHT, INITIAL ENCOUNTER: Primary | ICD-10-CM

## 2024-01-16 DIAGNOSIS — S59.911A FOREARM INJURY, RIGHT, INITIAL ENCOUNTER: ICD-10-CM

## 2024-01-16 PROCEDURE — 73090 X-RAY EXAM OF FOREARM: CPT | Mod: RT

## 2024-01-16 PROCEDURE — 99213 OFFICE O/P EST LOW 20 MIN: CPT | Performed by: PHYSICIAN ASSISTANT

## 2024-01-16 PROCEDURE — 73080 X-RAY EXAM OF ELBOW: CPT | Mod: RT

## 2024-01-16 RX ORDER — IBUPROFEN 600 MG/1
600 TABLET, FILM COATED ORAL EVERY 6 HOURS PRN
Qty: 30 TABLET | Refills: 0 | Status: SHIPPED | OUTPATIENT
Start: 2024-01-16

## 2024-01-16 RX ORDER — ACETAMINOPHEN 500 MG
1000 TABLET ORAL EVERY 6 HOURS PRN
Qty: 50 TABLET | Refills: 0 | Status: SHIPPED | OUTPATIENT
Start: 2024-01-16

## 2024-01-16 NOTE — PROGRESS NOTES
Patient presents with:  Fall: On  night, injured whole Rt arm, swollen, pain with lifting and twisting arm      Clinical Decision Making:  X-ray negative for signs of fractures.  Suspect soft tissue injury due to fall.  We discussed supportive cares and reasons to follow-up.      ICD-10-CM    1. Forearm injury, right, initial encounter  S59.911A XR Elbow Right G/E 3 Views     XR Forearm Right 2 Views     acetaminophen (TYLENOL) 500 MG tablet     ibuprofen (ADVIL/MOTRIN) 600 MG tablet          Patient Instructions   Ice 10 min at a time 2-3 times per day for the first 3-4 days. Then switch to heat doing the same thing.   Alternate between Tylenol and Ibuprofen   Follow up if no improvement in 10 days.   Rest, avoid heavy lifting or repetitive movements. I don't recommend complete immobilization.     HPI:  Ruiz Delacruz is a 58 year old female who presents today complaining of right arm injury that occurred 2 days ago from a fall. Patient was trying to catch a child that was jumping down the stairs; she was simultaneously trying to take off her shoes, so she lost her balance and fell and landed with her arm hitting the wall. She feels pain in the elbow and for    History obtained from the patient.    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, Invasive (Non-metastatic-GTD)  2013: History of peptic ulcer  Morbid obesity (H)  Moderate episode of recurrent major depressive disorder (H)  Type 2 diabetes mellitus with hyperglycemia, without long-term   current use of insulin (H)      Past Medical History:   Diagnosis Date    Diabetes mellitus due to underlying condition with hyperglycemia, without long-term current use of insulin (H) 2021    History of hysterectomy, supracervical 06    Molar Pregnancy, Invasive (Non-metastatic-GTD) 2013    Pregnancy            Social History     Tobacco Use    Smoking status: Never     Smokeless tobacco: Never   Substance Use Topics    Alcohol use: No       Review of Systems    Vitals:    01/16/24 1431   BP: 107/74   Pulse: 93   Resp: 18   Temp: 97.9  F (36.6  C)   SpO2: 97%   Weight: 83 kg (183 lb)       Physical Exam  Vitals and nursing note reviewed.   Constitutional:       General: She is not in acute distress.     Appearance: She is not toxic-appearing or diaphoretic.   HENT:      Head: Normocephalic and atraumatic.      Right Ear: External ear normal.      Left Ear: External ear normal.   Eyes:      Conjunctiva/sclera: Conjunctivae normal.   Pulmonary:      Effort: Pulmonary effort is normal. No respiratory distress.   Musculoskeletal:      Comments: No significant deformity or noticeable swelling.  Patient is freely moving the arm and bending the elbow.  Patient has tenderness to palpation over the forearm and elbow, but is very generalized.  No significant wrist or shoulder pain.   Neurological:      Mental Status: She is alert.   Psychiatric:         Mood and Affect: Mood normal.         Behavior: Behavior normal.         Thought Content: Thought content normal.         Judgment: Judgment normal.         Results:  Results for orders placed or performed during the hospital encounter of 01/16/24   XR Forearm Right 2 Views     Status: None    Narrative    EXAM: XR ELBOW RIGHT G/E 3 VIEWS, XR FOREARM RIGHT 2 VIEWS  LOCATION: [facility]  DATE: 1/16/2024    INDICATION: Fall. Forearm and elbow pain.  COMPARISON: None.      Impression    IMPRESSION: Normal joint spaces and alignment. No fracture or joint effusion.   Results for orders placed or performed during the hospital encounter of 01/16/24   XR Elbow Right G/E 3 Views     Status: None    Narrative    EXAM: XR ELBOW RIGHT G/E 3 VIEWS, XR FOREARM RIGHT 2 VIEWS  LOCATION: [facility]  DATE: 1/16/2024    INDICATION: Fall. Forearm and elbow pain.  COMPARISON: None.      Impression    IMPRESSION:  Normal joint spaces and alignment. No fracture or joint effusion.         At the end of the encounter, I discussed results, diagnosis, medications. Discussed red flags for immediate return to clinic/ER, as well as indications for follow up if no improvement. Patient understood and agreed to plan. Patient was stable for discharge.

## 2024-01-16 NOTE — PATIENT INSTRUCTIONS
Ice 10 min at a time 2-3 times per day for the first 3-4 days. Then switch to heat doing the same thing.   Alternate between Tylenol and Ibuprofen   Follow up if no improvement in 10 days.   Rest, avoid heavy lifting or repetitive movements. I don't recommend complete immobilization.

## 2024-01-17 LAB
BKR LAB AP GYN ADEQUACY: NORMAL
BKR LAB AP GYN INTERPRETATION: NORMAL
BKR LAB AP HPV REFLEX: NORMAL
BKR LAB AP PREVIOUS ABNORMAL: NORMAL
PATH REPORT.COMMENTS IMP SPEC: NORMAL
PATH REPORT.COMMENTS IMP SPEC: NORMAL
PATH REPORT.RELEVANT HX SPEC: NORMAL

## 2024-01-18 LAB
HUMAN PAPILLOMA VIRUS 16 DNA: NEGATIVE
HUMAN PAPILLOMA VIRUS 18 DNA: NEGATIVE
HUMAN PAPILLOMA VIRUS FINAL DIAGNOSIS: NORMAL
HUMAN PAPILLOMA VIRUS OTHER HR: NEGATIVE

## 2024-01-19 ENCOUNTER — LAB (OUTPATIENT)
Dept: LAB | Facility: CLINIC | Age: 59
End: 2024-01-19
Payer: MEDICARE

## 2024-01-19 DIAGNOSIS — R10.13 EPIGASTRIC PAIN: ICD-10-CM

## 2024-01-19 PROCEDURE — 87338 HPYLORI STOOL AG IA: CPT

## 2024-01-22 ENCOUNTER — TELEPHONE (OUTPATIENT)
Dept: FAMILY MEDICINE | Facility: CLINIC | Age: 59
End: 2024-01-22
Payer: MEDICARE

## 2024-01-22 LAB — H PYLORI AG STL QL IA: NEGATIVE

## 2024-01-22 NOTE — TELEPHONE ENCOUNTER
----- Message from Alea Kendrick MD sent at 1/22/2024 12:46 PM CST -----  Test for H. pylori infection was negative.

## 2024-01-30 ENCOUNTER — TELEPHONE (OUTPATIENT)
Dept: FAMILY MEDICINE | Facility: CLINIC | Age: 59
End: 2024-01-30
Payer: MEDICARE

## 2024-01-30 DIAGNOSIS — R06.2 WHEEZING: Primary | ICD-10-CM

## 2024-01-30 RX ORDER — FLUTICASONE PROPIONATE 44 UG/1
1 AEROSOL, METERED RESPIRATORY (INHALATION) EVERY 4 HOURS PRN
Qty: 10.6 G | Refills: 1 | Status: SHIPPED | OUTPATIENT
Start: 2024-01-30

## 2024-01-30 RX ORDER — ALBUTEROL SULFATE 90 UG/1
2 AEROSOL, METERED RESPIRATORY (INHALATION) EVERY 6 HOURS PRN
Qty: 18 G | Refills: 3 | Status: SHIPPED | OUTPATIENT
Start: 2024-01-30

## 2024-01-30 NOTE — TELEPHONE ENCOUNTER
Received incoming fax from pharmacy regarding medication  budesonide-formoterol (SYMBICORT) 80-4.5 MCG/ACT Inhaler stated that medication is not on there formulary. Alternatives that are covered are ADVAIR, HFA, BREO,  ELLIPTA, DULERA. Please send alternative and call pharmacy if you have any questions.  Thanks!

## 2024-01-30 NOTE — TELEPHONE ENCOUNTER
Please call to inform patient that Symbicort was not covered by patient's insurance.  I am going to send albuterol along with Flovent.  She should take this Flovent every time that she takes the albuterol as needed.

## 2024-02-16 ENCOUNTER — TELEPHONE (OUTPATIENT)
Dept: FAMILY MEDICINE | Facility: CLINIC | Age: 59
End: 2024-02-16

## 2024-02-16 NOTE — TELEPHONE ENCOUNTER
Pt missed appt today with Dr. Kendrick for follow up on abd pain.   Called and lvm. If pt calls back please assist scheduling.

## 2024-03-10 NOTE — TELEPHONE ENCOUNTER
Spoke to daughter (Trini, CTC on file) and relayed PCP message. Trini verbalized understanding.     No other questions or concerns.    Padmini REYNA RN  [facility]        Alea Kendrick MD Physician Signed3:39 PM     Please call to inform patient that Symbicort was not covered by patient's insurance.  I am going to send albuterol along with Flovent.  She should take this Flovent every time that she takes the albuterol as needed.         Home

## 2024-04-26 ENCOUNTER — OFFICE VISIT (OUTPATIENT)
Dept: FAMILY MEDICINE | Facility: CLINIC | Age: 59
End: 2024-04-26
Payer: MEDICARE

## 2024-04-26 ENCOUNTER — TELEPHONE (OUTPATIENT)
Dept: FAMILY MEDICINE | Facility: CLINIC | Age: 59
End: 2024-04-26

## 2024-04-26 VITALS
DIASTOLIC BLOOD PRESSURE: 88 MMHG | HEART RATE: 76 BPM | HEIGHT: 59 IN | TEMPERATURE: 97.7 F | RESPIRATION RATE: 21 BRPM | OXYGEN SATURATION: 97 % | WEIGHT: 180.3 LBS | SYSTOLIC BLOOD PRESSURE: 136 MMHG | BODY MASS INDEX: 36.35 KG/M2

## 2024-04-26 DIAGNOSIS — H53.8 BLURRED VISION: ICD-10-CM

## 2024-04-26 DIAGNOSIS — R22.0 FACIAL SWELLING: Primary | ICD-10-CM

## 2024-04-26 DIAGNOSIS — E04.9 NONTOXIC GOITER, UNSPECIFIED: ICD-10-CM

## 2024-04-26 DIAGNOSIS — R22.1 NECK SWELLING: ICD-10-CM

## 2024-04-26 LAB
ANION GAP SERPL CALCULATED.3IONS-SCNC: 10 MMOL/L (ref 7–15)
BUN SERPL-MCNC: 14.9 MG/DL (ref 8–23)
CALCIUM SERPL-MCNC: 9.8 MG/DL (ref 8.6–10)
CHLORIDE SERPL-SCNC: 104 MMOL/L (ref 98–107)
CREAT SERPL-MCNC: 0.75 MG/DL (ref 0.51–0.95)
DEPRECATED HCO3 PLAS-SCNC: 26 MMOL/L (ref 22–29)
EGFRCR SERPLBLD CKD-EPI 2021: >90 ML/MIN/1.73M2
GLUCOSE SERPL-MCNC: 184 MG/DL (ref 70–99)
POTASSIUM SERPL-SCNC: 4.4 MMOL/L (ref 3.4–5.3)
SODIUM SERPL-SCNC: 140 MMOL/L (ref 135–145)
T4 FREE SERPL-MCNC: 0.75 NG/DL (ref 0.9–1.7)
TSH SERPL DL<=0.005 MIU/L-ACNC: 4.74 UIU/ML (ref 0.3–4.2)

## 2024-04-26 PROCEDURE — 99214 OFFICE O/P EST MOD 30 MIN: CPT | Performed by: STUDENT IN AN ORGANIZED HEALTH CARE EDUCATION/TRAINING PROGRAM

## 2024-04-26 PROCEDURE — 84443 ASSAY THYROID STIM HORMONE: CPT | Performed by: STUDENT IN AN ORGANIZED HEALTH CARE EDUCATION/TRAINING PROGRAM

## 2024-04-26 PROCEDURE — 80048 BASIC METABOLIC PNL TOTAL CA: CPT | Performed by: STUDENT IN AN ORGANIZED HEALTH CARE EDUCATION/TRAINING PROGRAM

## 2024-04-26 PROCEDURE — 84439 ASSAY OF FREE THYROXINE: CPT | Performed by: STUDENT IN AN ORGANIZED HEALTH CARE EDUCATION/TRAINING PROGRAM

## 2024-04-26 PROCEDURE — 36415 COLL VENOUS BLD VENIPUNCTURE: CPT | Performed by: STUDENT IN AN ORGANIZED HEALTH CARE EDUCATION/TRAINING PROGRAM

## 2024-04-26 NOTE — PROGRESS NOTES
Assessment & Plan     Facial swelling  Neck swelling/Nontoxic goiter, unspecified  Blurred vision  Ruiz is a 58 year old who presents to clinic with 1-2 months of facial swelling. There is not significant indication of an allergic cause as she is not exhibiting other symptoms of allergy such as rash, tongue swelling, breathing changes. She also does not report changes in her skin care regimen or products. Recommended that she consider diphenhydramine nightly for 5 days to see if there is improvement in her symptoms for potential allergen contribution.  Blood pressure is within normal limits, no noted edema elsewhere per patient or on physical examination. She does have some anterior neck swelling and a mildly enlarged thyroid so will plan to check for thyroid dysregulation as this can also contribute to blurred vision depending on the mechanism. Will also plan to monitor for kidney or electrolyte abnormalities via BMP and follow up as needed.   - Basic metabolic panel  (Ca, Cl, CO2, Creat, Gluc, K, Na, BUN)  - TSH with free T4 reflex     26 minutes spent by me on the date of the encounter doing chart review, history and exam, documentation and further activities per the note. Billed based on complexity of care.     No follow-ups on file.    Ashlyn Maynard MD  [facility]    Subjective     Ruiz is a 58 year old female who presents to clinic today for the following health issues:  Chief Complaint   Patient presents with    Facial Swelling     Pt. Complain of swelling for the last 2 month. Pt. Denied of having this symptoms before.  Also complain of blurry and watery vision for the last 1-2 month.      HPI    Started having swelling in her face. Started with puffiness and thought that it might have been allergies and would go away. Has been going on for about 2 months. All around the face but mainly the cheeks and around the forehead. Denies drainage or rashes. Recent cold symptoms but  "otherwise no other illness. She denies using any new products such as lotion, face wash, soaps, make up. Has noticed that she was getting blurry vision in both eyes about 1 month ago and it has persisted and is constant. Really hard for her to see at night. She does wear glasses for reading. Denies throat or tongue swelling, swelling in other parts of her body, shortness of breath. No medication changes. Has been feeling bloated especially in the morning. Abdominal discomfort feels like a pulled muscle around the ribcage and abdomen.     Review of Systems  Constitutional, HEENT, cardiovascular, pulmonary, gi and gu systems are negative, except as otherwise noted.    Hmong interpretation provided by a family member per patient's preference.       Objective    /88 (BP Location: Right arm, Patient Position: Sitting, Cuff Size: Adult Regular)   Pulse 76   Temp 97.7  F (36.5  C) (Oral)   Resp 21   Ht 1.486 m (4' 10.5\")   Wt 81.8 kg (180 lb 4.8 oz)   LMP  (LMP Unknown)   SpO2 97%   BMI 37.04 kg/m    Physical Exam   GENERAL: alert and no distress  EYES: Eyes grossly normal to inspection, PERRL and conjunctivae and sclerae normal  HENT: normal cephalic/atraumatic and facial non pitting edema most prominent over the cheeks  NECK: no adenopathy, no asymmetry, masses, or scars, and thyromegaly approximately 2 times normal  RESP: lungs clear to auscultation - no rales, rhonchi or wheezes  CV: regular rate and rhythm, normal S1 S2, no S3 or S4, no murmur, click or rub, no peripheral edema  ABDOMEN: soft, nontender, no hepatosplenomegaly, no masses and bowel sounds normal  MS: no gross musculoskeletal defects noted, no edema  SKIN: no suspicious lesions or rashes  NEURO: Normal strength and tone, mentation intact and speech normal    No results found for this or any previous visit (from the past 24 hour(s)).      "

## 2024-04-27 ENCOUNTER — TELEPHONE (OUTPATIENT)
Dept: FAMILY MEDICINE | Facility: CLINIC | Age: 59
End: 2024-04-27
Payer: MEDICARE

## 2024-04-27 NOTE — TELEPHONE ENCOUNTER
Called patient to discuss test results. There was not an  available. She wanted me to try to call her daughter to relay the information but she did not . Will have to try again with available  tomorrow. Test results showed a mild hypothyroidism at this time with elevated TSH and low free T4.     Ashlyn Maynard MD

## 2024-04-27 NOTE — TELEPHONE ENCOUNTER
Called the patient to discuss her elevated TSH and low T4 with a [facility] . Recommended that she follow up with her primary provider to discuss next steps in regards to her thyroid. She expressed understanding of the results.     Ashlyn Maynard MD

## 2024-07-16 DIAGNOSIS — E78.00 PURE HYPERCHOLESTEROLEMIA: ICD-10-CM

## 2024-07-16 RX ORDER — ATORVASTATIN CALCIUM 10 MG/1
10 TABLET, FILM COATED ORAL DAILY
Qty: 90 TABLET | Refills: 3 | OUTPATIENT
Start: 2024-07-16

## 2024-10-16 DIAGNOSIS — E78.00 PURE HYPERCHOLESTEROLEMIA: ICD-10-CM

## 2024-10-16 RX ORDER — ATORVASTATIN CALCIUM 10 MG/1
10 TABLET, FILM COATED ORAL DAILY
Qty: 90 TABLET | Refills: 3 | OUTPATIENT
Start: 2024-10-16

## 2024-10-17 RX ORDER — ATORVASTATIN CALCIUM 10 MG/1
10 TABLET, FILM COATED ORAL DAILY
Qty: 90 TABLET | Refills: 0 | Status: SHIPPED | OUTPATIENT
Start: 2024-10-17

## 2024-11-15 ENCOUNTER — TELEPHONE (OUTPATIENT)
Dept: FAMILY MEDICINE | Facility: CLINIC | Age: 59
End: 2024-11-15
Payer: MEDICARE

## 2024-11-15 ENCOUNTER — MEDICAL CORRESPONDENCE (OUTPATIENT)
Dept: HEALTH INFORMATION MANAGEMENT | Facility: CLINIC | Age: 59
End: 2024-11-15
Payer: MEDICARE

## 2024-11-15 NOTE — TELEPHONE ENCOUNTER
Forms/Letter Request    Type of form/letter: OTHER: Incontinence supply order prescription        Do we have the form/letter: Yes: placed in PCP inbox    Who is the form from? [employer] [facility]  (if other please explain)    Where did/will the form come from? form was faxed in    When is form/letter needed by: asap    How would you like the form/letter returned: Fax : [phone]

## 2024-11-20 ENCOUNTER — TRANSFERRED RECORDS (OUTPATIENT)
Dept: HEALTH INFORMATION MANAGEMENT | Facility: CLINIC | Age: 59
End: 2024-11-20
Payer: MEDICARE

## 2025-01-13 ENCOUNTER — OFFICE VISIT (OUTPATIENT)
Dept: URGENT CARE | Facility: URGENT CARE | Age: 60
End: 2025-01-13
Payer: MEDICARE

## 2025-01-13 VITALS
RESPIRATION RATE: 18 BRPM | SYSTOLIC BLOOD PRESSURE: 120 MMHG | TEMPERATURE: 98.3 F | DIASTOLIC BLOOD PRESSURE: 73 MMHG | OXYGEN SATURATION: 97 % | HEART RATE: 84 BPM

## 2025-01-13 DIAGNOSIS — J45.31 MILD PERSISTENT ASTHMA WITH ACUTE EXACERBATION: Primary | ICD-10-CM

## 2025-01-13 DIAGNOSIS — E11.65 TYPE 2 DIABETES MELLITUS WITH HYPERGLYCEMIA, WITHOUT LONG-TERM CURRENT USE OF INSULIN (H): ICD-10-CM

## 2025-01-13 PROCEDURE — 99214 OFFICE O/P EST MOD 30 MIN: CPT | Performed by: PHYSICIAN ASSISTANT

## 2025-01-13 RX ORDER — PREDNISONE 20 MG/1
40 TABLET ORAL DAILY
Qty: 10 TABLET | Refills: 0 | Status: SHIPPED | OUTPATIENT
Start: 2025-01-13 | End: 2025-01-18

## 2025-01-13 RX ORDER — ALBUTEROL SULFATE 90 UG/1
2 INHALANT RESPIRATORY (INHALATION) EVERY 4 HOURS PRN
Qty: 18 G | Refills: 11 | Status: SHIPPED | OUTPATIENT
Start: 2025-01-13

## 2025-01-13 RX ORDER — ALBUTEROL SULFATE 90 UG/1
2 INHALANT RESPIRATORY (INHALATION) EVERY 4 HOURS PRN
Qty: 18 G | Refills: 2 | Status: SHIPPED | OUTPATIENT
Start: 2025-01-13

## 2025-01-13 NOTE — PROGRESS NOTES
Assessment & Plan     Mild persistent asthma with acute exacerbation  Refilled albuterol.  ICS as ordered.  Short course of ora prednsione.  Albuterol MDI and nebules for home use.    She recently moved and no longer has her nebulizer machine, will get her a new one.  At the end of the encounter, I discussed results, diagnosis, medications. Discussed red flags for immediate return to clinic/ER, as well as indications for follow up if no improvement. Patient understood and agreed to plan. Patient was stable for discharge    Diabetes type 2 without use of insulin:  caution re: prednisone will likely cause elevation of BS, will need to monitor closely and adjust activity, diet as needed, also continue medications as ordered.        - albuterol (PROAIR HFA/PROVENTIL HFA/VENTOLIN HFA) 108 (90 Base) MCG/ACT inhaler  Dispense: 18 g; Refill: 11  - beclomethasone HFA (QVAR REDIHALER) 80 MCG/ACT inhaler  Dispense: 10.6 g; Refill: 11  - predniSONE (DELTASONE) 20 MG tablet  Dispense: 10 tablet; Refill: 0  - Nebulizer and Supplies Order for DME - ONLY FOR DME  - albuterol (PROAIR HFA/PROVENTIL HFA/VENTOLIN HFA) 108 (90 Base) MCG/ACT inhaler  Dispense: 18 g; Refill: 2           Sonam Jeffrey PA-C  [facility] [place]    Delvin Melchor is a 59 year old female who presents to clinic today for the following health issues:  Chief Complaint   Patient presents with    Cold Symptoms     Sx started 1 week. Cough. Wheezing. Some headache form cough.         1/13/2025     1:28 PM   Additional Questions   Roomed by Jae Berry MA   Accompanied by daughter     HPI  Pt was seen with the assistance of a professional interpretor during the history taking, physical exam and subsequent discussion in order to facilitate communication and culturally-sensitive care.  Pt accompanied by daughter.      1 week hx of cough, wheezing, sob.  Hx of asthma. Usually fairly well controlled.    Has used inhalers in the past for  asthma, has been out of the inhalers including SRINIVAS and ICS.  She has also had a LABA/ICS combination but that was in the past.  Uses intermittently with uri sx.  Has been off flovent for about 1 year. Primarily bothers with uri. Not sure she has allergies but moved recently. Does not use any allergy medications at baseline.     No rhionrrhea, congestion.    No fevers.    No chest pain.    Cough can cause HA.    No V/D. No others at home ill    BS well controlled.      Review of Systems  Constitutional, HEENT, cardiovascular, pulmonary, gi and gu systems are negative, except as otherwise noted.    Patient Active Problem List   Diagnosis    Morbid obesity (H)    Moderate episode of recurrent major depressive disorder (H)    History of peptic ulcer    Type 2 diabetes mellitus with hyperglycemia, without long-term current use of insulin (H)    Chronic pain syndrome    Urinary incontinence    Wheezing     Current Outpatient Medications   Medication Sig Dispense Refill    acetaminophen (TYLENOL) 500 MG tablet Take 2 tablets (1,000 mg) by mouth every 6 hours as needed for pain 50 tablet 0    albuterol (PROAIR HFA/PROVENTIL HFA/VENTOLIN HFA) 108 (90 Base) MCG/ACT inhaler Inhale 2 puffs into the lungs every 4 hours as needed for shortness of breath, wheezing or cough. 18 g 11    albuterol (PROAIR HFA/PROVENTIL HFA/VENTOLIN HFA) 108 (90 Base) MCG/ACT inhaler Inhale 2 puffs into the lungs every 4 hours as needed for shortness of breath, wheezing or cough. 18 g 2    atorvastatin (LIPITOR) 10 MG tablet Take 1 tablet (10 mg) by mouth daily. 90 tablet 0    beclomethasone HFA (QVAR REDIHALER) 80 MCG/ACT inhaler Inhale 1 puff into the lungs 2 times daily. 10.6 g 11    benzonatate (TESSALON) 100 MG capsule Take 1 capsule (100 mg) by mouth 3 times daily as needed for cough 30 capsule 0    blood glucose (CONTOUR NEXT TEST) test strip Test blood sugar once daily: in the morning before eating or two hours after one of your meals. 50  strip 11    calcium carbonate-vitamin D (OSCAL) 500-5 MG-MCG tablet Take 1 tablet by mouth 2 times daily 90 tablet 3    canagliflozin (INVOKANA) 100 MG tablet Take 1 tablet (100 mg) by mouth every morning (before breakfast) 30 tablet 2    DULoxetine (CYMBALTA) 60 MG capsule Take 1 capsule (60 mg) by mouth daily 90 capsule 3    guaiFENesin (MUCINEX) 600 MG 12 hr tablet Take 2 tablets (1,200 mg) by mouth 2 times daily 30 tablet 0    ibuprofen (ADVIL/MOTRIN) 600 MG tablet Take 1 tablet (600 mg) by mouth every 6 hours as needed for moderate pain 30 tablet 0    ibuprofen (ADVIL/MOTRIN) 600 MG tablet Take 1 tablet (600 mg) by mouth every 8 hours as needed for moderate pain 21 tablet 0    metFORMIN (GLUCOPHAGE XR) 500 MG 24 hr tablet TAKE 2 TABLETS(1000 MG) BY MOUTH TWICE DAILY WITH MEALS 360 tablet 3    Microlet Lancets MISC USE TO TEST BLOOD SUGAR ONCE DAILY 100 each 3    polyethylene glycol (MIRALAX) 17 GM/Dose powder Take 17 g (1 Capful) by mouth daily 289 g 1    predniSONE (DELTASONE) 20 MG tablet Take 2 tablets (40 mg) by mouth daily for 5 days. 10 tablet 0    albuterol (PROAIR HFA, PROVENTIL HFA, VENTOLIN HFA) 108 (90 BASE) MCG/ACT inhaler Inhale 2 puffs into the lungs every 6 hours as needed for shortness of breath / dyspnea or wheezing (Patient not taking: Reported on 1/13/2025) 3 Inhaler 1    albuterol (PROAIR HFA/PROVENTIL HFA/VENTOLIN HFA) 108 (90 Base) MCG/ACT inhaler Inhale 2 puffs into the lungs every 6 hours as needed for shortness of breath, wheezing or cough (Patient not taking: Reported on 1/13/2025) 18 g 3     No current facility-administered medications for this visit.         Patient Active Problem List   Diagnosis    Morbid obesity (H)    Moderate episode of recurrent major depressive disorder (H)    History of peptic ulcer    Type 2 diabetes mellitus with hyperglycemia, without long-term current use of insulin (H)    Chronic pain syndrome    Urinary incontinence    Wheezing     Current Outpatient  Medications   Medication Sig Dispense Refill    acetaminophen (TYLENOL) 500 MG tablet Take 2 tablets (1,000 mg) by mouth every 6 hours as needed for pain 50 tablet 0    albuterol (PROAIR HFA/PROVENTIL HFA/VENTOLIN HFA) 108 (90 Base) MCG/ACT inhaler Inhale 2 puffs into the lungs every 4 hours as needed for shortness of breath, wheezing or cough. 18 g 11    albuterol (PROAIR HFA/PROVENTIL HFA/VENTOLIN HFA) 108 (90 Base) MCG/ACT inhaler Inhale 2 puffs into the lungs every 4 hours as needed for shortness of breath, wheezing or cough. 18 g 2    atorvastatin (LIPITOR) 10 MG tablet Take 1 tablet (10 mg) by mouth daily. 90 tablet 0    beclomethasone HFA (QVAR REDIHALER) 80 MCG/ACT inhaler Inhale 1 puff into the lungs 2 times daily. 10.6 g 11    benzonatate (TESSALON) 100 MG capsule Take 1 capsule (100 mg) by mouth 3 times daily as needed for cough 30 capsule 0    blood glucose (CONTOUR NEXT TEST) test strip Test blood sugar once daily: in the morning before eating or two hours after one of your meals. 50 strip 11    calcium carbonate-vitamin D (OSCAL) 500-5 MG-MCG tablet Take 1 tablet by mouth 2 times daily 90 tablet 3    canagliflozin (INVOKANA) 100 MG tablet Take 1 tablet (100 mg) by mouth every morning (before breakfast) 30 tablet 2    DULoxetine (CYMBALTA) 60 MG capsule Take 1 capsule (60 mg) by mouth daily 90 capsule 3    guaiFENesin (MUCINEX) 600 MG 12 hr tablet Take 2 tablets (1,200 mg) by mouth 2 times daily 30 tablet 0    ibuprofen (ADVIL/MOTRIN) 600 MG tablet Take 1 tablet (600 mg) by mouth every 6 hours as needed for moderate pain 30 tablet 0    ibuprofen (ADVIL/MOTRIN) 600 MG tablet Take 1 tablet (600 mg) by mouth every 8 hours as needed for moderate pain 21 tablet 0    metFORMIN (GLUCOPHAGE XR) 500 MG 24 hr tablet TAKE 2 TABLETS(1000 MG) BY MOUTH TWICE DAILY WITH MEALS 360 tablet 3    Microlet Lancets MISC USE TO TEST BLOOD SUGAR ONCE DAILY 100 each 3    polyethylene glycol (MIRALAX) 17 GM/Dose powder Take 17 g  (1 Capful) by mouth daily 289 g 1    predniSONE (DELTASONE) 20 MG tablet Take 2 tablets (40 mg) by mouth daily for 5 days. 10 tablet 0    albuterol (PROAIR HFA, PROVENTIL HFA, VENTOLIN HFA) 108 (90 BASE) MCG/ACT inhaler Inhale 2 puffs into the lungs every 6 hours as needed for shortness of breath / dyspnea or wheezing (Patient not taking: Reported on 1/13/2025) 3 Inhaler 1    albuterol (PROAIR HFA/PROVENTIL HFA/VENTOLIN HFA) 108 (90 Base) MCG/ACT inhaler Inhale 2 puffs into the lungs every 6 hours as needed for shortness of breath, wheezing or cough (Patient not taking: Reported on 1/13/2025) 18 g 3     No current facility-administered medications for this visit.           Objective    /73   Pulse 84   Temp 98.3  F (36.8  C) (Oral)   Resp 18   LMP  (LMP Unknown)   SpO2 97%       Physical Exam   Pt is in no acute distress and appears well  Ears patent B:  TM s intact, non-injected. All land marks easily visibile    Nasal mucosa is non-edematous, no discharge.    Pharynx: non erythematous, tonsils non hypertrophied, No exudate   Neck supple: no adenopathy  Lungs: expiratory wheezing throughout B, able to talk in full sentences, no retractions, no accessory muscle use.   Heart: RRR, no murmur, no thrills or heaves   Ext: no edema  Skin: no rashes    No results found for any visits on 01/13/25.

## 2025-06-20 ENCOUNTER — TELEPHONE (OUTPATIENT)
Dept: FAMILY MEDICINE | Facility: CLINIC | Age: 60
End: 2025-06-20

## 2025-06-24 NOTE — TELEPHONE ENCOUNTER
Future Appointments 6/24/2025 - 12/21/2025        Date Visit Type Length Department Provider     8/4/2025 11:45 AM ANNUAL WELLNESS 30 min [facility] Alea Kendrick MD     8/4/2025 11:45 AM ANNUAL WELLNESS 30 min UR  SERVICE VIRTUAL     Location Instructions:     [facility] is located at [address] [place] [address] in [place],at the intersection of [facility].This is one block south of the [facility].Free parking is available in the lot directly north of the clinic across [facility].The clinic is near stops along bus [address].  Due to ongoing road construction in the area around the [facility], travel times to this location may be longer than usual.Please plan for extra travel time and check the [place] Department of Transportation website for delay, closure,and detour information on the various road construction projects in the area.

## 2025-08-24 DIAGNOSIS — E78.00 PURE HYPERCHOLESTEROLEMIA: ICD-10-CM

## 2025-08-25 ENCOUNTER — OFFICE VISIT (OUTPATIENT)
Dept: FAMILY MEDICINE | Facility: CLINIC | Age: 60
End: 2025-08-25
Payer: MEDICARE

## 2025-08-25 VITALS
HEIGHT: 58 IN | BODY MASS INDEX: 38.2 KG/M2 | HEART RATE: 80 BPM | WEIGHT: 182 LBS | SYSTOLIC BLOOD PRESSURE: 112 MMHG | DIASTOLIC BLOOD PRESSURE: 74 MMHG | TEMPERATURE: 97.6 F | RESPIRATION RATE: 21 BRPM | OXYGEN SATURATION: 98 %

## 2025-08-25 DIAGNOSIS — R79.89 ABNORMAL TSH: ICD-10-CM

## 2025-08-25 DIAGNOSIS — Z23 NEED FOR VACCINATION: ICD-10-CM

## 2025-08-25 DIAGNOSIS — E11.65 TYPE 2 DIABETES MELLITUS WITH HYPERGLYCEMIA, WITHOUT LONG-TERM CURRENT USE OF INSULIN (H): ICD-10-CM

## 2025-08-25 DIAGNOSIS — R94.6 ABNORMAL RESULTS OF THYROID FUNCTION STUDIES: ICD-10-CM

## 2025-08-25 DIAGNOSIS — M85.88 OSTEOPENIA OF LUMBAR SPINE: ICD-10-CM

## 2025-08-25 DIAGNOSIS — K59.00 CONSTIPATION, UNSPECIFIED CONSTIPATION TYPE: ICD-10-CM

## 2025-08-25 DIAGNOSIS — G89.4 CHRONIC PAIN SYNDROME: ICD-10-CM

## 2025-08-25 DIAGNOSIS — Z13.6 SCREENING FOR CARDIOVASCULAR CONDITION: ICD-10-CM

## 2025-08-25 DIAGNOSIS — E78.00 PURE HYPERCHOLESTEROLEMIA: ICD-10-CM

## 2025-08-25 DIAGNOSIS — M25.561 BILATERAL CHRONIC KNEE PAIN: ICD-10-CM

## 2025-08-25 DIAGNOSIS — E66.812 CLASS 2 SEVERE OBESITY WITH SERIOUS COMORBIDITY AND BODY MASS INDEX (BMI) OF 37.0 TO 37.9 IN ADULT, UNSPECIFIED OBESITY TYPE (H): ICD-10-CM

## 2025-08-25 DIAGNOSIS — G89.29 BILATERAL CHRONIC KNEE PAIN: ICD-10-CM

## 2025-08-25 DIAGNOSIS — Z00.00 ENCOUNTER FOR MEDICARE ANNUAL WELLNESS EXAM: Primary | ICD-10-CM

## 2025-08-25 DIAGNOSIS — Z74.09 IMPAIRED MOBILITY: ICD-10-CM

## 2025-08-25 DIAGNOSIS — M79.10 MYALGIA: ICD-10-CM

## 2025-08-25 DIAGNOSIS — R06.2 WHEEZING: ICD-10-CM

## 2025-08-25 DIAGNOSIS — M25.562 BILATERAL CHRONIC KNEE PAIN: ICD-10-CM

## 2025-08-25 DIAGNOSIS — E66.01 CLASS 2 SEVERE OBESITY WITH SERIOUS COMORBIDITY AND BODY MASS INDEX (BMI) OF 37.0 TO 37.9 IN ADULT, UNSPECIFIED OBESITY TYPE (H): ICD-10-CM

## 2025-08-25 DIAGNOSIS — F33.1 MODERATE EPISODE OF RECURRENT MAJOR DEPRESSIVE DISORDER (H): ICD-10-CM

## 2025-08-25 LAB
ALBUMIN SERPL BCG-MCNC: 4.2 G/DL (ref 3.5–5.2)
ALP SERPL-CCNC: 109 U/L (ref 40–150)
ALT SERPL W P-5'-P-CCNC: 32 U/L (ref 0–50)
ANION GAP SERPL CALCULATED.3IONS-SCNC: 11 MMOL/L (ref 7–15)
AST SERPL W P-5'-P-CCNC: 29 U/L (ref 0–45)
BILIRUB SERPL-MCNC: 0.6 MG/DL
BUN SERPL-MCNC: 15.4 MG/DL (ref 8–23)
CALCIUM SERPL-MCNC: 9.7 MG/DL (ref 8.8–10.4)
CHLORIDE SERPL-SCNC: 100 MMOL/L (ref 98–107)
CHOLEST SERPL-MCNC: 178 MG/DL
CREAT SERPL-MCNC: 0.66 MG/DL (ref 0.51–0.95)
CREAT UR-MCNC: 62.1 MG/DL
EGFRCR SERPLBLD CKD-EPI 2021: >90 ML/MIN/1.73M2
EST. AVERAGE GLUCOSE BLD GHB EST-MCNC: 194 MG/DL
FASTING STATUS PATIENT QL REPORTED: NO
FASTING STATUS PATIENT QL REPORTED: NO
GLUCOSE SERPL-MCNC: 168 MG/DL (ref 70–99)
HBA1C MFR BLD: 8.4 % (ref 0–5.6)
HCO3 SERPL-SCNC: 28 MMOL/L (ref 22–29)
HDLC SERPL-MCNC: 63 MG/DL
LDLC SERPL CALC-MCNC: 93 MG/DL
MICROALBUMIN UR-MCNC: <12 MG/L
MICROALBUMIN/CREAT UR: NORMAL MG/G{CREAT}
NONHDLC SERPL-MCNC: 115 MG/DL
POTASSIUM SERPL-SCNC: 4 MMOL/L (ref 3.4–5.3)
PROT SERPL-MCNC: 7.2 G/DL (ref 6.4–8.3)
SODIUM SERPL-SCNC: 139 MMOL/L (ref 135–145)
T4 FREE SERPL-MCNC: 0.41 NG/DL (ref 0.9–1.7)
TRIGL SERPL-MCNC: 109 MG/DL
TSH SERPL DL<=0.005 MIU/L-ACNC: 4.53 UIU/ML (ref 0.3–4.2)

## 2025-08-25 RX ORDER — ATORVASTATIN CALCIUM 10 MG/1
10 TABLET, FILM COATED ORAL DAILY
Qty: 90 TABLET | Refills: 0 | Status: SHIPPED | OUTPATIENT
Start: 2025-08-25

## 2025-08-25 RX ORDER — ACETAMINOPHEN 500 MG
1000 TABLET ORAL EVERY 8 HOURS PRN
Qty: 90 TABLET | Refills: 3 | Status: SHIPPED | OUTPATIENT
Start: 2025-08-25

## 2025-08-25 RX ORDER — BUDESONIDE AND FORMOTEROL FUMARATE DIHYDRATE 160; 4.5 UG/1; UG/1
AEROSOL RESPIRATORY (INHALATION)
Qty: 20.4 G | Refills: 11 | Status: SHIPPED | OUTPATIENT
Start: 2025-08-25

## 2025-08-25 RX ORDER — POLYETHYLENE GLYCOL 3350 17 G/17G
1 POWDER, FOR SOLUTION ORAL DAILY PRN
Qty: 289 G | Refills: 1 | Status: SHIPPED | OUTPATIENT
Start: 2025-08-25

## 2025-08-25 SDOH — HEALTH STABILITY: PHYSICAL HEALTH: ON AVERAGE, HOW MANY MINUTES DO YOU ENGAGE IN EXERCISE AT THIS LEVEL?: 60 MIN

## 2025-08-25 SDOH — HEALTH STABILITY: PHYSICAL HEALTH: ON AVERAGE, HOW MANY DAYS PER WEEK DO YOU ENGAGE IN MODERATE TO STRENUOUS EXERCISE (LIKE A BRISK WALK)?: 3 DAYS

## 2025-08-25 ASSESSMENT — ASTHMA QUESTIONNAIRES
QUESTION_2 LAST FOUR WEEKS HOW OFTEN HAVE YOU HAD SHORTNESS OF BREATH: NOT AT ALL
QUESTION_1 LAST FOUR WEEKS HOW MUCH OF THE TIME DID YOUR ASTHMA KEEP YOU FROM GETTING AS MUCH DONE AT WORK, SCHOOL OR AT HOME: NONE OF THE TIME
QUESTION_5 LAST FOUR WEEKS HOW WOULD YOU RATE YOUR ASTHMA CONTROL: SOMEWHAT CONTROLLED
ACT_TOTALSCORE: 23
QUESTION_3 LAST FOUR WEEKS HOW OFTEN DID YOUR ASTHMA SYMPTOMS (WHEEZING, COUGHING, SHORTNESS OF BREATH, CHEST TIGHTNESS OR PAIN) WAKE YOU UP AT NIGHT OR EARLIER THAN USUAL IN THE MORNING: NOT AT ALL
QUESTION_4 LAST FOUR WEEKS HOW OFTEN HAVE YOU USED YOUR RESCUE INHALER OR NEBULIZER MEDICATION (SUCH AS ALBUTEROL): NOT AT ALL

## 2025-08-25 ASSESSMENT — PATIENT HEALTH QUESTIONNAIRE - PHQ9
SUM OF ALL RESPONSES TO PHQ QUESTIONS 1-9: 13
SUM OF ALL RESPONSES TO PHQ QUESTIONS 1-9: 13
10. IF YOU CHECKED OFF ANY PROBLEMS, HOW DIFFICULT HAVE THESE PROBLEMS MADE IT FOR YOU TO DO YOUR WORK, TAKE CARE OF THINGS AT HOME, OR GET ALONG WITH OTHER PEOPLE: VERY DIFFICULT

## 2025-08-26 ENCOUNTER — PATIENT OUTREACH (OUTPATIENT)
Dept: CARE COORDINATION | Facility: CLINIC | Age: 60
End: 2025-08-26
Payer: MEDICARE